# Patient Record
Sex: FEMALE | Race: WHITE | NOT HISPANIC OR LATINO | Employment: OTHER | URBAN - METROPOLITAN AREA
[De-identification: names, ages, dates, MRNs, and addresses within clinical notes are randomized per-mention and may not be internally consistent; named-entity substitution may affect disease eponyms.]

---

## 2017-01-10 ENCOUNTER — TREATMENT (OUTPATIENT)
Dept: PHYSICAL THERAPY | Facility: CLINIC | Age: 77
End: 2017-01-10

## 2017-01-10 ENCOUNTER — TELEPHONE (OUTPATIENT)
Dept: SPORTS MEDICINE | Facility: CLINIC | Age: 77
End: 2017-01-10

## 2017-01-10 DIAGNOSIS — M25.511 RIGHT SHOULDER PAIN, UNSPECIFIED CHRONICITY: Primary | ICD-10-CM

## 2017-01-10 PROCEDURE — DRYNDL PR CUSTOM DRY NEEDLING SELF PAY: Performed by: PHYSICAL THERAPIST

## 2017-01-10 PROCEDURE — 97161 PT EVAL LOW COMPLEX 20 MIN: CPT | Performed by: PHYSICAL THERAPIST

## 2017-01-10 PROCEDURE — 97140 MANUAL THERAPY 1/> REGIONS: CPT | Performed by: PHYSICAL THERAPIST

## 2017-01-10 NOTE — PROGRESS NOTES
"Physical Therapy Initial Evaluation      Patient Name: Jael Navarro       Patient MRN: [id number]  : 1940  Physician:Keaton Dao MD  Date: 1/10/2017    Encounter Diagnoses   Name Primary?   • Right shoulder pain, unspecified chronicity Yes       History: Pt describes 2-3 history of neck pain with \"trigger points\".  Acute exacerbation of right shoulder (upper trap) pain of 1-2 months duration, feels like it began when lifting her mother up in bed.   Also with generalized GH and upper arm pain.  Pt states she has \"tender points\" all over her body, including her quads and elbows.  Pain is worse with OH reach.  Pain ranges from 3/10 to 9/10.    Objective Testing: See flow sheet    THERAPY ASSESSMENT:  Jael Navarro is a pleasant 76 y.o. year old female who presents with signs and symptoms consistent with shoulder strain, most notably in upper trap and infraspinatus, likely exacerbated by postural dysfunction.  Pt also with some indication of rotator cuff involvement.  Subjective outcome measure indicates 45% perceived disability.  Patient is a  good candidate for skilled PT services in order to address impairments and facilitate return to normal daily activities including ADL's, work and recreational activities.        Functional Limitations: Lifting, Carrying, Complaints of Pain, Difficulty moving, Postural Dysfunction, Decreased ROM, Decreased Strength, Decreased ability to perform ADL's   Length of Therapy: 1 month   PT Frequency: PT 2x week   PT Interventions: Therapeutic exercise - AROM, Therapeutic exercise - stretching, Therapeutic exercise - strengthening, Traction, Manual Therapy, Bracing/Taping, Soft Tissue mobilization, Hot packs/ Moist heat, Cold packs, Electrical stimulation, Other (Comment), Ultrasound, Home Exercise Program (Dry needling)   Patient Agrees with Plan of Care: Yes    REHAB POTENTIAL: good            Short Term Goals: 2-4 weeks  Patient will:  1. Be independent " with initial HEP  2. Be instructed in posture and body mechanics  3. Report pain </= 3 with all daily activities    Long Term Goals: 4-6 weeks  Patient will:  1. Report pain of </= 1/10 for improved tolerance to daily activities.  2. Demonstrate improved Bilateral UE/scapular MMT of >/= 4+/5  3. Demonstrate normal scapulohumeral rhythm without evidence of scapular winging/tipping  4. Demonstrate no soft tissue restriction in involved musculature  5. QuickDASH </= 20%, corresponding with  CJ      Manual PT [id number] 10 minutes    Timed Code Treatment: 10   Minutes     Total Treatment Time: 60      Minutes    PT SIGNATURE: Jazzy Holcomb, PT   DATE TREATMENT INITIATED: 1/10/2017    Initial Certification Certification Period: 2/9/2017  I certify that the therapy services are furnished while this patient is under my care.  The services outlined above are required by this patient, and will be reviewed every 30 days.     PHYSICIAN: Keaton Dao MD      DATE:     Please sign and return via fax to [phone].. Thank you, [facility].

## 2017-01-11 DIAGNOSIS — Z00.00 PREVENTATIVE HEALTH CARE: Primary | ICD-10-CM

## 2017-01-18 ENCOUNTER — TREATMENT (OUTPATIENT)
Dept: PHYSICAL THERAPY | Facility: CLINIC | Age: 77
End: 2017-01-18

## 2017-01-18 DIAGNOSIS — M25.511 RIGHT SHOULDER PAIN, UNSPECIFIED CHRONICITY: Primary | ICD-10-CM

## 2017-01-18 PROCEDURE — 97140 MANUAL THERAPY 1/> REGIONS: CPT | Performed by: PHYSICAL THERAPIST

## 2017-01-18 PROCEDURE — 97110 THERAPEUTIC EXERCISES: CPT | Performed by: PHYSICAL THERAPIST

## 2017-01-18 PROCEDURE — DRYNDL PR CUSTOM DRY NEEDLING SELF PAY: Performed by: PHYSICAL THERAPIST

## 2017-01-18 NOTE — PROGRESS NOTES
Daily Progress Note      Subjective   Pt reports significant decreased pain after last visit.       Objective   See the Objective Evaluation flowsheet for any objective testing that may have been performed today.    PROCEDURES AND MODALITIES:  Paraffin:    Moist Heat:    Ice:    E-Stim:    Ultrasound:    Ionto:   Traction:      Other Procedure CPT TPDN minutes 20 minutes     Timed Code Treatment: - Minutes  Total Treatment Time: 20 Minutes    Assessment/Plan   After reviewing all risk of dry needling (including pneumothorax, bruising, infection, nerve injury, and soreness), verbal and written informed consent was obtained.  Manual palpation and assessment performed before, during and after dry needling session.  Clean needle technique observed at all times, precautions for lung fields, neurovascular structures observed. Pt tolerated treatment well with no adverse effects.   Progress per Plan of Care-assess pt response     Chanda Chapman, PT  Physical Therapist

## 2017-01-18 NOTE — PROGRESS NOTES
Daily Progress Note      Subjective   Pt reports decreased pain after last visit, was able to sleep well last night.  Would like to do dry needling again today.  Pain Scale (0-10): 3/10      Objective    Please see documentation completed by Chanda Chapman, PT, DPT for dry needling treatment performed today      PROCEDURES AND MODALITIES:  Paraffin:    Moist Heat: Rx Minutes: 10 mins  Ice:    E-Stim:    Ultrasound:    Ionto:   Traction:        Manual PT [id number] 15 minutes and Therapy Exercise 11074 8 minutes     Timed Code Treatment: 23 Minutes  Total Treatment Time: 50 Minutes    Assessment/Plan   Tolerated well. Requires significant cueing to ensure good for with exercises.  Progress per Plan of Care         Jazzy Holcomb, PT  Physical Therapist

## 2017-01-18 NOTE — PATIENT INSTRUCTIONS
Access Code: [email or web address]   URL: [email or web address]   Date: 01/18/2017   Prepared by: Jazzy Frausto   Seated Shoulder Rolls - 10 reps - 2 sets - 1x daily   Seated Scapular Retraction - 10 reps - 2 sets - 5 Hold - 1x daily

## 2017-01-19 ENCOUNTER — TELEPHONE (OUTPATIENT)
Dept: GASTROENTEROLOGY | Facility: CLINIC | Age: 77
End: 2017-01-19

## 2017-01-19 NOTE — TELEPHONE ENCOUNTER
Spoke with pt and informed her that after reviewing her OA paperwork, Dr Hidalgo requests to see her in the office prior to scheduling any scopes.  New patient appt scheduled for 2/9/17 at 1:15 pm with Dr Hidalgo.  Pt is going to call her PCP, Dr Dao to get a referral from [facility] for the office visit.

## 2017-01-20 ENCOUNTER — TREATMENT (OUTPATIENT)
Dept: PHYSICAL THERAPY | Facility: CLINIC | Age: 77
End: 2017-01-20

## 2017-01-20 DIAGNOSIS — M25.511 RIGHT SHOULDER PAIN, UNSPECIFIED CHRONICITY: Primary | ICD-10-CM

## 2017-01-20 PROCEDURE — 97035 APP MDLTY 1+ULTRASOUND EA 15: CPT | Performed by: PHYSICAL THERAPIST

## 2017-01-20 PROCEDURE — 97140 MANUAL THERAPY 1/> REGIONS: CPT | Performed by: PHYSICAL THERAPIST

## 2017-01-20 NOTE — PROGRESS NOTES
Daily Progress Note      Subjective   Pt states her upper trap is really hurting, doesn't feel like she can do the dry needling.  Does feel that her mobility is improving, but pain is unchanged.    Pain Scale (0-10): 5/10      Objective          PROCEDURES AND MODALITIES:  Paraffin:    Moist Heat: Rx Minutes: 12 mins  Ice:    E-Stim:    Ultrasound: Rx Minutes: 6/8  Ionto:   Traction:        Manual PT [id number] 15 minutes     Timed Code Treatment: 23 Minutes  Total Treatment Time: 38 Minutes    Assessment/Plan   Improved pain after Rx.    Progress per Plan of Care, increase postural work, exercise in clinic         Jazzy Holcomb, PT  Physical Therapist

## 2017-01-23 ENCOUNTER — TREATMENT (OUTPATIENT)
Dept: PHYSICAL THERAPY | Facility: CLINIC | Age: 77
End: 2017-01-23

## 2017-01-23 DIAGNOSIS — M25.511 RIGHT SHOULDER PAIN, UNSPECIFIED CHRONICITY: Primary | ICD-10-CM

## 2017-01-23 PROCEDURE — DRYNDL PR CUSTOM DRY NEEDLING SELF PAY: Performed by: PHYSICAL THERAPIST

## 2017-01-23 PROCEDURE — 97110 THERAPEUTIC EXERCISES: CPT | Performed by: PHYSICAL THERAPIST

## 2017-01-23 NOTE — PROGRESS NOTES
Physical Therapy Daily Progress Note      Jael Elana Melanie reports: decreased soreness in the right UT.  Reports improved mobility with TPDN.     Subjective    Objective     Palpation     Right Tenderness of the biceps, infraspinatus, teres major and upper trapezius.     See Exercise, Manual, and Modality Logs for complete treatment.     Assessment/Plan  After reviewing all risk of dry needling (including pneumothorax, bruising, infection, nerve injury, and soreness), written informed consent was obtained.  Manual palpation and assessment performed before, during and after dry needling session.  Clean needle technique observed at all times, precautions for lung fields, neurovascular structures observed. Pt tolerated treatment well with no adverse effects.     Progress per Plan of Care      Manual Therapy:  _     mins  43212;  Therapeutic Exercise:        mins  70823;     Neuromuscular Homa:      mins  49178;    Therapeutic Activity:          mins  99684;     Gait Training:           mins  65532;     Ultrasound:          mins  08039;    Electrical Stimulation:         mins  91177 ( );  Dry Needling      15    mins self-pay    Timed Treatment:   -   mins   Total Treatment:     15   mins    Chanda Chapman, PT  Physical Therapist

## 2017-01-23 NOTE — PROGRESS NOTES
Physical Therapy Daily Progress Note    Subjective     Jael Navarro reports: pain is better than last session, would like to try dry needling again.      Objective   See Exercise, Manual, and Modality Logs for complete treatment.   Please see documentation completed by Chanda Chapman, PT, DPT for dry needling treatment performed today    Assessment/Plan  Did well with exercises in clinic, pain/ease of movement improved.  Progress per Plan of Care with focus on postural exercise           Manual Therapy:    0     mins  71180;  Therapeutic Exercise:    30     mins  78976;     Neuromuscular Homa:    0    mins  11978;    Therapeutic Activity:     0     mins  75533;     Gait Trainin     mins  59228;     Ultrasound:     0     mins  39642;    Electrical Stimulation:    0     mins  59690 ( );    Timed Treatment:   30   mins   Total Treatment:     45   mins    Jazzy Holcomb PT  Physical Therapist  [place] License #[id number]

## 2017-01-26 ENCOUNTER — TREATMENT (OUTPATIENT)
Dept: PHYSICAL THERAPY | Facility: CLINIC | Age: 77
End: 2017-01-26

## 2017-01-26 DIAGNOSIS — M25.511 RIGHT SHOULDER PAIN, UNSPECIFIED CHRONICITY: Primary | ICD-10-CM

## 2017-01-26 PROCEDURE — 97110 THERAPEUTIC EXERCISES: CPT | Performed by: PHYSICAL THERAPIST

## 2017-01-26 PROCEDURE — 97140 MANUAL THERAPY 1/> REGIONS: CPT | Performed by: PHYSICAL THERAPIST

## 2017-01-26 NOTE — PROGRESS NOTES
Physical Therapy Daily Progress Note      Subjective     Jael Navarro reports: that her neck continues to be very sore but believes that her interscapular area is feeling better.  She reports that her SCM has been very sore.       Objective   See Exercise, Manual, and Modality Logs for complete treatment.       Assessment/Plan  Mrs. Navarro continues to have a lot of pain in her neck with severe muscle tightness.  Upon palpation her SCM bilaterally was very tight and instructed her on how to perform a SCM stretch.  She continues to need skilled PT to reduce her muscle tightness and improved ROM.     Progress per Plan of Care           Manual PT [id number] 15 minutes and Therapy Exercise 70968 25 minutes    Timed Treatment:   40   mins   Total Treatment:    50   mins    Anali Manzanares, PT  Physical Therapist  [place] License # [id number]

## 2017-01-27 ENCOUNTER — TELEPHONE (OUTPATIENT)
Dept: SPORTS MEDICINE | Facility: CLINIC | Age: 77
End: 2017-01-27

## 2017-01-27 DIAGNOSIS — Z00.00 PREVENTATIVE HEALTH CARE: ICD-10-CM

## 2017-02-03 ENCOUNTER — TREATMENT (OUTPATIENT)
Dept: PHYSICAL THERAPY | Facility: CLINIC | Age: 77
End: 2017-02-03

## 2017-02-03 DIAGNOSIS — M25.511 RIGHT SHOULDER PAIN, UNSPECIFIED CHRONICITY: Primary | ICD-10-CM

## 2017-02-03 PROCEDURE — 97110 THERAPEUTIC EXERCISES: CPT | Performed by: PHYSICAL THERAPIST

## 2017-02-03 PROCEDURE — DRYNDL PR CUSTOM DRY NEEDLING SELF PAY: Performed by: PHYSICAL THERAPIST

## 2017-02-03 NOTE — PROGRESS NOTES
Physical Therapy Daily Progress Note      Jael Elanamauricio Navarro reports: significant decreased pain since first visit.      Objective  See Exercise, Manual, and Modality Logs for complete treatment.     Assessment/Plan  After reviewing all risk of dry needling (including pneumothorax, bruising, infection, nerve injury, and soreness), written informed consent was obtained.  Manual palpation and assessment performed before, during and after dry needling session.  Clean needle technique observed at all times, precautions for lung fields, neurovascular structures observed. Pt tolerated treatment well with no adverse effects.     Progress per Plan of Care    Other Procedure CPT TPDN minutes 20    Timed Code Treatment: -   Minutes     Total Treatment Time: 20      Minutes    Chanda Chapman, PT, DPT  Physical Therapist #[id number]

## 2017-02-10 ENCOUNTER — TREATMENT (OUTPATIENT)
Dept: PHYSICAL THERAPY | Facility: CLINIC | Age: 77
End: 2017-02-10

## 2017-02-10 DIAGNOSIS — M25.511 RIGHT SHOULDER PAIN, UNSPECIFIED CHRONICITY: Primary | ICD-10-CM

## 2017-02-10 PROCEDURE — 97140 MANUAL THERAPY 1/> REGIONS: CPT | Performed by: PHYSICAL THERAPIST

## 2017-02-10 PROCEDURE — 97035 APP MDLTY 1+ULTRASOUND EA 15: CPT | Performed by: PHYSICAL THERAPIST

## 2017-02-10 NOTE — PROGRESS NOTES
Physical Therapy Daily Progress Note    Subjective     Jael Navarro reports: she cut her finger and has 10 stitches.  Her neck has been sore because of stress.      Objective   See Exercise, Manual, and Modality Logs for complete treatment.       Assessment/Plan  Held exercises today because of finger laceration.    Progress per Plan of Care resume exercises as able           Manual Therapy:    20     mins  73622;  Therapeutic Exercise:    0     mins  24239;     Neuromuscular Homa:    0    mins  81895;    Therapeutic Activity:     0     mins  13552;     Gait Trainin     mins  11685;     Ultrasound:     8     mins  63133;    Electrical Stimulation:    0     mins  86902 ( );    Timed Treatment:   28   mins   Total Treatment:     28   mins    Jazzy Holcomb PT, DPT  Physical Therapist  [place] License #[id number]

## 2017-02-17 ENCOUNTER — OFFICE VISIT (OUTPATIENT)
Dept: SPORTS MEDICINE | Facility: CLINIC | Age: 77
End: 2017-02-17

## 2017-02-17 ENCOUNTER — TREATMENT (OUTPATIENT)
Dept: PHYSICAL THERAPY | Facility: CLINIC | Age: 77
End: 2017-02-17

## 2017-02-17 VITALS
BODY MASS INDEX: 21.49 KG/M2 | HEART RATE: 70 BPM | DIASTOLIC BLOOD PRESSURE: 78 MMHG | HEIGHT: 65 IN | OXYGEN SATURATION: 98 % | SYSTOLIC BLOOD PRESSURE: 134 MMHG | WEIGHT: 129 LBS

## 2017-02-17 DIAGNOSIS — I10 ESSENTIAL HYPERTENSION: Primary | ICD-10-CM

## 2017-02-17 DIAGNOSIS — Z48.02 VISIT FOR SUTURE REMOVAL: ICD-10-CM

## 2017-02-17 DIAGNOSIS — M25.511 RIGHT SHOULDER PAIN, UNSPECIFIED CHRONICITY: Primary | ICD-10-CM

## 2017-02-17 PROCEDURE — 97035 APP MDLTY 1+ULTRASOUND EA 15: CPT | Performed by: PHYSICAL THERAPIST

## 2017-02-17 PROCEDURE — 97140 MANUAL THERAPY 1/> REGIONS: CPT | Performed by: PHYSICAL THERAPIST

## 2017-02-17 PROCEDURE — 99213 OFFICE O/P EST LOW 20 MIN: CPT | Performed by: FAMILY MEDICINE

## 2017-02-17 NOTE — PROGRESS NOTES
"Jael is a 76 y.o. year old female    Chief Complaint   Patient presents with   • Right Hand - Finger Injury, Pain   • Hypertension     Pt is here to f/u       History of Present Illness  1. Recently having elevated BP readings at home with automated cuff, up to 170s systolic. No CP or HA but has been under a lot of stress with her mother's declining health and likely upcoming death.   2. Cut her hand in the kitchen 2/6, had sutures placed, needs removed.     I have reviewed the patient's medical history in detail and updated the computerized patient record.    Review of Systems   Constitutional: Negative.    Respiratory: Negative.    Cardiovascular: Negative.    Neurological: Negative.        Visit Vitals   • /78   • Pulse 70   • Ht 65\" (165.1 cm)   • Wt 129 lb (58.5 kg)   • SpO2 98%   • BMI 21.47 kg/m2        Physical Exam   Constitutional: She is oriented to person, place, and time. She appears well-developed and well-nourished.   Neck: Normal range of motion.   Cardiovascular: Normal rate, regular rhythm and normal heart sounds.    Pulmonary/Chest: Effort normal and breath sounds normal.   Musculoskeletal:   R 5th finger with healed laceration overlying the MCP. Normal ROM and tendon function.    Neurological: She is alert and oriented to person, place, and time.   Skin: Skin is warm and dry.   Psychiatric: She has a normal mood and affect.   Vitals reviewed.    Sutures removed without difficulty. There was very mild tension at the periphery of the wound so a simple steri strip was applied with a bandage.     Diagnoses and all orders for this visit:    Essential hypertension    Visit for suture removal  Repeat manual BP checks by MA and MD are both relatively normal, 130s/70s. Check with home cuff read 160s/90s. Suspect inaccurate cuff; BP stable.  Wound healing well.  "

## 2017-02-17 NOTE — PROGRESS NOTES
Physical Therapy Daily Progress Note    Subjective     Jael Navarro reports: hasn't gotten her stitches out yet because her finger isn't fully healed.      Objective   See Exercise, Manual, and Modality Logs for complete treatment.       Assessment/Plan  Held exercise again today because of finger.  Will reassess next visit and resume exercises as able.  Progress per Plan of Care           Manual Therapy:    20     mins  84379;  Therapeutic Exercise:    0     mins  38914;     Neuromuscular Homa:    0    mins  47217;    Therapeutic Activity:     0     mins  20256;     Gait Trainin     mins  57129;     Ultrasound:     8     mins  54921;    Electrical Stimulation:    0     mins  45324 ( );    Timed Treatment:   28   mins   Total Treatment:     43   mins    Jazzy Holcomb PT, DPT  Physical Therapist  [place] License #[id number]

## 2017-02-23 ENCOUNTER — TREATMENT (OUTPATIENT)
Dept: PHYSICAL THERAPY | Facility: CLINIC | Age: 77
End: 2017-02-23

## 2017-02-23 ENCOUNTER — TRANSCRIBE ORDERS (OUTPATIENT)
Dept: ADMINISTRATIVE | Facility: HOSPITAL | Age: 77
End: 2017-02-23

## 2017-02-23 DIAGNOSIS — Z13.9 SCREENING: Primary | ICD-10-CM

## 2017-02-23 DIAGNOSIS — M25.511 RIGHT SHOULDER PAIN, UNSPECIFIED CHRONICITY: Primary | ICD-10-CM

## 2017-02-23 PROCEDURE — 97110 THERAPEUTIC EXERCISES: CPT | Performed by: PHYSICAL THERAPIST

## 2017-02-23 NOTE — PROGRESS NOTES
Physical Therapy Daily Progress Note    Subjective     Jael Navarro reports: shoulder is feeling pretty good, pain is reduced with exercises.      Objective   See Exercise, Manual, and Modality Logs for complete treatment.   Please see documentation completed by Chanda Chapman, PT, DPT for dry needling treatment performed today        Assessment/Plan  Pt is independent with HEP, minimal pain, QuickDASH improved by 10%.  Recommend hold at this time, if no acute exacerbation in 30 days, then D/C       Manual Therapy:    0     mins  58938;  Therapeutic Exercise:    40     mins  51243;     Neuromuscular Homa:    0    mins  93484;    Therapeutic Activity:     0     mins  17301;     Gait Trainin     mins  50452;     Ultrasound:     0     mins  69937;    Electrical Stimulation:    0     mins  56328 ( );    Timed Treatment:   40   mins   Total Treatment:     50   mins    Jazzy Holcomb PT, DPT  Physical Therapist  [place] License #[id number]

## 2017-02-24 NOTE — PATIENT INSTRUCTIONS
Access Code: [email or web address]   URL: [email or web address]   Date: 02/23/2017   Prepared by: Jazzy Holcomb     Exercises   Standing Shoulder Row with Anchored Resistance - 10 reps - 2 sets - 3x weekly   Shoulder Extension with Resistance - Palms Forward - 10 reps - 2 sets - 3x weekly   Sternocleidomastoid Stretch - 3 reps - 20 hold - 1x daily   Seated Upper Trapezius Stretch - 3 reps - 20 hold - 1x daily   Gentle Levator Scapulae Stretch - 3 reps - 20 hold - 1x daily   Supine Shoulder Alphabet - 26 reps - 2 sets - 3x weekly   Supine Bilateral Shoulder Protraction - 10 reps - 1 sets - 1x daily

## 2017-03-02 ENCOUNTER — OFFICE VISIT (OUTPATIENT)
Dept: GASTROENTEROLOGY | Facility: CLINIC | Age: 77
End: 2017-03-02

## 2017-03-02 ENCOUNTER — HOSPITAL ENCOUNTER (OUTPATIENT)
Dept: CARDIOLOGY | Facility: HOSPITAL | Age: 77
Discharge: HOME | End: 2017-03-02

## 2017-03-02 VITALS
BODY MASS INDEX: 21.83 KG/M2 | DIASTOLIC BLOOD PRESSURE: 75 MMHG | SYSTOLIC BLOOD PRESSURE: 173 MMHG | WEIGHT: 131 LBS | HEIGHT: 65 IN | HEART RATE: 70 BPM

## 2017-03-02 VITALS
DIASTOLIC BLOOD PRESSURE: 86 MMHG | BODY MASS INDEX: 22.36 KG/M2 | WEIGHT: 131 LBS | SYSTOLIC BLOOD PRESSURE: 152 MMHG | HEIGHT: 64 IN

## 2017-03-02 DIAGNOSIS — Z13.9 SCREENING: ICD-10-CM

## 2017-03-02 DIAGNOSIS — R10.32 LEFT LOWER QUADRANT PAIN: Primary | ICD-10-CM

## 2017-03-02 LAB
BH CV VAS BP LEFT ARM: NORMAL MMHG
BH CV VAS BP RIGHT ARM: NORMAL MMHG

## 2017-03-02 PROCEDURE — 99204 OFFICE O/P NEW MOD 45 MIN: CPT | Performed by: INTERNAL MEDICINE

## 2017-03-29 ENCOUNTER — ANESTHESIA (OUTPATIENT)
Dept: GASTROENTEROLOGY | Facility: HOSPITAL | Age: 77
End: 2017-03-29

## 2017-03-29 ENCOUNTER — ANESTHESIA EVENT (OUTPATIENT)
Dept: GASTROENTEROLOGY | Facility: HOSPITAL | Age: 77
End: 2017-03-29

## 2017-03-29 ENCOUNTER — HOSPITAL ENCOUNTER (OUTPATIENT)
Facility: HOSPITAL | Age: 77
Setting detail: HOSPITAL OUTPATIENT SURGERY
Discharge: HOME | End: 2017-03-29
Attending: INTERNAL MEDICINE | Admitting: INTERNAL MEDICINE

## 2017-03-29 VITALS
RESPIRATION RATE: 16 BRPM | HEART RATE: 66 BPM | WEIGHT: 132 LBS | TEMPERATURE: 97.4 F | SYSTOLIC BLOOD PRESSURE: 167 MMHG | DIASTOLIC BLOOD PRESSURE: 73 MMHG | HEIGHT: 65 IN | OXYGEN SATURATION: 98 % | BODY MASS INDEX: 21.99 KG/M2

## 2017-03-29 DIAGNOSIS — R10.32 LEFT LOWER QUADRANT PAIN: ICD-10-CM

## 2017-03-29 PROCEDURE — 25010000002 FENTANYL CITRATE (PF) 100 MCG/2ML SOLUTION: Performed by: ANESTHESIOLOGY

## 2017-03-29 PROCEDURE — 45380 COLONOSCOPY AND BIOPSY: CPT | Performed by: INTERNAL MEDICINE

## 2017-03-29 PROCEDURE — 88305 TISSUE EXAM BY PATHOLOGIST: CPT | Performed by: INTERNAL MEDICINE

## 2017-03-29 PROCEDURE — 25010000002 PROPOFOL 10 MG/ML EMULSION: Performed by: ANESTHESIOLOGY

## 2017-03-29 RX ORDER — FENTANYL CITRATE 50 UG/ML
INJECTION, SOLUTION INTRAMUSCULAR; INTRAVENOUS AS NEEDED
Status: DISCONTINUED | OUTPATIENT
Start: 2017-03-29 | End: 2017-03-29 | Stop reason: SURG

## 2017-03-29 RX ORDER — PROPOFOL 10 MG/ML
VIAL (ML) INTRAVENOUS AS NEEDED
Status: DISCONTINUED | OUTPATIENT
Start: 2017-03-29 | End: 2017-03-29 | Stop reason: SURG

## 2017-03-29 RX ORDER — SODIUM CHLORIDE, SODIUM LACTATE, POTASSIUM CHLORIDE, CALCIUM CHLORIDE 600; 310; 30; 20 MG/100ML; MG/100ML; MG/100ML; MG/100ML
30 INJECTION, SOLUTION INTRAVENOUS CONTINUOUS PRN
Status: DISCONTINUED | OUTPATIENT
Start: 2017-03-29 | End: 2017-03-29 | Stop reason: HOSPADM

## 2017-03-29 RX ADMIN — SODIUM CHLORIDE, POTASSIUM CHLORIDE, SODIUM LACTATE AND CALCIUM CHLORIDE 30 ML/HR: 600; 310; 30; 20 INJECTION, SOLUTION INTRAVENOUS at 07:57

## 2017-03-29 RX ADMIN — PROPOFOL 100 MG: 10 INJECTION, EMULSION INTRAVENOUS at 08:20

## 2017-03-29 RX ADMIN — PROPOFOL 100 MG: 10 INJECTION, EMULSION INTRAVENOUS at 08:10

## 2017-03-29 RX ADMIN — PROPOFOL 100 MG: 10 INJECTION, EMULSION INTRAVENOUS at 08:15

## 2017-03-29 RX ADMIN — SODIUM CHLORIDE, POTASSIUM CHLORIDE, SODIUM LACTATE AND CALCIUM CHLORIDE: 600; 310; 30; 20 INJECTION, SOLUTION INTRAVENOUS at 08:10

## 2017-03-29 RX ADMIN — FENTANYL CITRATE 100 MCG: 50 INJECTION INTRAMUSCULAR; INTRAVENOUS at 08:10

## 2017-03-29 NOTE — BRIEF OP NOTE
COLONOSCOPY  Procedure Note    Jael Navarro  3/29/2017    Pre-op Diagnosis:   Left lower quadrant pain [R10.32]    Post-op Diagnosis:     Post-Op Diagnosis Codes:     * Left lower quadrant pain [R10.32]     * Diverticulosis [K57.90]    Procedure/CPT® Codes:      Procedure(s):  COLONOSCOPY TO CECUM WITH COLD BIOPSIES    Surgeon(s):  Kyle Hidalgo MD    Anesthesia: Monitor Anesthesia Care    Staff:   Endo Technician: Jt Tapia  Endo Nurse: Pat Adhikari RN    Estimated Blood Loss: * No values recorded between 3/29/2017  8:02 AM and 3/29/2017  8:32 AM *  Urine Voided: * No values recorded between 3/29/2017  8:02 AM and 3/29/2017  8:32 AM *    Specimens:                  ID Type Source Tests Collected by Time Destination   A : RECTAL BIOPSIES Tissue Large Intestine, Rectum TISSUE EXAM Kyle Hidalgo MD 3/29/2017 0828          Drains:    na       Findings: Diverticulosis  Internal hemorrhoids    Complications: None      Kyle Hidalgo MD     Date: 3/29/2017  Time: 8:33 AM

## 2017-03-29 NOTE — H&P (VIEW-ONLY)
Chief Complaint   Patient presents with   • Diarrhea   • Abdominal Pain     Jael Navarro is a 76 y.o. female who presents with a 3 of recurrent left lower quadrant pain   HPI     Patient 76-year-old female with history of hypertension and irritable bowel presenting with recurrent left lower quadrant pain.  Patient denies nausea vomiting no melena or bright red blood per rectum.  Patient reports symptoms not particularly related to eating or drinking not related to bowel movements or lack of bowel movements.  Patient denies any change in her normal pattern of bowel movements as well.  Patient's last colonoscopy approximately 6-7 years ago.    Past Medical History   Diagnosis Date   • Allergic rhinitis    • Degeneration, intervertebral disc, thoracic    • Fracture of ankle    • Gingival and periodontal disease    • Hypertension    • Irritable bowel syndrome    • Knee fracture    • Wrist fracture        Current Outpatient Prescriptions:   •  aspirin 81 MG EC tablet, Take 1 tablet by mouth nightly., Disp: , Rfl:   •  levothyroxine (SYNTHROID, LEVOTHROID) 100 MCG tablet, TAKE ONE TABLET BY MOUTH ONCE DAILY, Disp: 30 tablet, Rfl: 2  •  fexofenadine (ALLEGRA) 180 MG tablet, Take by mouth., Disp: , Rfl:   •  Influenza Vac Typ A&B Surf Ant (FLUVIRIN) suspension, Apply  to cheek., Disp: , Rfl:   •  Influenza Vac Typ A&B Surf Ant injection, Apply to cheek., Disp: , Rfl:   •  Multiple Vitamin (MULTI-VITAMIN) tablet, Take by mouth., Disp: , Rfl:   •  vitamin D (ERGOCALCIFEROL) 77771 UNITS capsule capsule, Take 1 capsule by mouth once a week., Disp: , Rfl:   Allergies   Allergen Reactions   • Iodinated Diagnostic Agents Anaphylaxis   • Iodine    • Sulfa Antibiotics Rash     Social History     Social History   • Marital status:      Spouse name: N/A   • Number of children: N/A   • Years of education: N/A     Occupational History   • Not on file.     Social History Main Topics   • Smoking status: Never Smoker   •  Smokeless tobacco: Not on file   • Alcohol use No   • Drug use: No   • Sexual activity: Not on file     Other Topics Concern   • Not on file     Social History Narrative     Family History   Problem Relation Age of Onset   • Heart attack Mother    • Alzheimer's disease Mother    • Glaucoma Mother    • Heart disease Mother    • Hypertension Mother    • Thyroid disease Mother    • Heart disease Father    • Alzheimer's disease Sister    • Anxiety disorder Daughter    • Bipolar disorder Daughter    • Depression Daughter    • Stroke Daughter    • Colon polyps Daughter    • Heart attack Maternal Grandmother    • Breast cancer Other    • Lung cancer Other    • Ovarian cancer Other    • Diabetes Other    • Heart disease Other    • Diabetes Cousin    • Emphysema Cousin    • Heart disease Cousin    • Multiple sclerosis Cousin    • Heart disease Other    • Nephrolithiasis Other      Review of Systems   Constitutional: Negative.    Eyes: Negative.    Respiratory: Negative.    Cardiovascular: Negative.    Gastrointestinal: Positive for abdominal pain.   Endocrine: Negative.    Musculoskeletal: Negative.    Skin: Negative.    Allergic/Immunologic: Negative.    Hematological: Negative.      Vitals:    03/02/17 1329   BP: 152/86     Physical Exam   Constitutional: She is oriented to person, place, and time. She appears well-developed and well-nourished.   HENT:   Head: Normocephalic and atraumatic.   Eyes: Pupils are equal, round, and reactive to light. No scleral icterus.   Neck: Normal range of motion. Neck supple.   Cardiovascular: Normal rate, regular rhythm and normal heart sounds.  Exam reveals no gallop and no friction rub.    No murmur heard.  Pulmonary/Chest: Effort normal and breath sounds normal. She has no wheezes. She has no rales.   Abdominal: Soft. Bowel sounds are normal. She exhibits no shifting dullness, no distension, no pulsatile liver, no fluid wave, no abdominal bruit, no ascites, no pulsatile midline mass and  no mass. There is no hepatosplenomegaly. There is no tenderness. There is no rigidity and no guarding. No hernia.   Musculoskeletal: Normal range of motion. She exhibits no edema.   Lymphadenopathy:     She has no cervical adenopathy.   Neurological: She is alert and oriented to person, place, and time. No cranial nerve deficit.   Skin: Skin is warm and dry.   Psychiatric: She has a normal mood and affect. Her behavior is normal. Thought content normal.   Nursing note and vitals reviewed.    Diagnoses and all orders for this visit:    Left lower quadrant pain  -     Case Request; Standing  -     Case Request    Other orders  -     Implement Anesthesia orders day of procedure.; Standing  -     Obtain informed consent; Standing     patient is a 76-year-old female with history of hypertension and IBS presenting with intermittent left lower quadrant pain.  Patient's last colonoscopy proximally 6-7 years ago now referred for further evaluation.  Patient reports no specific change in bowel habits tend to be regular.  Patient reports the pain comes and goes not necessarily related to bowel movements or lack of bowel movements not necessarily related to eating or not eating.  At this point this patient more than 6 years since last colonoscopy and left lower quadrant pain otherwise unexplained would proceed with colonoscopy if negative patient can return to every 10 year monitoring.  We'll follow-up clinically based on colonoscopy findings.

## 2017-03-29 NOTE — PLAN OF CARE
Problem: Patient Care Overview (Adult)  Goal: Plan of Care Review  Outcome: Ongoing (interventions implemented as appropriate)    03/29/17 0729   Coping/Psychosocial Response Interventions   Plan Of Care Reviewed With patient   Patient Care Overview   Progress no change       Goal: Adult Individualization and Mutuality  Outcome: Ongoing (interventions implemented as appropriate)  Goal: Discharge Needs Assessment  Outcome: Ongoing (interventions implemented as appropriate)    03/29/17 0729   Discharge Needs Assessment   Concerns To Be Addressed no discharge needs identified   Discharge Disposition home or self-care   Living Environment   Transportation Available car;family or friend will provide         Problem: GI Endoscopy (Adult)  Goal: Signs and Symptoms of Listed Potential Problems Will be Absent or Manageable (GI Endoscopy)  Outcome: Ongoing (interventions implemented as appropriate)    03/29/17 0729   GI Endoscopy   Problems Assessed (GI Endoscopy) all   Problems Present (GI Endoscopy) none

## 2017-03-29 NOTE — ANESTHESIA PREPROCEDURE EVALUATION
Anesthesia Evaluation     Patient summary reviewed and Nursing notes reviewed   NPO Status: > 6 hours   Airway   Mallampati: II  Neck ROM: full  no difficulty expected  Dental      Comment: Caps, other dental work    Pulmonary - negative pulmonary ROS    breath sounds clear to auscultation  Cardiovascular     Rhythm: regular    (+) hypertension,       Neuro/Psych- negative ROS  GI/Hepatic/Renal/Endo    (+)  GERD, hypothyroidism,     Musculoskeletal     (+) myalgias,   Abdominal    Substance History - negative use     OB/GYN negative ob/gyn ROS         Other   (+) arthritis                               Anesthesia Plan    ASA 2     MAC     intravenous induction   Anesthetic plan and risks discussed with patient.

## 2017-03-29 NOTE — ANESTHESIA POSTPROCEDURE EVALUATION
Patient: Jael Navarro    Procedure Summary     Date Anesthesia Start Anesthesia Stop Room / Location    03/29/17 0810 0833  [facility]       Procedure Diagnosis Surgeon Provider    COLONOSCOPY TO CECUM WITH COLD BIOPSIES (N/A ) Left lower quadrant pain; Diverticulosis  (Left lower quadrant pain [R10.32]) MD Eris Eason MD          Anesthesia Type: MAC  Last vitals  /68 (03/29/17 0836)    Temp 36.3 °C (97.4 °F) (03/29/17 0753)    Pulse 68 (03/29/17 0836)   Resp 16 (03/29/17 0836)    SpO2 100 % (03/29/17 0836)      Post Anesthesia Care and Evaluation    Patient location during evaluation: PACU  Patient participation: complete - patient participated  Level of consciousness: awake and alert  Pain management: adequate  Airway patency: patent  Anesthetic complications: No anesthetic complications    Cardiovascular status: acceptable  Respiratory status: acceptable  Hydration status: acceptable

## 2017-03-29 NOTE — DISCHARGE INSTRUCTIONS
WHAT ARE DIVERTICULOSIS AND DIVERTICULITIS?  Many people have small pouches in their colons that bulge outward through weak spots, like an inner tube that pokes through weak places in a tire.  Each pouch is called a diverticulum.  The condition of having diverticula is DIVERTICULOSIS.  The condition becomes more common as people age.  About half of all people over the age of 60 have diverticulosis    When pouches become infected or inflamed, the condition is called DIVERTICULITIS.  This happens in 10% to 25% of people with diverticulosis.  Diverticulosis and diverticulitis are also called DIVERTICULAR DISEASE.     WHAT ARE THE SYMPTOMS?  Diverticulosis - Most people do not have any discomfort or symptoms.  However, symptoms may include mild cramps, bloating, and constipation.  Other diseases such as irritable bowel syndrome (IBS) and stomach ulcers cause similar problems, so these symptoms do not always mean a person has diverticulosis.  You should visit your doctor if you have these troubling symptoms.    Diverticulitis - The most common symptom is abdominal pain.  The most common sign is tenderness around the left side of the lower abdomen.  If infection is the cause, fever, nausea, vomiting, chills, cramping, and constipation may occur as well.  The severity depends on the extent of the infection and complications.    WHAT ARE THE COMPLICATIONS?  Diverticulitis can lead to bleeding, infections,perforations or tears, or blockages.  These complications always require treatment to prevent them from proggressing and causing serous illness.    Bleeding from a diverticula is a rare complication.  When this occurs, blood may appear in the toilet or in your stool.  Bleeding can be severe, but it may stop by itself and not require treatment.  Doctors believe bleeding diverticula are caused by a small blood vessel in a diverticulum that weakens and finally bursts.  If you have bleeding from the rectum, you should see your  doctor.  If the bleeding does not stop you may need surgery.    Abscess, Perforation, and Peritonitis - The infection causing diverticulitis often clears up after a few days of treatment with antibiotics.  If the condition gets worse, an abscess may form in the colon.  An abscess is an infected area with pus that may cause swelling and destroy tissue.  Sometimes the infected diverticula may develop small holes, called perforations.  These perforations allow pus to leak out of the colon into the abdominal area.  If the abscess is small and remains in the colon, it may clear up after treatment with antibiotics.  If not, the doctor may need to drain it.  A large abscess can become a serious problem if the infection leaks out and contaminates areas outside the colon.  Infection that spreads into the abdominal cavity is called peritonitis.  Peritonitis requires immediate surgery toclean the abdominal cavity and remove the damaged part of the colon.  Without surgery, peritonitis can be fatal.    FISTULA  A fistula is an abnormal connection of tissue between two organs or between an organ and the skin.  When damaged tissues come into contact with each other during infection, they sometimes stick together.  If they heal that way, a fistula forms.  When diverticulitis-related infection spreads through out the colon, the colon's tissue may stick to nearby tissues.  The organs usually involved are the bladder, small intestine, and skin.  The problem can be corrected with surgery to remove the fistula and affected part of the colon.    INTESTINAL OBSTRUCTION  The scarring caused by infection may cause partial or total blockage of the large intestine.  When this happens, the colon is unable to move bowel contents normally.  When the obstruction totally blocks the intestine, emergency surgery is necessary.  Partial blockage is not an emergency, so the surgery to correct it can be planned.    WHAT CAUSES DIVERTICULAR  DISEASE  Although not proven, the dominant theory is that a low-fiber diet is the main cause of diverticular disease.  The disease was first noticed in the [place] in the early 1900s.  At about the same time, processed foods were introduced into the American diet.  Many processed foods contain refined, low-fiber flour.  Unlike whole-wheat flour, refined flour has no wheat bran.    Diverticular disease is common in developed or industrialized countries-particularly the [place], [place], and [place]-where low-fiber diets are common.  The disease is rare in countries of [place] and [place], where people eat high-fiber vegetable diets.    Fiber is the part of fruits, vegetables, and whole grains that the body cannot digest.  Some fiber dissolves easily in water (soluble fiber).  It takes on a soft, jelly-like texture in the intestines.  Some fiber passes almost unchanged through the intestines (insoluble fiber).  Both kinds of fiber help make stools soft and easy to pass.  Fiber also prevents constipation.    Constipation makes the muscles strain to move stool that is too hard.  It is the main cause of increased pressure in the colon.  This excess pressure might cause the weak spots in the colon to bulge out and become diverticula.  Diverticulitis occurs when diverticula become infected or inflamed.  Doctors are not certain what causes the infection.  It may begin when stool or bacteria are caught in the diverticula.  An attack of diverticulitis can develop suddenly and without warning.    HOW DOES THE DOCTOR DIAGNOSE DIVERTICULAR DISEASE  The doctor asks about medical history, does a physical exam, and may perform one or more diagnostic tests.  Because most people do not have symptoms, diverticulosis is often found through tests ordered for another ailment.    When taking a medical history, the doctor may ask about bowel habits, symptoms, pain, diet, and medications.  The physical exam usually involves a  digital rectal exam.  To preform this test. The doctor inserts a gloved, lubricated finger into the rectum to detect tenderness, blockage, or blood.  The doctor may check stool for signs of bleeding and test blood for signs of infection.  The doctor may also order x-rays or other tests.    WHAT IS THE TREATMENT FOR DIVERTICULAR DISEASE  Increasing the amount of fiber in the diet may reduce symptoms of diverticulosis and prevent complications such as diverticulitis.  Fiber keeps stool soft and lowers pressure inside the colon so that bowel contents can move through easily.  The American Dietetic Association. Recommends 20 to 35 grams of fiber each day.  The doctor may also recommend taking a fiber product such as Citrucel or Metamucil once a dya.  These products are mixed water and provide about 2 to 3.5 grams of fiber per  Tablespoon, mixed with 8 ounces of water.    Avoidance of nuts, popcorn, and sunflower, pumpkin, rowdy, and sesame seeds has been recommended by physicians out of fear that food particles could enter, block, or irritate the diverticula.  However, no scientific data support this treatment measure.  Eating a high-fiber diet is the only requirement highly emphasized across the medical literature.  Eliminating specific foods is not necessary.  The seeds in tomatoes, zucchini, cucumbers, strawberries, and raspberries, as well as poppy seeds, are generally considered harmless.  People differ in amounts and types of foods the can eat.  Decisions about diet should be made based on what works best for each person.  Keeping a food diary may help identify what foods may cause symptoms.    If cramps, bloating, and constipation are problems, the doctor may prescribe  Short course of pain medication.  However, many medications affect emptying of the colon, an undesirable side effect for people with diverticulosis.    DIVERTICULITIS  Treatment focuses on clearing up the infection and inflammation, resting the  colon, and preventing or minimizing complications.  An attack of diverticulitis without complications may respond to antibiotics within a few days if treated early.  To help the colon rest, the doctor may recommend bed rest and a liquid diet, along with a pain reliever.    An acute attack with severe pain or sever infection may require a hospital stay.  Most acute cases of diverticulitis are treated with antibiotics and a liquid diet.  The antibiotics are given by injection into a vein.  In some cases, however, surgery may be necessary.    WHEN IS SURGERY NECESSARY  If attacks are severe or frequent, the doctor may advise surgery.  The surgeon removes the affected part of the colon and joins the remaining sections.  This typed of surgery, called colon resection, aims to keep attacks from coming back and to prevent complications.  The doctor may also recommend surgery for complications of a fistula or intestinal obstruction.    If antibiotics do not correct an attack, emergency surgery may be required.  Other reasons for emergency surgery include a large abscess, perforation, peritonitis, or continued bleeding.    Emergency surgery usually involves 2 operations.  The first will clear the infected abdominal cavity and remove part of the colon.  Because infection and sometimes obstruction, it is not safe to rejoin the colon during the first operation.  Instead, the surgeon creates a temporary hole, or stoma, in the abdomen.  The end of the colon is connected to the hole, a procedure called a colostomy, to allow normal eating and bowel movements.  The stool goes into a bag attached to the opening in the abdomen.  In the second operation, the surgeon rejoins the ends of the colon.

## 2017-03-30 LAB
CYTO UR: NORMAL
LAB AP CASE REPORT: NORMAL
Lab: NORMAL
PATH REPORT.FINAL DX SPEC: NORMAL
PATH REPORT.GROSS SPEC: NORMAL

## 2017-04-11 ENCOUNTER — OFFICE VISIT (OUTPATIENT)
Dept: SPORTS MEDICINE | Facility: CLINIC | Age: 77
End: 2017-04-11

## 2017-04-11 VITALS
WEIGHT: 135 LBS | HEIGHT: 65 IN | OXYGEN SATURATION: 97 % | SYSTOLIC BLOOD PRESSURE: 128 MMHG | DIASTOLIC BLOOD PRESSURE: 84 MMHG | BODY MASS INDEX: 22.49 KG/M2 | RESPIRATION RATE: 16 BRPM | HEART RATE: 77 BPM

## 2017-04-11 DIAGNOSIS — H26.9 CATARACTS, BILATERAL: ICD-10-CM

## 2017-04-11 DIAGNOSIS — H10.13 ALLERGIC CONJUNCTIVITIS, BILATERAL: Primary | ICD-10-CM

## 2017-04-11 DIAGNOSIS — J30.1 SEASONAL ALLERGIC RHINITIS DUE TO POLLEN: ICD-10-CM

## 2017-04-11 PROCEDURE — 99213 OFFICE O/P EST LOW 20 MIN: CPT | Performed by: FAMILY MEDICINE

## 2017-04-11 RX ORDER — METHYLPREDNISOLONE 4 MG/1
TABLET ORAL
Qty: 21 TABLET | Refills: 0 | Status: SHIPPED | OUTPATIENT
Start: 2017-04-11 | End: 2017-08-17

## 2017-04-11 NOTE — PROGRESS NOTES
"Jael is a 76 y.o. year old female    Chief Complaint   Patient presents with   • Facial Swelling     Rt eye swelling, burning, and itching        History of Present Illness   HPI Comments: Red eyes: ×1 week.  They have been bothering her more so with the season change but she has had symptoms on and off for the past few months.  She has been taking over-the-counter antihistamine regularly but recently started Flonase 3 days ago.  History of left cataract removal.  She's been told that she should have the right one removed as well.  Symptoms described as itchy, burning, \"sandpapery\" feeling.  Denies eyes being swollen shut.  Denies discharge from the eyes.       I have reviewed the patient's medical history in detail and updated the computerized patient record.    Review of Systems   Constitutional: Negative for chills, fatigue and fever.   HENT: Negative for congestion, rhinorrhea and sinus pressure.    Eyes: Positive for pain, redness and itching. Negative for photophobia, discharge and visual disturbance.   Respiratory: Negative for chest tightness and shortness of breath.    Cardiovascular: Negative for chest pain.   Gastrointestinal: Negative for abdominal pain.   Musculoskeletal: Negative for arthralgias.   Skin: Negative for rash and wound.   Allergic/Immunologic: Negative for environmental allergies.   Neurological: Negative for numbness and headaches.   Hematological: Negative for adenopathy.       /84 (BP Location: Left arm, Patient Position: Sitting, Cuff Size: Adult)  Pulse 77  Resp 16  Ht 64.5\" (163.8 cm)  Wt 135 lb (61.2 kg)  SpO2 97%  BMI 22.81 kg/m2     Physical Exam   Constitutional: She is oriented to person, place, and time. She appears well-developed and well-nourished.   HENT:   Head: Normocephalic and atraumatic.   Right Ear: External ear normal.   Left Ear: External ear normal.   Nose: Nose normal.   Eyes: EOM and lids are normal. Pupils are equal, round, and reactive to light. " Right conjunctiva is injected. Left conjunctiva is injected.   Neck: Normal range of motion.   Pulmonary/Chest: Effort normal.   Neurological: She is alert and oriented to person, place, and time.   Skin: Skin is warm and dry. No rash noted.   Psychiatric: She has a normal mood and affect. Her behavior is normal.   Nursing note and vitals reviewed.       Diagnoses and all orders for this visit:    Allergic conjunctivitis, bilateral  -     MethylPREDNISolone (MEDROL, DENISE,) 4 MG tablet; Take as directed on package instructions.    Cataracts, bilateral    Seasonal allergic rhinitis due to pollen      Start Medrol Dosepak.  Can also try over-the-counter Zaditor eyedrops.  Visual acuity abnormal but in the setting of cataract history, think this is likely the culprit.

## 2017-04-25 ENCOUNTER — TELEPHONE (OUTPATIENT)
Dept: GASTROENTEROLOGY | Facility: CLINIC | Age: 77
End: 2017-04-25

## 2017-04-25 NOTE — TELEPHONE ENCOUNTER
----- Message from Dileep Paula sent at 4/25/2017  2:08 PM EDT -----  Regarding: C/S RESULTS   Contact: [phone]  PT CALLED NEED C/S PATH REPORT FROM A MONTH AGO..

## 2017-04-26 NOTE — TELEPHONE ENCOUNTER
Called pt back. Advised that per Dr Hidalgo: path results were normal and she can f/u as needed. Pt verb understanding. Path results mailed to pt per her request after address verified.

## 2017-04-26 NOTE — TELEPHONE ENCOUNTER
Notes Recorded by Kyle Hidalgo MD on 4/26/2017 at 3:25 PM  Path normal, f/u as discussed with patient.

## 2017-04-28 RX ORDER — LEVOTHYROXINE SODIUM 0.1 MG/1
TABLET ORAL
Qty: 30 TABLET | Refills: 0 | Status: SHIPPED | OUTPATIENT
Start: 2017-04-28 | End: 2017-06-24 | Stop reason: SDUPTHER

## 2017-05-16 ENCOUNTER — DOCUMENTATION (OUTPATIENT)
Dept: PHYSICAL THERAPY | Facility: CLINIC | Age: 77
End: 2017-05-16

## 2017-05-16 DIAGNOSIS — M25.511 RIGHT SHOULDER PAIN, UNSPECIFIED CHRONICITY: Primary | ICD-10-CM

## 2017-06-26 RX ORDER — LEVOTHYROXINE SODIUM 0.1 MG/1
TABLET ORAL
Qty: 30 TABLET | Refills: 0 | Status: SHIPPED | OUTPATIENT
Start: 2017-06-26 | End: 2017-07-22 | Stop reason: SDUPTHER

## 2017-07-24 RX ORDER — LEVOTHYROXINE SODIUM 0.1 MG/1
TABLET ORAL
Qty: 30 TABLET | Refills: 0 | Status: SHIPPED | OUTPATIENT
Start: 2017-07-24 | End: 2017-08-22 | Stop reason: SDUPTHER

## 2017-08-17 ENCOUNTER — OFFICE VISIT (OUTPATIENT)
Dept: SPORTS MEDICINE | Facility: CLINIC | Age: 77
End: 2017-08-17

## 2017-08-17 VITALS
BODY MASS INDEX: 22.2 KG/M2 | HEIGHT: 64 IN | SYSTOLIC BLOOD PRESSURE: 124 MMHG | DIASTOLIC BLOOD PRESSURE: 72 MMHG | WEIGHT: 130 LBS

## 2017-08-17 DIAGNOSIS — G89.29 CHRONIC PAIN OF RIGHT KNEE: ICD-10-CM

## 2017-08-17 DIAGNOSIS — M79.644 THUMB PAIN, RIGHT: Primary | ICD-10-CM

## 2017-08-17 DIAGNOSIS — M17.11 PRIMARY OSTEOARTHRITIS OF RIGHT KNEE: ICD-10-CM

## 2017-08-17 DIAGNOSIS — M25.561 CHRONIC PAIN OF RIGHT KNEE: ICD-10-CM

## 2017-08-17 DIAGNOSIS — M65.311 TRIGGER THUMB OF RIGHT HAND: ICD-10-CM

## 2017-08-17 PROCEDURE — 99214 OFFICE O/P EST MOD 30 MIN: CPT | Performed by: FAMILY MEDICINE

## 2017-08-17 PROCEDURE — 90670 PCV13 VACCINE IM: CPT | Performed by: FAMILY MEDICINE

## 2017-08-17 PROCEDURE — 73130 X-RAY EXAM OF HAND: CPT | Performed by: FAMILY MEDICINE

## 2017-08-17 PROCEDURE — 90471 IMMUNIZATION ADMIN: CPT | Performed by: FAMILY MEDICINE

## 2017-08-17 PROCEDURE — 73562 X-RAY EXAM OF KNEE 3: CPT | Performed by: FAMILY MEDICINE

## 2017-08-17 NOTE — PROGRESS NOTES
"Jael is a 77 y.o. year old female    Chief Complaint   Patient presents with   • Right Arm - Pain       History of Present Illness  This morning she fell onto her right side (unsure why she fell, was in the dark at her daughter's home b/c construction at her condo). Bumped her right knee and having pain there, but also notes more chronic right knee pain, aching, worse with stairs, intermittent.   Also noted some tingling on the right side of the face after bumping her right cheek into something. Coming and going today, mild.   R thumb pain preceding this fall, radiates from the distal part of the finger up to the wrist. Assoc with painful popping.     I have reviewed the patient's medical history in detail and updated the computerized patient record.    Review of Systems   Musculoskeletal: Negative for joint swelling.   Skin: Negative for wound.   Neurological: Negative for dizziness, weakness, light-headedness and headaches.       /72  Ht 64\" (162.6 cm)  Wt 130 lb (59 kg)  BMI 22.31 kg/m2     Physical Exam    Vital signs reviewed.   General: No acute distress.  Eyes: conjunctiva clear; pupils equally round and reactive  ENT: external ears and nose atraumatic; oropharynx clear  CV: no peripheral edema, 2+ distal pulses  Resp: normal respiratory effort, no use of accessory muscles  Skin: no rashes or wounds; normal turgor  Psych: mood and affect appropriate; recent and remote memory intact; normal facies  Neuro: sensation to light touch intact    MSK Exam:  R knee: Normal appearance. No effusion or bruising. Mild joint line TTP. Normal ROM.    R hand: Tender flexor tendon at the thumb with palpable triggering.     Right Knee X-Ray  Indication: Pain    Views: AP, Lateral, and [place]    Findings:  No fracture  No bony lesion  Normal soft tissues  Mild degenerative changes    No prior studies were available for comparison.    Right Hand X-Ray  Indication: Pain  AP, Lateral, and Oblique views    Findings:  No " fracture  No bony lesion  Normal soft tissues  Mild diffuse degenerative changes    No prior studies were available for comparison.      Diagnoses and all orders for this visit:    Thumb pain, right  -     XR Hand 3+ View Right    Chronic pain of right knee  -     XR Knee 3+ View With Sunrise Right    Trigger thumb of right hand  -     Ambulatory Referral to Hand Surgery    Primary osteoarthritis of right knee    Other orders  -     Pneumococcal Conjugate Vaccine 13-Valent All      Suspect trigeminal nerve contusion, tingling should resolve.  Trigger thumb - place in spica brace and refer to hand surgery.  Discussed treatment options for knee OA, will monitor for now.     Recommend f/u after her upcoming trip for full cpe/awv.

## 2017-08-23 RX ORDER — LEVOTHYROXINE SODIUM 0.1 MG/1
TABLET ORAL
Qty: 30 TABLET | Refills: 0 | Status: SHIPPED | OUTPATIENT
Start: 2017-08-23 | End: 2017-11-08 | Stop reason: SDUPTHER

## 2017-09-19 ENCOUNTER — OFFICE VISIT (OUTPATIENT)
Dept: SPORTS MEDICINE | Facility: CLINIC | Age: 77
End: 2017-09-19

## 2017-09-19 VITALS
DIASTOLIC BLOOD PRESSURE: 80 MMHG | SYSTOLIC BLOOD PRESSURE: 130 MMHG | OXYGEN SATURATION: 98 % | HEART RATE: 84 BPM | BODY MASS INDEX: 22.02 KG/M2 | HEIGHT: 64 IN | WEIGHT: 129 LBS

## 2017-09-19 DIAGNOSIS — I10 ESSENTIAL HYPERTENSION: ICD-10-CM

## 2017-09-19 DIAGNOSIS — R26.89 BALANCE PROBLEM: ICD-10-CM

## 2017-09-19 DIAGNOSIS — R73.02 IMPAIRED GLUCOSE TOLERANCE: ICD-10-CM

## 2017-09-19 DIAGNOSIS — Z00.00 MEDICARE ANNUAL WELLNESS VISIT, SUBSEQUENT: Primary | ICD-10-CM

## 2017-09-19 DIAGNOSIS — R31.29 MICROSCOPIC HEMATURIA: ICD-10-CM

## 2017-09-19 DIAGNOSIS — Z12.31 ENCOUNTER FOR SCREENING MAMMOGRAM FOR MALIGNANT NEOPLASM OF BREAST: ICD-10-CM

## 2017-09-19 DIAGNOSIS — IMO0002 CYSTOCELE, UNSPECIFIED CYSTOCELE LOCATION: ICD-10-CM

## 2017-09-19 DIAGNOSIS — H26.9 CATARACT: ICD-10-CM

## 2017-09-19 DIAGNOSIS — R89.9 ABNORMAL LABORATORY TEST: ICD-10-CM

## 2017-09-19 DIAGNOSIS — E78.2 MIXED HYPERLIPIDEMIA: ICD-10-CM

## 2017-09-19 DIAGNOSIS — E03.9 HYPOTHYROIDISM, UNSPECIFIED TYPE: ICD-10-CM

## 2017-09-19 DIAGNOSIS — S83.91XA SPRAIN OF RIGHT KNEE, UNSPECIFIED LIGAMENT, INITIAL ENCOUNTER: ICD-10-CM

## 2017-09-19 DIAGNOSIS — E55.9 VITAMIN D DEFICIENCY: ICD-10-CM

## 2017-09-19 PROCEDURE — 90662 IIV NO PRSV INCREASED AG IM: CPT | Performed by: FAMILY MEDICINE

## 2017-09-19 PROCEDURE — G0439 PPPS, SUBSEQ VISIT: HCPCS | Performed by: FAMILY MEDICINE

## 2017-09-19 PROCEDURE — G0008 ADMIN INFLUENZA VIRUS VAC: HCPCS | Performed by: FAMILY MEDICINE

## 2017-09-19 PROCEDURE — 96160 PT-FOCUSED HLTH RISK ASSMT: CPT | Performed by: FAMILY MEDICINE

## 2017-09-19 PROCEDURE — 99397 PER PM REEVAL EST PAT 65+ YR: CPT | Performed by: FAMILY MEDICINE
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
## 2017-09-20 LAB
25(OH)D3+25(OH)D2 SERPL-MCNC: 25.9 NG/ML (ref 30–100)
ALBUMIN SERPL-MCNC: 4.7 G/DL (ref 3.5–5.2)
ALBUMIN/GLOB SERPL: 2 G/DL
ALP SERPL-CCNC: 94 U/L (ref 39–117)
ALT SERPL-CCNC: 21 U/L (ref 1–33)
APPEARANCE UR: CLEAR
AST SERPL-CCNC: 19 U/L (ref 1–32)
BACTERIA #/AREA URNS HPF: ABNORMAL /HPF
BASOPHILS # BLD AUTO: 0.02 10*3/MM3 (ref 0–0.2)
BASOPHILS NFR BLD AUTO: 0.3 % (ref 0–1.5)
BILIRUB SERPL-MCNC: 0.5 MG/DL (ref 0.1–1.2)
BILIRUB UR QL STRIP: NEGATIVE
BUN SERPL-MCNC: 19 MG/DL (ref 8–23)
BUN/CREAT SERPL: 26.4 (ref 7–25)
CALCIUM SERPL-MCNC: 10.2 MG/DL (ref 8.6–10.5)
CHLORIDE SERPL-SCNC: 101 MMOL/L (ref 98–107)
CHOLEST SERPL-MCNC: 237 MG/DL (ref 0–200)
CK SERPL-CCNC: 48 U/L (ref 20–180)
CO2 SERPL-SCNC: 27.9 MMOL/L (ref 22–29)
COLOR UR: YELLOW
CREAT SERPL-MCNC: 0.72 MG/DL (ref 0.57–1)
CRYSTALS URNS MICRO: ABNORMAL
EOSINOPHIL # BLD AUTO: 0.17 10*3/MM3 (ref 0–0.7)
EOSINOPHIL NFR BLD AUTO: 2.5 % (ref 0.3–6.2)
EPI CELLS #/AREA URNS HPF: ABNORMAL /HPF
ERYTHROCYTE [DISTWIDTH] IN BLOOD BY AUTOMATED COUNT: 12.5 % (ref 11.7–13)
GLOBULIN SER CALC-MCNC: 2.4 GM/DL
GLUCOSE SERPL-MCNC: 110 MG/DL (ref 65–99)
GLUCOSE UR QL: NEGATIVE
HBA1C MFR BLD: 6.1 % (ref 4.8–5.6)
HCT VFR BLD AUTO: 38.7 % (ref 35.6–45.5)
HDLC SERPL-MCNC: 45 MG/DL (ref 40–60)
HGB BLD-MCNC: 12.7 G/DL (ref 11.9–15.5)
HGB UR QL STRIP: NEGATIVE
IMM GRANULOCYTES # BLD: 0.04 10*3/MM3 (ref 0–0.03)
IMM GRANULOCYTES NFR BLD: 0.6 % (ref 0–0.5)
KETONES UR QL STRIP: NEGATIVE
LDLC SERPL CALC-MCNC: 163 MG/DL (ref 0–100)
LEUKOCYTE ESTERASE UR QL STRIP: NEGATIVE
LYMPHOCYTES # BLD AUTO: 1.93 10*3/MM3 (ref 0.9–4.8)
LYMPHOCYTES NFR BLD AUTO: 28.6 % (ref 19.6–45.3)
MCH RBC QN AUTO: 30.8 PG (ref 26.9–32)
MCHC RBC AUTO-ENTMCNC: 32.8 G/DL (ref 32.4–36.3)
MCV RBC AUTO: 93.9 FL (ref 80.5–98.2)
MICRO URNS: NORMAL
MICRO URNS: NORMAL
MONOCYTES # BLD AUTO: 0.65 10*3/MM3 (ref 0.2–1.2)
MONOCYTES NFR BLD AUTO: 9.6 % (ref 5–12)
MUCOUS THREADS URNS QL MICRO: PRESENT /HPF
NEUTROPHILS # BLD AUTO: 3.95 10*3/MM3 (ref 1.9–8.1)
NEUTROPHILS NFR BLD AUTO: 58.4 % (ref 42.7–76)
NITRITE UR QL STRIP: NEGATIVE
PH UR STRIP: 5.5 [PH] (ref 5–7.5)
PLATELET # BLD AUTO: 345 10*3/MM3 (ref 140–500)
POTASSIUM SERPL-SCNC: 4.3 MMOL/L (ref 3.5–5.2)
PROT SERPL-MCNC: 7.1 G/DL (ref 6–8.5)
PROT UR QL STRIP: NEGATIVE
RBC # BLD AUTO: 4.12 10*6/MM3 (ref 3.9–5.2)
RBC #/AREA URNS HPF: ABNORMAL /HPF
SODIUM SERPL-SCNC: 142 MMOL/L (ref 136–145)
SP GR UR: 1.02 (ref 1–1.03)
T4 FREE SERPL-MCNC: 1.51 NG/DL (ref 0.93–1.7)
TRIGL SERPL-MCNC: 143 MG/DL (ref 0–150)
TSH SERPL DL<=0.005 MIU/L-ACNC: 2.32 MIU/ML (ref 0.27–4.2)
UNIDENT CRYS URNS QL MICRO: PRESENT /LPF
URINALYSIS REFLEX: NORMAL
UROBILINOGEN UR STRIP-MCNC: 0.2 MG/DL (ref 0.2–1)
VLDLC SERPL CALC-MCNC: 28.6 MG/DL (ref 5–40)
WBC # BLD AUTO: 6.76 10*3/MM3 (ref 4.5–10.7)
WBC #/AREA URNS HPF: ABNORMAL /HPF

## 2017-10-02 ENCOUNTER — TREATMENT (OUTPATIENT)
Dept: PHYSICAL THERAPY | Facility: CLINIC | Age: 77
End: 2017-10-02

## 2017-10-02 DIAGNOSIS — M25.561 CHRONIC PAIN OF RIGHT KNEE: Primary | ICD-10-CM

## 2017-10-02 DIAGNOSIS — R26.89 BALANCE PROBLEM: ICD-10-CM

## 2017-10-02 DIAGNOSIS — G89.29 CHRONIC PAIN OF RIGHT KNEE: Primary | ICD-10-CM

## 2017-10-02 PROCEDURE — G8978 MOBILITY CURRENT STATUS: HCPCS | Performed by: PHYSICAL THERAPIST

## 2017-10-02 PROCEDURE — G8979 MOBILITY GOAL STATUS: HCPCS | Performed by: PHYSICAL THERAPIST

## 2017-10-02 PROCEDURE — 97162 PT EVAL MOD COMPLEX 30 MIN: CPT | Performed by: PHYSICAL THERAPIST
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
## 2017-10-05 ENCOUNTER — TREATMENT (OUTPATIENT)
Dept: PHYSICAL THERAPY | Facility: CLINIC | Age: 77
End: 2017-10-05

## 2017-10-05 ENCOUNTER — TELEPHONE (OUTPATIENT)
Dept: SPORTS MEDICINE | Facility: CLINIC | Age: 77
End: 2017-10-05

## 2017-10-05 DIAGNOSIS — G89.29 CHRONIC PAIN OF RIGHT KNEE: Primary | ICD-10-CM

## 2017-10-05 DIAGNOSIS — M25.561 CHRONIC PAIN OF RIGHT KNEE: Primary | ICD-10-CM

## 2017-10-05 DIAGNOSIS — R26.89 BALANCE PROBLEM: ICD-10-CM

## 2017-10-05 PROCEDURE — 97110 THERAPEUTIC EXERCISES: CPT | Performed by: PHYSICAL THERAPIST

## 2017-10-05 NOTE — PROGRESS NOTES
Physical Therapy Daily Progress Note    Subjective   Pt reports she woke up with both legs in pain, but pain went away with walking.    Objective   TTP R medial hamstring tendons    See Exercise, Manual, and Modality Logs for complete treatment.       Assessment/Plan  Pt became tearful during NuStep and quadriceps activation exercises. Extent of OKC strengthening limited by pain and fatigue. Educated pt as to how strengthening is useful to decrease knee pain. Issued HEP. Will continue to benefit from skilled PT to improve LE and core strength to improve function and balance.                 Manual Therapy:    0     mins  25151;  Therapeutic Exercise:    31     mins  87471;     Neuromuscular Homa:    5    mins  18066;    Therapeutic Activity:     0     mins  88746;     Gait Trainin     mins  47400;     Ultrasound:     0     mins  60176;    Work Hardening           0      mins 25144  Iontophoresis               0   mins 79614    Timed Treatment:   36   mins   Total Treatment:     43   mins    Jillian George, PT  Physical Therapist

## 2017-10-05 NOTE — TELEPHONE ENCOUNTER
----- Message from Keaton Dao MD sent at 9/20/2017 10:11 PM EDT -----  Lab results:  - Prediabetes is slightly worse than it was last year. This should be fine with appropriate attention to low carbohydrate diet and exercise, but don't lose track of it.   - Cholesterol is also slightly worse since last year. Using statin medicines (like Lipitor) is debatable in patients at your age otherwise at low risk. If the prediabetes worsens, we will definitely need to use it to lower your risk of cardiovascular disease. Currently, the statistics would recommend using medicine but I understand you want to avoid it if possible. Let's follow up on both the lipids and a1c in 6 months and hopefully we can avoid meds.   - Vitamin D remains slightly low, recommend increase to 5000 units daily.

## 2017-10-10 ENCOUNTER — TREATMENT (OUTPATIENT)
Dept: PHYSICAL THERAPY | Facility: CLINIC | Age: 77
End: 2017-10-10

## 2017-10-10 DIAGNOSIS — G89.29 CHRONIC PAIN OF RIGHT KNEE: Primary | ICD-10-CM

## 2017-10-10 DIAGNOSIS — R26.89 BALANCE PROBLEM: ICD-10-CM

## 2017-10-10 DIAGNOSIS — M25.561 CHRONIC PAIN OF RIGHT KNEE: Primary | ICD-10-CM

## 2017-10-10 PROCEDURE — 97110 THERAPEUTIC EXERCISES: CPT | Performed by: PHYSICAL THERAPIST

## 2017-10-10 PROCEDURE — 97140 MANUAL THERAPY 1/> REGIONS: CPT | Performed by: PHYSICAL THERAPIST

## 2017-10-10 NOTE — PROGRESS NOTES
Physical Therapy Daily Progress Note    Subjective   Pt reports both knees really hurt after last session. Has only done HEP twice and she is disappointed in herself. Pt states she noticed R eye drooping in pictures from vacation and wonders what all is going on with her right side. Pt reports she needs someone to help her keep on track with HEP at home; she does not have confidence in herself.    Objective   See Exercise, Manual, and Modality Logs for complete treatment.       Assessment/Plan  No obvious R eye drooping at this time. Pt had slightly improved tolerance to therapeutic exercise but continues to require moderate encouragement throughout session. Mild extensor lag improved with verbal cuing. Progress per POC.                 Manual Therapy:    8     mins  97783;  Therapeutic Exercise:    25     mins  11805;     Neuromuscular Homa:    0    mins  89909;    Therapeutic Activity:     0     mins  79753;     Gait Trainin     mins  15555;     Ultrasound:     0     mins  39296;    Work Hardening           0      mins 96704  Iontophoresis               0   mins 60462    Timed Treatment:   33   mins   Total Treatment:     48   mins    Jillian George, PT  Physical Therapist

## 2017-10-12 ENCOUNTER — TREATMENT (OUTPATIENT)
Dept: PHYSICAL THERAPY | Facility: CLINIC | Age: 77
End: 2017-10-12

## 2017-10-12 DIAGNOSIS — M25.561 CHRONIC PAIN OF RIGHT KNEE: Primary | ICD-10-CM

## 2017-10-12 DIAGNOSIS — R26.89 BALANCE PROBLEM: ICD-10-CM

## 2017-10-12 DIAGNOSIS — G89.29 CHRONIC PAIN OF RIGHT KNEE: Primary | ICD-10-CM

## 2017-10-12 PROCEDURE — 97140 MANUAL THERAPY 1/> REGIONS: CPT | Performed by: PHYSICAL THERAPIST

## 2017-10-12 PROCEDURE — 97110 THERAPEUTIC EXERCISES: CPT | Performed by: PHYSICAL THERAPIST

## 2017-10-12 NOTE — PROGRESS NOTES
Physical Therapy Daily Progress Note    Subjective   Pt reports R knee is better but still painful going down steps.      Objective   See Exercise, Manual, and Modality Logs for complete treatment.       Assessment/Plan  Continues to have tenderness of R posterior knee. Demonstrated improved quadriceps strength and endurance with SLR. Good tolerance to increase in CKC with initiation of step ups. Will benefit from further CKC and eccentric strengthening.                Manual Therapy:    8     mins  53310;  Therapeutic Exercise:    25     mins  50340;     Neuromuscular Homa:    0    mins  60131;    Therapeutic Activity:     0     mins  53624;     Gait Trainin     mins  85545;     Ultrasound:     00     mins  98504;    Work Hardening           0      mins 33921  Iontophoresis               0   mins 77726    Timed Treatment:   33   mins   Total Treatment:     50   mins    Jillian George, PT  Physical Therapist

## 2017-10-17 ENCOUNTER — TREATMENT (OUTPATIENT)
Dept: PHYSICAL THERAPY | Facility: CLINIC | Age: 77
End: 2017-10-17

## 2017-10-17 DIAGNOSIS — R26.89 BALANCE PROBLEM: ICD-10-CM

## 2017-10-17 DIAGNOSIS — M25.561 CHRONIC PAIN OF RIGHT KNEE: Primary | ICD-10-CM

## 2017-10-17 DIAGNOSIS — G89.29 CHRONIC PAIN OF RIGHT KNEE: Primary | ICD-10-CM

## 2017-10-17 PROCEDURE — 97110 THERAPEUTIC EXERCISES: CPT | Performed by: PHYSICAL THERAPIST

## 2017-10-17 PROCEDURE — 97530 THERAPEUTIC ACTIVITIES: CPT | Performed by: PHYSICAL THERAPIST

## 2017-10-17 NOTE — PROGRESS NOTES
Physical Therapy Daily Progress Note    Subjective   Pt reports her knee is more bothersome, but she is not sure how much of it is mental or stress. Pt is overwhelmed with housing situation. States knee is painful on the posterior, medial, and lateral aspects, and she is having more difficulty getting up from seated position. Pt reports she is battling muscle cramps in both LE.    Objective   See Exercise, Manual, and Modality Logs for complete treatment.       Assessment/Plan  No significant soft tissue restrictions. Slight edema at medial aspect of knee. Pt demonstrated improved strength with OKC and CKC (step ups) strengthening w/o c/o pain. Required verbal cuing for activation of transverse abdominis as opposed to glutes during abdominal bracing. Gait is no longer antalgic. Informed pt that her knee is progressing well and encouraged her to do HEP more often. Progress per POC.                 Manual Therapy:    6     mins  87143;  Therapeutic Exercise:    20     mins  33288;     Neuromuscular Homa:    0    mins  57484;    Therapeutic Activity:     15     mins  36173;     Gait Trainin     mins  67774;     Ultrasound:     0     mins  99510;    Work Hardening           0      mins 41087  Iontophoresis               0   mins 20399    Timed Treatment:   41   mins   Total Treatment:     55   mins    Jillian George, PT  Physical Therapist

## 2017-10-19 ENCOUNTER — TREATMENT (OUTPATIENT)
Dept: PHYSICAL THERAPY | Facility: CLINIC | Age: 77
End: 2017-10-19

## 2017-10-19 DIAGNOSIS — R26.89 BALANCE PROBLEM: ICD-10-CM

## 2017-10-19 DIAGNOSIS — M25.561 CHRONIC PAIN OF RIGHT KNEE: Primary | ICD-10-CM

## 2017-10-19 DIAGNOSIS — G89.29 CHRONIC PAIN OF RIGHT KNEE: Primary | ICD-10-CM

## 2017-10-19 PROCEDURE — 97110 THERAPEUTIC EXERCISES: CPT | Performed by: PHYSICAL THERAPIST

## 2017-10-19 PROCEDURE — 97530 THERAPEUTIC ACTIVITIES: CPT | Performed by: PHYSICAL THERAPIST

## 2017-10-19 NOTE — PROGRESS NOTES
Physical Therapy Daily Progress Note    Subjective   Pt reports she had cramps surrounding R knee this morning but was able to walk it out. She feels stronger when walking and is tipping to her R less.    Objective   See Exercise, Manual, and Modality Logs for complete treatment.       Assessment/Plan  Pt had mild increase sway on foam with eyes closed but no LOB. Required verbal encouragement on wobble board to decrease frustration. Demonstrated improved strength with all ther ex. Will continue to benefit from skilled PT to improve LE strength, balance, and confidence and to decrease R knee pain.                 Manual Therapy:    0     mins  35848;  Therapeutic Exercise:    26     mins  51537;     Neuromuscular Homa:    6    mins  93671;    Therapeutic Activity:     6     mins  80484;     Gait Trainin     mins  01608;     Ultrasound:     0     mins  33438;    Work Hardening           0      mins 01290  Iontophoresis               0   mins 79323    Timed Treatment:   38   mins   Total Treatment:     45   mins    Jillian George, PT  Physical Therapist

## 2017-10-24 ENCOUNTER — TREATMENT (OUTPATIENT)
Dept: PHYSICAL THERAPY | Facility: CLINIC | Age: 77
End: 2017-10-24

## 2017-10-24 DIAGNOSIS — G89.29 CHRONIC PAIN OF RIGHT KNEE: Primary | ICD-10-CM

## 2017-10-24 DIAGNOSIS — M25.561 CHRONIC PAIN OF RIGHT KNEE: Primary | ICD-10-CM

## 2017-10-24 DIAGNOSIS — R26.89 BALANCE PROBLEM: ICD-10-CM

## 2017-10-24 PROCEDURE — 97530 THERAPEUTIC ACTIVITIES: CPT | Performed by: PHYSICAL THERAPIST

## 2017-10-24 PROCEDURE — 97110 THERAPEUTIC EXERCISES: CPT | Performed by: PHYSICAL THERAPIST

## 2017-10-24 NOTE — PROGRESS NOTES
Physical Therapy Daily Progress Note    Subjective   Pt reports she still has some pain in R lateral knee. She was only able to sleep for 2.5 hours last night due to home stress, so she is very tired.    Objective   See Exercise, Manual, and Modality Logs for complete treatment.       Assessment/Plan  Pt continues to demonstrate improvements in strength. Required verbal cues to prevent R knee valgus during squats and step down. Progress per POC.               Manual Therapy:    0     mins  71992;  Therapeutic Exercise:    30     mins  53927;     Neuromuscular Homa:    3    mins  25790;    Therapeutic Activity:     6     mins  71251;     Gait Trainin     mins  62743;     Ultrasound:     0     mins  94532;    Work Hardening           0      mins 27486  Iontophoresis               0   mins 62001    Timed Treatment:   39   mins   Total Treatment:     45   mins    Jillian George, PT  Physical Therapist

## 2017-10-31 ENCOUNTER — TREATMENT (OUTPATIENT)
Dept: PHYSICAL THERAPY | Facility: CLINIC | Age: 77
End: 2017-10-31

## 2017-10-31 DIAGNOSIS — R26.89 BALANCE PROBLEM: ICD-10-CM

## 2017-10-31 DIAGNOSIS — G89.29 CHRONIC PAIN OF RIGHT KNEE: Primary | ICD-10-CM

## 2017-10-31 DIAGNOSIS — M25.561 CHRONIC PAIN OF RIGHT KNEE: Primary | ICD-10-CM

## 2017-10-31 PROCEDURE — 97110 THERAPEUTIC EXERCISES: CPT | Performed by: PHYSICAL THERAPIST

## 2017-10-31 PROCEDURE — 97112 NEUROMUSCULAR REEDUCATION: CPT | Performed by: PHYSICAL THERAPIST

## 2017-10-31 PROCEDURE — 97530 THERAPEUTIC ACTIVITIES: CPT | Performed by: PHYSICAL THERAPIST

## 2017-10-31 NOTE — PROGRESS NOTES
Physical Therapy Daily Progress Note    Subjective   Pt reports knee is improving. Continues to have pain when she first stands up and with stairs.    Objective   See Exercise, Manual, and Modality Logs for complete treatment.       Assessment/Plan  Pt demonstrated no significant difficulty with 6inch step ups w/o rail. No significant deviations in gait. Good balance on foam and in single leg stance. Continues to report mild pain in R knee. Progress functional strengthening as tolerated.                 Manual Therapy:    0     mins  43564;  Therapeutic Exercise:    25     mins  00443;     Neuromuscular Homa:    5    mins  47771;    Therapeutic Activity:     10     mins  38004;     Gait Trainin     mins  39938;     Ultrasound:     0     mins  74634;    Work Hardening           0      mins 12623  Iontophoresis               0   mins 78490    Timed Treatment:   40   mins   Total Treatment:     45   mins    Jillian George, PT  Physical Therapist

## 2017-11-02 ENCOUNTER — TREATMENT (OUTPATIENT)
Dept: PHYSICAL THERAPY | Facility: CLINIC | Age: 77
End: 2017-11-02

## 2017-11-02 DIAGNOSIS — G89.29 CHRONIC PAIN OF RIGHT KNEE: Primary | ICD-10-CM

## 2017-11-02 DIAGNOSIS — M25.561 CHRONIC PAIN OF RIGHT KNEE: Primary | ICD-10-CM

## 2017-11-02 DIAGNOSIS — R26.89 BALANCE PROBLEM: ICD-10-CM

## 2017-11-02 PROCEDURE — 97110 THERAPEUTIC EXERCISES: CPT | Performed by: PHYSICAL THERAPIST

## 2017-11-02 PROCEDURE — 97530 THERAPEUTIC ACTIVITIES: CPT | Performed by: PHYSICAL THERAPIST

## 2017-11-02 NOTE — PROGRESS NOTES
Physical Therapy Daily Progress Note    Subjective   Pt reports less pain when going down steps. Her daughter's house has 16 steps.    Objective   See Exercise, Manual, and Modality Logs for complete treatment.       Assessment/Plan  Required verbal cues with multiple exercises to correct dynamic knee valgus. Pt able to correct somewhat but continues to have hip abduction weakness. Demonstrated good LE strength and balance during step ups with knee drive. Progress per POC.                 Manual Therapy:    0     mins  12582;  Therapeutic Exercise:    25     mins  52199;     Neuromuscular Homa:    0    mins  30724;    Therapeutic Activity:     10     mins  31846;     Gait Trainin     mins  77116;     Ultrasound:     0     mins  36493;    Work Hardening           0      mins 25645  Iontophoresis               0   mins 82258    Timed Treatment:   35   mins   Total Treatment:     45   mins    Jillian George, PT  Physical Therapist

## 2017-11-07 ENCOUNTER — TREATMENT (OUTPATIENT)
Dept: PHYSICAL THERAPY | Facility: CLINIC | Age: 77
End: 2017-11-07

## 2017-11-07 DIAGNOSIS — G89.29 CHRONIC PAIN OF RIGHT KNEE: Primary | ICD-10-CM

## 2017-11-07 DIAGNOSIS — R26.89 BALANCE PROBLEM: ICD-10-CM

## 2017-11-07 DIAGNOSIS — M25.561 CHRONIC PAIN OF RIGHT KNEE: Primary | ICD-10-CM

## 2017-11-07 PROCEDURE — 97530 THERAPEUTIC ACTIVITIES: CPT | Performed by: PHYSICAL THERAPIST

## 2017-11-07 PROCEDURE — 97110 THERAPEUTIC EXERCISES: CPT | Performed by: PHYSICAL THERAPIST

## 2017-11-07 NOTE — PROGRESS NOTES
Physical Therapy Daily Progress Note    Subjective   Pt reports her knee is better, but she continues to have pain going down stairs. At times she has pain in lateral aspect of knee, but that has improved.    Objective   See Exercise, Manual, and Modality Logs for complete treatment.       Assessment/Plan  Continues to require verbal and tactile cuing to prevent dynamic knee valgus. Demonstrated single leg lowering on 6 inch step w/o c/o pain. Progress per POC.                 Manual Therapy:    0     mins  85183;  Therapeutic Exercise:    40     mins  95691;     Neuromuscular Homa:    0    mins  89464;    Therapeutic Activity:     10     mins  72843;     Gait Trainin     mins  83930;     Ultrasound:     0     mins  61533;    Work Hardening           0      mins 54020  Iontophoresis               0   mins 13405    Timed Treatment:   50   mins   Total Treatment:     60   mins    Jillian George, PT  Physical Therapist

## 2017-11-08 RX ORDER — LEVOTHYROXINE SODIUM 0.1 MG/1
TABLET ORAL
Qty: 30 TABLET | Refills: 0 | Status: SHIPPED | OUTPATIENT
Start: 2017-11-08 | End: 2017-12-14 | Stop reason: SDUPTHER

## 2017-11-09 ENCOUNTER — TREATMENT (OUTPATIENT)
Dept: PHYSICAL THERAPY | Facility: CLINIC | Age: 77
End: 2017-11-09

## 2017-11-09 DIAGNOSIS — M25.561 CHRONIC PAIN OF RIGHT KNEE: Primary | ICD-10-CM

## 2017-11-09 DIAGNOSIS — G89.29 CHRONIC PAIN OF RIGHT KNEE: Primary | ICD-10-CM

## 2017-11-09 DIAGNOSIS — R26.89 BALANCE PROBLEM: ICD-10-CM

## 2017-11-09 PROCEDURE — 97530 THERAPEUTIC ACTIVITIES: CPT | Performed by: PHYSICAL THERAPIST

## 2017-11-09 PROCEDURE — 97110 THERAPEUTIC EXERCISES: CPT | Performed by: PHYSICAL THERAPIST

## 2017-11-14 ENCOUNTER — TREATMENT (OUTPATIENT)
Dept: PHYSICAL THERAPY | Facility: CLINIC | Age: 77
End: 2017-11-14

## 2017-11-14 DIAGNOSIS — R26.89 BALANCE PROBLEM: ICD-10-CM

## 2017-11-14 DIAGNOSIS — M25.561 CHRONIC PAIN OF RIGHT KNEE: Primary | ICD-10-CM

## 2017-11-14 DIAGNOSIS — G89.29 CHRONIC PAIN OF RIGHT KNEE: Primary | ICD-10-CM

## 2017-11-14 PROCEDURE — 97110 THERAPEUTIC EXERCISES: CPT | Performed by: PHYSICAL THERAPIST

## 2017-11-14 PROCEDURE — 97530 THERAPEUTIC ACTIVITIES: CPT | Performed by: PHYSICAL THERAPIST

## 2017-11-14 NOTE — PROGRESS NOTES
Re-Assessment / Re-Certification        Patient: Jael Navarro   : 1940  Diagnosis/ICD-10 Code:  Chronic pain of right knee [M25.561, G89.29]  Referring practitioner: Keaton Dao MD  Date of Initial Visit: 2017  Today's Date: 2017  Patient seen for 12 sessions      Subjective:   Jael Navarro reports: Pt reports she has a little bit of pain in R knee when going down stairs. Every once in a while she has pain in lateral aspect of knee. Knee is much better than it was.  Subjective Questionnaire: LEFS: 52/80 (40/80 at IE)  Clinical Progress: improved  Home Program Compliance: Yes  Treatment has included: therapeutic exercise, manual therapy and cryotherapy    Subjective   Objective       Palpation     Right Tenderness of the distal semimembranosus.     Tenderness     Right Knee   Tenderness in the lateral joint line.     Active Range of Motion     Right Knee   Flexion: WFL  Extension: WFL    Strength/Myotome Testing     Left Hip   Planes of Motion   Flexion: 4+  Extension: 4-  Abduction: 5    Right Hip   Planes of Motion   Flexion: 4  Extension: 4-  Abduction: 4    Right Knee   Flexion: 5  Extension: 5     Assessment/Plan  Progress toward previous goals: Partially Met  Pt has less pain with functional activity, no reports of LOB, and significantly improved tolerance to therapeutic exercise. No significant deviations in gait, and patient is able to ascend/descend stairs using reciprocal steps and no rail. Continues to have significant LE weakness and knee valgus with step ups/squats. Will continue to benefit from skilled PT to improve LE strength and stability, so that she will have less pain w/ ADLs.    New Goals  Short-term goals (STG): 2-4 weeks. Patient will:  1. Have at least 4+/5 R LE strength allowing for ADLs and balance  2. Perform body weight squat w/o significant knee valgus, demonstrating adequate strength for ADLs  3. Perform SL balance >10sec demonstrating balance required  for navigating uneven terrain  4. Perceived disability </= 15% as measured on LEFS  5. Be independent with long term HEP     Plan      Recommendations: Continue with recommendations 2x/week  Timeframe: 1 month  Prognosis to achieve goals: fair    PT Signature: Jillian George PT      Based upon review of the patient's progress and continued therapy plan, it is my medical opinion that Jael Navarro should continue physical therapy treatment at [facility]  [address] [place] [id number] [phone].    Signature: __________________________________  Keaton Dao MD    Manual Therapy:    0     mins  79768;  Therapeutic Exercise:    25     mins  06762;     Neuromuscular Homa:    0    mins  70500;    Therapeutic Activity:     15     mins  06631;     Gait Trainin     mins  04034;     Ultrasound:     0     mins  74104;    Work Hardening           0      mins 75005  Iontophoresis               0   mins 23353    Timed Treatment:   40   mins   Total Treatment:     45   mins

## 2017-11-16 ENCOUNTER — TREATMENT (OUTPATIENT)
Dept: PHYSICAL THERAPY | Facility: CLINIC | Age: 77
End: 2017-11-16

## 2017-11-16 DIAGNOSIS — M25.561 CHRONIC PAIN OF RIGHT KNEE: Primary | ICD-10-CM

## 2017-11-16 DIAGNOSIS — G89.29 CHRONIC PAIN OF RIGHT KNEE: Primary | ICD-10-CM

## 2017-11-16 DIAGNOSIS — R26.89 BALANCE PROBLEM: ICD-10-CM

## 2017-11-16 PROCEDURE — 97110 THERAPEUTIC EXERCISES: CPT | Performed by: PHYSICAL THERAPIST

## 2017-11-16 PROCEDURE — 97530 THERAPEUTIC ACTIVITIES: CPT | Performed by: PHYSICAL THERAPIST

## 2017-11-16 NOTE — PROGRESS NOTES
Physical Therapy Daily Progress Note    Subjective   Pt reports her knee is doing better. She made it through  yesterday and was not limited by knee. No cramps in the last 3 days.    Objective   See Exercise, Manual, and Modality Logs for complete treatment.       Assessment/Plan  Strength progressing slowly. No c/o pain with therapeutic exercise. Continues to require verbal cues to avoid knee valgus. Progress per POC.                 Manual Therapy:    0     mins  76740;  Therapeutic Exercise:    15     mins  52202;     Neuromuscular Homa:    0    mins  67914;    Therapeutic Activity:     15     mins  36521;     Gait Trainin     mins  82258;     Ultrasound:     0     mins  62955;    Work Hardening           0      mins 43725  Iontophoresis               0   mins 83015    Timed Treatment:   30   mins   Total Treatment:     56   mins    Jillian George, PT  Physical Therapist

## 2017-11-28 ENCOUNTER — TREATMENT (OUTPATIENT)
Dept: PHYSICAL THERAPY | Facility: CLINIC | Age: 77
End: 2017-11-28

## 2017-11-28 DIAGNOSIS — M25.561 CHRONIC PAIN OF RIGHT KNEE: Primary | ICD-10-CM

## 2017-11-28 DIAGNOSIS — G89.29 CHRONIC PAIN OF RIGHT KNEE: Primary | ICD-10-CM

## 2017-11-28 PROCEDURE — 97530 THERAPEUTIC ACTIVITIES: CPT | Performed by: PHYSICAL THERAPIST

## 2017-11-28 PROCEDURE — 97110 THERAPEUTIC EXERCISES: CPT | Performed by: PHYSICAL THERAPIST

## 2017-11-28 NOTE — PROGRESS NOTES
Physical Therapy Daily Progress Note    Subjective   Pt reports she is doing much better. [place] but [place] not [place]. She was able to walk gate to gate w/o significant pain.    Objective   See Exercise, Manual, and Modality Logs for complete treatment.       Assessment/Plan  Improved hip knee alignment during step ups w/o use of rail. Good tolerance to exercise w/o c/o pain. Required verbal cues for improved squat form. No LOB in clinic. Progress per POC.                   Manual Therapy:    0     mins  09683;  Therapeutic Exercise:    32     mins  45691;     Neuromuscular Homa:    0    mins  80893;    Therapeutic Activity:     10     mins  71694;     Gait Trainin     mins  62742;     Ultrasound:     0     mins  32920;    Work Hardening           0      mins 74285  Iontophoresis               0   mins 12337    Timed Treatment:   42   mins   Total Treatment:     50   mins    Jillian George, PT  Physical Therapist

## 2017-12-07 ENCOUNTER — TREATMENT (OUTPATIENT)
Dept: PHYSICAL THERAPY | Facility: CLINIC | Age: 77
End: 2017-12-07

## 2017-12-07 PROCEDURE — 97110 THERAPEUTIC EXERCISES: CPT | Performed by: PHYSICAL THERAPIST

## 2017-12-12 ENCOUNTER — TREATMENT (OUTPATIENT)
Dept: PHYSICAL THERAPY | Facility: CLINIC | Age: 77
End: 2017-12-12

## 2017-12-12 DIAGNOSIS — G89.29 CHRONIC PAIN OF RIGHT KNEE: Primary | ICD-10-CM

## 2017-12-12 DIAGNOSIS — R26.89 BALANCE PROBLEM: ICD-10-CM

## 2017-12-12 DIAGNOSIS — N30.00 ACUTE CYSTITIS WITHOUT HEMATURIA: Primary | ICD-10-CM

## 2017-12-12 DIAGNOSIS — M25.561 CHRONIC PAIN OF RIGHT KNEE: Primary | ICD-10-CM

## 2017-12-12 PROCEDURE — 97110 THERAPEUTIC EXERCISES: CPT | Performed by: PHYSICAL THERAPIST

## 2017-12-12 NOTE — PROGRESS NOTES
Physical Therapy Daily Progress Note    Subjective   Pt reports she is doing better.    Objective   See Exercise, Manual, and Modality Logs for complete treatment.       Assessment/Plan  Pt demonstrated improved ability to maintain hip knee alignment durings squats and stairs. Was able to perform body weight squats with resistance band at knees. Discussed plan to DC to HEP next week.               Manual Therapy:    0     mins  71629;  Therapeutic Exercise:    36     mins  96614;     Neuromuscular Homa:    0    mins  04738;    Therapeutic Activity:     0     mins  25042;     Gait Trainin     mins  80292;     Ultrasound:     0     mins  90358;    Work Hardening           0      mins 50878  Iontophoresis               0   mins 32755    Timed Treatment:   36   mins   Total Treatment:     40   mins    Jillian George, PT  Physical Therapist

## 2017-12-13 ENCOUNTER — TELEPHONE (OUTPATIENT)
Dept: SPORTS MEDICINE | Facility: CLINIC | Age: 77
End: 2017-12-13

## 2017-12-13 LAB
APPEARANCE UR: CLEAR
BACTERIA #/AREA URNS HPF: ABNORMAL /HPF
BILIRUB UR QL STRIP: NEGATIVE
COLOR UR: YELLOW
CRYSTALS URNS MICRO: ABNORMAL
EPI CELLS #/AREA URNS HPF: ABNORMAL /HPF
GLUCOSE UR QL: NEGATIVE
HGB UR QL STRIP: (no result)
KETONES UR QL STRIP: NEGATIVE
LEUKOCYTE ESTERASE UR QL STRIP: NEGATIVE
NITRITE UR QL STRIP: NEGATIVE
PH UR STRIP: 5.5 [PH] (ref 5–8)
PROT UR QL STRIP: NEGATIVE
RBC #/AREA URNS HPF: ABNORMAL /HPF
SP GR UR: 1.03 (ref 1–1.03)
UROBILINOGEN UR STRIP-MCNC: (no result) MG/DL
WBC #/AREA URNS HPF: ABNORMAL /HPF

## 2017-12-13 NOTE — TELEPHONE ENCOUNTER
----- Message from Keaton Dao MD sent at 12/13/2017  7:14 AM EST -----  Urine test does not show any infection.

## 2017-12-14 ENCOUNTER — TREATMENT (OUTPATIENT)
Dept: PHYSICAL THERAPY | Facility: CLINIC | Age: 77
End: 2017-12-14

## 2017-12-14 DIAGNOSIS — G89.29 CHRONIC PAIN OF RIGHT KNEE: Primary | ICD-10-CM

## 2017-12-14 DIAGNOSIS — M25.561 CHRONIC PAIN OF RIGHT KNEE: Primary | ICD-10-CM

## 2017-12-14 DIAGNOSIS — R26.89 BALANCE PROBLEM: ICD-10-CM

## 2017-12-14 PROCEDURE — 97110 THERAPEUTIC EXERCISES: CPT | Performed by: PHYSICAL THERAPIST

## 2017-12-14 RX ORDER — LEVOTHYROXINE SODIUM 0.1 MG/1
TABLET ORAL
Qty: 30 TABLET | Refills: 0 | Status: SHIPPED | OUTPATIENT
Start: 2017-12-14 | End: 2018-01-20 | Stop reason: SDUPTHER

## 2017-12-14 NOTE — PROGRESS NOTES
Physical Therapy Daily Progress Note    Subjective   Pain is very slight in (medial) knee. I can walk very well. I couldn't tell you when exactly it hurts.    Objective   See Exercise, Manual, and Modality Logs for complete treatment.       Assessment/Plan  Excellent tolerance to therapeutic exercise w/o c/o pain. Required verbal cuing x1 to avoid knee valgus. Plan to DC to HEP at next visit.               Manual Therapy:    0     mins  76849;  Therapeutic Exercise:    35     mins  51615;     Neuromuscular Homa:    0    mins  26396;    Therapeutic Activity:     0     mins  90126;     Gait Trainin     mins  06101;     Ultrasound:     0     mins  17129;    Work Hardening           0      mins 04766  Iontophoresis               0   mins 48320    Timed Treatment:   35   mins   Total Treatment:     40   mins    Jillian George, PT  Physical Therapist

## 2017-12-19 ENCOUNTER — TREATMENT (OUTPATIENT)
Dept: PHYSICAL THERAPY | Facility: CLINIC | Age: 77
End: 2017-12-19

## 2017-12-19 DIAGNOSIS — R26.89 BALANCE PROBLEM: ICD-10-CM

## 2017-12-19 DIAGNOSIS — M25.561 CHRONIC PAIN OF RIGHT KNEE: Primary | ICD-10-CM

## 2017-12-19 DIAGNOSIS — G89.29 CHRONIC PAIN OF RIGHT KNEE: Primary | ICD-10-CM

## 2017-12-19 PROCEDURE — 97110 THERAPEUTIC EXERCISES: CPT | Performed by: PHYSICAL THERAPIST

## 2017-12-19 NOTE — PROGRESS NOTES
Physical Therapy Daily Progress Note    Subjective   Pt reports she is feeling well, and she is ready to discharge. Would like new copy of HEP.    Objective   See Exercise, Manual, and Modality Logs for complete treatment.       Assessment/Plan  Pt is pain-free. Demonstrated excellent strength with squats and was able to squat to parallel. No significant limitations on loss of balance during ambulation or stairs. Reviewed HEP (required minimal cuing to prevent knee valgus). Plan to DC at this time.             Manual Therapy:    0     mins  33372;  Therapeutic Exercise:    40     mins  75578;     Neuromuscular Homa:    0    mins  32527;    Therapeutic Activity:     0     mins  24934;     Gait Trainin     mins  92750;     Ultrasound:     0     mins  48040;    Work Hardening           0      mins 00371  Iontophoresis               0   mins 41228    Timed Treatment:   40   mins   Total Treatment:     50   mins    Jillian George, PT  Physical Therapist

## 2017-12-19 NOTE — PROGRESS NOTES
/17Discharge Summary  Discharge Summary from Physical Therapy Report      Dates  PT visit: 10/02/17-12/19/17  Number of Visits: 18     Discharge Status of Patient: See Note dated 12/19/17    Goals: All Met    Discharge Plan: Continue with current home exercise program as instructed    Comments N/A    Date of Discharge 12/19/17        Jillian George, PT  Physical Therapist

## 2017-12-20 ENCOUNTER — TELEPHONE (OUTPATIENT)
Dept: SPORTS MEDICINE | Facility: CLINIC | Age: 77
End: 2017-12-20

## 2017-12-20 DIAGNOSIS — Z12.39 SCREENING FOR BREAST CANCER: Primary | ICD-10-CM

## 2017-12-20 RX ORDER — VALACYCLOVIR HYDROCHLORIDE 1 G/1
TABLET, FILM COATED ORAL
Qty: 4 TABLET | Refills: 1 | Status: SHIPPED | OUTPATIENT
Start: 2017-12-20 | End: 2018-08-29

## 2017-12-20 NOTE — TELEPHONE ENCOUNTER
Pt called and sts that she needs a order for Mammogram and can something be sent in for fever blisters on her lips.

## 2018-01-22 RX ORDER — LEVOTHYROXINE SODIUM 0.1 MG/1
TABLET ORAL
Qty: 30 TABLET | Refills: 0 | Status: SHIPPED | OUTPATIENT
Start: 2018-01-22 | End: 2018-10-23 | Stop reason: SDUPTHER

## 2018-03-08 ENCOUNTER — OFFICE VISIT (OUTPATIENT)
Dept: SPORTS MEDICINE | Facility: CLINIC | Age: 78
End: 2018-03-08

## 2018-03-08 VITALS
HEIGHT: 64 IN | SYSTOLIC BLOOD PRESSURE: 110 MMHG | WEIGHT: 132 LBS | DIASTOLIC BLOOD PRESSURE: 64 MMHG | BODY MASS INDEX: 22.53 KG/M2

## 2018-03-08 DIAGNOSIS — R25.2 MUSCLE CRAMPING: Primary | ICD-10-CM

## 2018-03-08 PROCEDURE — 99213 OFFICE O/P EST LOW 20 MIN: CPT | Performed by: FAMILY MEDICINE

## 2018-03-09 LAB
BUN SERPL-MCNC: 18 MG/DL (ref 8–23)
BUN/CREAT SERPL: 36.7 (ref 7–25)
CALCIUM SERPL-MCNC: 9.7 MG/DL (ref 8.6–10.5)
CHLORIDE SERPL-SCNC: 104 MMOL/L (ref 98–107)
CK SERPL-CCNC: 90 U/L (ref 20–180)
CO2 SERPL-SCNC: 27.4 MMOL/L (ref 22–29)
CREAT SERPL-MCNC: 0.49 MG/DL (ref 0.57–1)
GFR SERPLBLD CREATININE-BSD FMLA CKD-EPI: 122 ML/MIN/1.73
GFR SERPLBLD CREATININE-BSD FMLA CKD-EPI: 148 ML/MIN/1.73
GLUCOSE SERPL-MCNC: 126 MG/DL (ref 65–99)
MAGNESIUM SERPL-MCNC: 2 MG/DL (ref 1.6–2.4)
POTASSIUM SERPL-SCNC: 4 MMOL/L (ref 3.5–5.2)
SODIUM SERPL-SCNC: 144 MMOL/L (ref 136–145)

## 2018-03-12 ENCOUNTER — TELEPHONE (OUTPATIENT)
Dept: SPORTS MEDICINE | Facility: CLINIC | Age: 78
End: 2018-03-12

## 2018-03-15 NOTE — PROGRESS NOTES
"Jael is a 77 y.o. year old female    Chief Complaint   Patient presents with   • Leg Pain     bi-lat leg cramps   • Fatigue       History of Present Illness   HPI   Frequent but intermittent muscle cramps in legs, worsening over the past week. Worse with stress b/c mother just  and she hasn't been eating or drinking much, nor sleeping. Cramping pain in both legs, worse in lower legs.     I have reviewed the patient's medical, family, and social history in detail and updated the computerized patient record.    Review of Systems   Constitutional: Negative.    Respiratory: Negative.    Cardiovascular: Negative.        /64   Ht 162.6 cm (64\")   Wt 59.9 kg (132 lb)   BMI 22.66 kg/m²      Physical Exam   Constitutional: She appears well-developed and well-nourished.   Musculoskeletal: Normal range of motion. She exhibits no edema, tenderness or deformity.   Psychiatric:   Tearful, appropriate   Vitals reviewed.       Diagnoses and all orders for this visit:    Muscle cramping  -     Basic Metabolic Panel  -     Magnesium  -     CK       Increase hydration and nutrition intake, consider meal replacement shake for now since she doesn't have much appetite due to her mother's recent death.   "

## 2018-03-29 ENCOUNTER — TELEPHONE (OUTPATIENT)
Dept: SPORTS MEDICINE | Facility: CLINIC | Age: 78
End: 2018-03-29

## 2018-03-30 RX ORDER — LEVOTHYROXINE SODIUM 100 MCG
100 TABLET ORAL DAILY
Qty: 30 TABLET | Refills: 2 | Status: SHIPPED | OUTPATIENT
Start: 2018-03-30 | End: 2018-05-22 | Stop reason: SDUPTHER

## 2018-04-25 NOTE — PROGRESS NOTES
Chief Complaint   Patient presents with   • Diarrhea   • Abdominal Pain     Jael Navarro is a 76 y.o. female who presents with a 3 of recurrent left lower quadrant pain   HPI     Patient 76-year-old female with history of hypertension and irritable bowel presenting with recurrent left lower quadrant pain.  Patient denies nausea vomiting no melena or bright red blood per rectum.  Patient reports symptoms not particularly related to eating or drinking not related to bowel movements or lack of bowel movements.  Patient denies any change in her normal pattern of bowel movements as well.  Patient's last colonoscopy approximately 6-7 years ago.    Past Medical History   Diagnosis Date   • Allergic rhinitis    • Degeneration, intervertebral disc, thoracic    • Fracture of ankle    • Gingival and periodontal disease    • Hypertension    • Irritable bowel syndrome    • Knee fracture    • Wrist fracture        Current Outpatient Prescriptions:   •  aspirin 81 MG EC tablet, Take 1 tablet by mouth nightly., Disp: , Rfl:   •  levothyroxine (SYNTHROID, LEVOTHROID) 100 MCG tablet, TAKE ONE TABLET BY MOUTH ONCE DAILY, Disp: 30 tablet, Rfl: 2  •  fexofenadine (ALLEGRA) 180 MG tablet, Take by mouth., Disp: , Rfl:   •  Influenza Vac Typ A&B Surf Ant (FLUVIRIN) suspension, Apply  to cheek., Disp: , Rfl:   •  Influenza Vac Typ A&B Surf Ant injection, Apply to cheek., Disp: , Rfl:   •  Multiple Vitamin (MULTI-VITAMIN) tablet, Take by mouth., Disp: , Rfl:   •  vitamin D (ERGOCALCIFEROL) 64721 UNITS capsule capsule, Take 1 capsule by mouth once a week., Disp: , Rfl:   Allergies   Allergen Reactions   • Iodinated Diagnostic Agents Anaphylaxis   • Iodine    • Sulfa Antibiotics Rash     Social History     Social History   • Marital status:      Spouse name: N/A   • Number of children: N/A   • Years of education: N/A     Occupational History   • Not on file.     Social History Main Topics   • Smoking status: Never Smoker   •  Smokeless tobacco: Not on file   • Alcohol use No   • Drug use: No   • Sexual activity: Not on file     Other Topics Concern   • Not on file     Social History Narrative     Family History   Problem Relation Age of Onset   • Heart attack Mother    • Alzheimer's disease Mother    • Glaucoma Mother    • Heart disease Mother    • Hypertension Mother    • Thyroid disease Mother    • Heart disease Father    • Alzheimer's disease Sister    • Anxiety disorder Daughter    • Bipolar disorder Daughter    • Depression Daughter    • Stroke Daughter    • Colon polyps Daughter    • Heart attack Maternal Grandmother    • Breast cancer Other    • Lung cancer Other    • Ovarian cancer Other    • Diabetes Other    • Heart disease Other    • Diabetes Cousin    • Emphysema Cousin    • Heart disease Cousin    • Multiple sclerosis Cousin    • Heart disease Other    • Nephrolithiasis Other      Review of Systems   Constitutional: Negative.    Eyes: Negative.    Respiratory: Negative.    Cardiovascular: Negative.    Gastrointestinal: Positive for abdominal pain.   Endocrine: Negative.    Musculoskeletal: Negative.    Skin: Negative.    Allergic/Immunologic: Negative.    Hematological: Negative.      Vitals:    03/02/17 1329   BP: 152/86     Physical Exam   Constitutional: She is oriented to person, place, and time. She appears well-developed and well-nourished.   HENT:   Head: Normocephalic and atraumatic.   Eyes: Pupils are equal, round, and reactive to light. No scleral icterus.   Neck: Normal range of motion. Neck supple.   Cardiovascular: Normal rate, regular rhythm and normal heart sounds.  Exam reveals no gallop and no friction rub.    No murmur heard.  Pulmonary/Chest: Effort normal and breath sounds normal. She has no wheezes. She has no rales.   Abdominal: Soft. Bowel sounds are normal. She exhibits no shifting dullness, no distension, no pulsatile liver, no fluid wave, no abdominal bruit, no ascites, no pulsatile midline mass and  no mass. There is no hepatosplenomegaly. There is no tenderness. There is no rigidity and no guarding. No hernia.   Musculoskeletal: Normal range of motion. She exhibits no edema.   Lymphadenopathy:     She has no cervical adenopathy.   Neurological: She is alert and oriented to person, place, and time. No cranial nerve deficit.   Skin: Skin is warm and dry.   Psychiatric: She has a normal mood and affect. Her behavior is normal. Thought content normal.   Nursing note and vitals reviewed.    Diagnoses and all orders for this visit:    Left lower quadrant pain  -     Case Request; Standing  -     Case Request    Other orders  -     Implement Anesthesia orders day of procedure.; Standing  -     Obtain informed consent; Standing     patient is a 76-year-old female with history of hypertension and IBS presenting with intermittent left lower quadrant pain.  Patient's last colonoscopy proximally 6-7 years ago now referred for further evaluation.  Patient reports no specific change in bowel habits tend to be regular.  Patient reports the pain comes and goes not necessarily related to bowel movements or lack of bowel movements not necessarily related to eating or not eating.  At this point this patient more than 6 years since last colonoscopy and left lower quadrant pain otherwise unexplained would proceed with colonoscopy if negative patient can return to every 10 year monitoring.  We'll follow-up clinically based on colonoscopy findings.   no abdominal pain, no bloating, no constipation, no diarrhea, no nausea and no vomiting.

## 2018-05-03 ENCOUNTER — OFFICE VISIT (OUTPATIENT)
Dept: SPORTS MEDICINE | Facility: CLINIC | Age: 78
End: 2018-05-03

## 2018-05-03 VITALS
BODY MASS INDEX: 22.71 KG/M2 | WEIGHT: 133 LBS | DIASTOLIC BLOOD PRESSURE: 72 MMHG | HEIGHT: 64 IN | SYSTOLIC BLOOD PRESSURE: 146 MMHG

## 2018-05-03 DIAGNOSIS — M79.10 MYALGIA: Primary | ICD-10-CM

## 2018-05-03 DIAGNOSIS — M48.02 CERVICAL SPINAL STENOSIS: ICD-10-CM

## 2018-05-03 DIAGNOSIS — M79.2 NEUROPATHIC PAIN: ICD-10-CM

## 2018-05-03 PROCEDURE — 72040 X-RAY EXAM NECK SPINE 2-3 VW: CPT | Performed by: FAMILY MEDICINE

## 2018-05-03 PROCEDURE — 72100 X-RAY EXAM L-S SPINE 2/3 VWS: CPT | Performed by: FAMILY MEDICINE

## 2018-05-03 PROCEDURE — 99214 OFFICE O/P EST MOD 30 MIN: CPT | Performed by: FAMILY MEDICINE
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
## 2018-05-05 LAB
ALBUMIN SERPL-MCNC: 4.7 G/DL (ref 3.5–5.2)
ALBUMIN/GLOB SERPL: 2 G/DL
ALP SERPL-CCNC: 85 U/L (ref 39–117)
ALT SERPL-CCNC: 16 U/L (ref 1–33)
AST SERPL-CCNC: 17 U/L (ref 1–32)
B BURGDOR IGM SER IA-ACNC: <0.8 INDEX (ref 0–0.79)
BASOPHILS # BLD AUTO: 0.01 10*3/MM3 (ref 0–0.2)
BASOPHILS NFR BLD AUTO: 0.2 % (ref 0–1.5)
BILIRUB SERPL-MCNC: 0.4 MG/DL (ref 0.1–1.2)
BUN SERPL-MCNC: 13 MG/DL (ref 8–23)
BUN/CREAT SERPL: 21.7 (ref 7–25)
CALCIUM SERPL-MCNC: 9.6 MG/DL (ref 8.6–10.5)
CCP IGA+IGG SERPL IA-ACNC: 3 UNITS (ref 0–19)
CENTROMERE B AB SER-ACNC: <0.2 AI (ref 0–0.9)
CHLORIDE SERPL-SCNC: 103 MMOL/L (ref 98–107)
CHROMATIN AB SERPL-ACNC: <0.2 AI (ref 0–0.9)
CO2 SERPL-SCNC: 28.6 MMOL/L (ref 22–29)
CREAT SERPL-MCNC: 0.6 MG/DL (ref 0.57–1)
CRP SERPL-MCNC: 0.12 MG/DL (ref 0–0.5)
DSDNA AB SER-ACNC: 1 IU/ML (ref 0–9)
ENA JO1 AB SER-ACNC: <0.2 AI (ref 0–0.9)
ENA RNP AB SER-ACNC: <0.2 AI (ref 0–0.9)
ENA SCL70 AB SER-ACNC: <0.2 AI (ref 0–0.9)
ENA SM AB SER-ACNC: <0.2 AI (ref 0–0.9)
ENA SM+RNP AB SER-ACNC: <0.2 AI (ref 0–0.9)
ENA SS-A AB SER-ACNC: <0.2 AI (ref 0–0.9)
ENA SS-B AB SER-ACNC: <0.2 AI (ref 0–0.9)
EOSINOPHIL # BLD AUTO: 0.08 10*3/MM3 (ref 0–0.7)
EOSINOPHIL NFR BLD AUTO: 1.5 % (ref 0.3–6.2)
ERYTHROCYTE [DISTWIDTH] IN BLOOD BY AUTOMATED COUNT: 12.4 % (ref 11.7–13)
ERYTHROCYTE [SEDIMENTATION RATE] IN BLOOD BY WESTERGREN METHOD: 42 MM/HR (ref 0–30)
GFR SERPLBLD CREATININE-BSD FMLA CKD-EPI: 117 ML/MIN/1.73
GFR SERPLBLD CREATININE-BSD FMLA CKD-EPI: 96 ML/MIN/1.73
GLOBULIN SER CALC-MCNC: 2.4 GM/DL
GLUCOSE SERPL-MCNC: 99 MG/DL (ref 65–99)
HCT VFR BLD AUTO: 39.6 % (ref 35.6–45.5)
HGB BLD-MCNC: 13.1 G/DL (ref 11.9–15.5)
IMM GRANULOCYTES # BLD: 0.01 10*3/MM3 (ref 0–0.03)
IMM GRANULOCYTES NFR BLD: 0.2 % (ref 0–0.5)
LYMPHOCYTES # BLD AUTO: 1.62 10*3/MM3 (ref 0.9–4.8)
LYMPHOCYTES NFR BLD AUTO: 29.7 % (ref 19.6–45.3)
Lab: NORMAL
MAGNESIUM SERPL-MCNC: 2.3 MG/DL (ref 1.6–2.4)
MCH RBC QN AUTO: 31.1 PG (ref 26.9–32)
MCHC RBC AUTO-ENTMCNC: 33.1 G/DL (ref 32.4–36.3)
MCV RBC AUTO: 94.1 FL (ref 80.5–98.2)
MONOCYTES # BLD AUTO: 0.4 10*3/MM3 (ref 0.2–1.2)
MONOCYTES NFR BLD AUTO: 7.3 % (ref 5–12)
NEUTROPHILS # BLD AUTO: 3.34 10*3/MM3 (ref 1.9–8.1)
NEUTROPHILS NFR BLD AUTO: 61.3 % (ref 42.7–76)
PLATELET # BLD AUTO: 282 10*3/MM3 (ref 140–500)
POTASSIUM SERPL-SCNC: 4.1 MMOL/L (ref 3.5–5.2)
PROT SERPL-MCNC: 7.1 G/DL (ref 6–8.5)
RBC # BLD AUTO: 4.21 10*6/MM3 (ref 3.9–5.2)
RHEUMATOID FACT SERPL-ACNC: <10 IU/ML (ref 0–13.9)
RIBOSOMAL P AB SER-ACNC: <0.2 AI (ref 0–0.9)
SODIUM SERPL-SCNC: 144 MMOL/L (ref 136–145)
VIT B12 SERPL-MCNC: 495 PG/ML (ref 211–946)
WBC # BLD AUTO: 5.45 10*3/MM3 (ref 4.5–10.7)

## 2018-05-10 ENCOUNTER — TELEPHONE (OUTPATIENT)
Dept: SPORTS MEDICINE | Facility: CLINIC | Age: 78
End: 2018-05-10

## 2018-05-10 RX ORDER — MELOXICAM 15 MG/1
15 TABLET ORAL DAILY
Qty: 30 TABLET | Refills: 2 | Status: SHIPPED | OUTPATIENT
Start: 2018-05-10 | End: 2018-08-29

## 2018-05-10 NOTE — TELEPHONE ENCOUNTER
Lm to call for results.    ----- Message from Caitlyn Alfaro MA sent at 5/10/2018  1:39 PM EDT -----      ----- Message -----  From: Мария Guzmán MA  Sent: 5/7/2018  11:10 AM  To: Caitlyn Alfaro MA    Please advise pt.

## 2018-05-22 RX ORDER — LEVOTHYROXINE SODIUM 100 MCG
TABLET ORAL
Qty: 30 TABLET | Refills: 2 | Status: SHIPPED | OUTPATIENT
Start: 2018-05-22 | End: 2018-08-29

## 2018-05-25 ENCOUNTER — TELEPHONE (OUTPATIENT)
Dept: SPORTS MEDICINE | Facility: CLINIC | Age: 78
End: 2018-05-25

## 2018-05-25 ENCOUNTER — OFFICE VISIT (OUTPATIENT)
Dept: SPORTS MEDICINE | Facility: CLINIC | Age: 78
End: 2018-05-25

## 2018-05-25 VITALS
HEIGHT: 63 IN | WEIGHT: 133 LBS | HEART RATE: 72 BPM | BODY MASS INDEX: 23.57 KG/M2 | OXYGEN SATURATION: 98 % | SYSTOLIC BLOOD PRESSURE: 114 MMHG | DIASTOLIC BLOOD PRESSURE: 68 MMHG

## 2018-05-25 DIAGNOSIS — G57.93 NEUROPATHIC PAIN OF BOTH FEET: ICD-10-CM

## 2018-05-25 DIAGNOSIS — R29.898 OTHER SYMPTOMS AND SIGNS INVOLVING THE MUSCULOSKELETAL SYSTEM: ICD-10-CM

## 2018-05-25 DIAGNOSIS — M48.02 CERVICAL SPINAL STENOSIS: Primary | ICD-10-CM

## 2018-05-25 PROCEDURE — 99213 OFFICE O/P EST LOW 20 MIN: CPT | Performed by: FAMILY MEDICINE

## 2018-05-25 NOTE — TELEPHONE ENCOUNTER
----- Message from Keaton Dao MD sent at 5/25/2018  2:30 PM EDT -----  MRI of the neck confirms worsening degenerative changes.  Recommend consultation with pain management.

## 2018-07-24 ENCOUNTER — TELEPHONE (OUTPATIENT)
Dept: SPORTS MEDICINE | Facility: CLINIC | Age: 78
End: 2018-07-24

## 2018-07-25 DIAGNOSIS — Z12.39 SCREENING FOR BREAST CANCER: Primary | ICD-10-CM

## 2018-08-03 ENCOUNTER — TELEPHONE (OUTPATIENT)
Dept: SPORTS MEDICINE | Facility: CLINIC | Age: 78
End: 2018-08-03

## 2018-08-03 NOTE — TELEPHONE ENCOUNTER
Pt notified of results and requested to schedule appt regarding continued LBP. Transferred to [facility] [facility] for scheduling.     ----- Message from Keaton Dao MD sent at 8/3/2018  8:47 AM EDT -----  Mammogram is normal

## 2018-08-29 ENCOUNTER — OFFICE VISIT (OUTPATIENT)
Dept: SPORTS MEDICINE | Facility: CLINIC | Age: 78
End: 2018-08-29

## 2018-08-29 VITALS
OXYGEN SATURATION: 97 % | DIASTOLIC BLOOD PRESSURE: 70 MMHG | TEMPERATURE: 98 F | WEIGHT: 132 LBS | SYSTOLIC BLOOD PRESSURE: 150 MMHG | BODY MASS INDEX: 23.39 KG/M2 | HEART RATE: 79 BPM | HEIGHT: 63 IN

## 2018-08-29 DIAGNOSIS — E55.9 VITAMIN D DEFICIENCY: ICD-10-CM

## 2018-08-29 DIAGNOSIS — M81.0 AGE-RELATED OSTEOPOROSIS WITHOUT CURRENT PATHOLOGICAL FRACTURE: ICD-10-CM

## 2018-08-29 DIAGNOSIS — R73.02 IMPAIRED GLUCOSE TOLERANCE: ICD-10-CM

## 2018-08-29 DIAGNOSIS — Z00.00 MEDICARE ANNUAL WELLNESS VISIT, SUBSEQUENT: Primary | ICD-10-CM

## 2018-08-29 DIAGNOSIS — E78.2 MIXED HYPERLIPIDEMIA: ICD-10-CM

## 2018-08-29 DIAGNOSIS — Z13.820 ENCOUNTER FOR IMAGING TO ASSESS OSTEOPOROSIS: ICD-10-CM

## 2018-08-29 DIAGNOSIS — E03.9 ACQUIRED HYPOTHYROIDISM: ICD-10-CM

## 2018-08-29 DIAGNOSIS — M35.01 SJOGREN'S SYNDROME WITH KERATOCONJUNCTIVITIS SICCA (HCC): ICD-10-CM

## 2018-08-29 DIAGNOSIS — R45.89 DEPRESSED MOOD: ICD-10-CM

## 2018-08-29 DIAGNOSIS — I10 ESSENTIAL HYPERTENSION: ICD-10-CM

## 2018-08-29 DIAGNOSIS — E61.1 IRON DEFICIENCY: ICD-10-CM

## 2018-08-29 DIAGNOSIS — M81.0 OSTEOPOROSIS OF LUMBAR SPINE: ICD-10-CM

## 2018-08-29 DIAGNOSIS — F41.9 ANXIETY: ICD-10-CM

## 2018-08-29 DIAGNOSIS — Z23 NEED FOR PNEUMOCOCCAL VACCINE: ICD-10-CM

## 2018-08-29 PROBLEM — H35.30 MACULAR DEGENERATION OF BOTH EYES: Status: ACTIVE | Noted: 2018-08-29

## 2018-08-29 PROCEDURE — G0439 PPPS, SUBSEQ VISIT: HCPCS | Performed by: FAMILY MEDICINE

## 2018-08-29 PROCEDURE — 99214 OFFICE O/P EST MOD 30 MIN: CPT | Performed by: FAMILY MEDICINE

## 2018-08-29 PROCEDURE — 96160 PT-FOCUSED HLTH RISK ASSMT: CPT | Performed by: FAMILY MEDICINE

## 2018-08-29 PROCEDURE — 90732 PPSV23 VACC 2 YRS+ SUBQ/IM: CPT | Performed by: FAMILY MEDICINE

## 2018-08-29 PROCEDURE — G0009 ADMIN PNEUMOCOCCAL VACCINE: HCPCS | Performed by: FAMILY MEDICINE

## 2018-08-29 RX ORDER — CARBOXYMETHYLCELLULOSE SODIUM 5 MG/ML
1 SOLUTION/ DROPS OPHTHALMIC 2 TIMES DAILY PRN
COMMUNITY
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
## 2018-08-30 LAB
25(OH)D3+25(OH)D2 SERPL-MCNC: 20.7 NG/ML (ref 30–100)
ALBUMIN SERPL-MCNC: 4.8 G/DL (ref 3.5–4.7)
ALBUMIN/GLOB SERPL: 1.8 {RATIO} (ref 1.2–2.2)
ALP SERPL-CCNC: 94 IU/L (ref 39–117)
ALT SERPL-CCNC: 18 IU/L (ref 0–32)
AST SERPL-CCNC: 20 IU/L (ref 0–40)
BASOPHILS # BLD AUTO: 0 X10E3/UL (ref 0–0.2)
BASOPHILS NFR BLD AUTO: 0 %
BILIRUB SERPL-MCNC: 0.4 MG/DL (ref 0–1.2)
BUN SERPL-MCNC: 14 MG/DL (ref 8–27)
BUN/CREAT SERPL: 23 (ref 12–28)
CALCIUM SERPL-MCNC: 10 MG/DL (ref 8.7–10.3)
CHLORIDE SERPL-SCNC: 101 MMOL/L (ref 96–106)
CHOLEST SERPL-MCNC: 259 MG/DL (ref 100–199)
CO2 SERPL-SCNC: 24 MMOL/L (ref 20–29)
CREAT SERPL-MCNC: 0.6 MG/DL (ref 0.57–1)
EOSINOPHIL # BLD AUTO: 0.1 X10E3/UL (ref 0–0.4)
EOSINOPHIL NFR BLD AUTO: 1 %
ERYTHROCYTE [DISTWIDTH] IN BLOOD BY AUTOMATED COUNT: 13.5 % (ref 12.3–15.4)
FERRITIN SERPL-MCNC: 123 NG/ML (ref 15–150)
GLOBULIN SER CALC-MCNC: 2.6 G/DL (ref 1.5–4.5)
GLUCOSE SERPL-MCNC: 102 MG/DL (ref 65–99)
GLUCOSE UR QL: NORMAL
HBA1C MFR BLD: 5.6 % (ref 4.8–5.6)
HCT VFR BLD AUTO: 40.6 % (ref 34–46.6)
HDLC SERPL-MCNC: 46 MG/DL
HGB BLD-MCNC: 13.2 G/DL (ref 11.1–15.9)
IMM GRANULOCYTES # BLD: 0 X10E3/UL (ref 0–0.1)
IMM GRANULOCYTES NFR BLD: 0 %
IRON SATN MFR SERPL: 16 % (ref 15–55)
IRON SERPL-MCNC: 57 UG/DL (ref 27–139)
KETONES UR QL STRIP: NORMAL
LDLC SERPL CALC-MCNC: 181 MG/DL (ref 0–99)
LYMPHOCYTES # BLD AUTO: 1.9 X10E3/UL (ref 0.7–3.1)
LYMPHOCYTES NFR BLD AUTO: 30 %
MAGNESIUM SERPL-MCNC: 2 MG/DL (ref 1.6–2.3)
MCH RBC QN AUTO: 30.1 PG (ref 26.6–33)
MCHC RBC AUTO-ENTMCNC: 32.5 G/DL (ref 31.5–35.7)
MCV RBC AUTO: 93 FL (ref 79–97)
MONOCYTES # BLD AUTO: 0.5 X10E3/UL (ref 0.1–0.9)
MONOCYTES NFR BLD AUTO: 8 %
NEUTROPHILS # BLD AUTO: 3.8 X10E3/UL (ref 1.4–7)
NEUTROPHILS NFR BLD AUTO: 61 %
PH UR STRIP: NORMAL [PH]
PLATELET # BLD AUTO: 254 X10E3/UL (ref 150–379)
POTASSIUM SERPL-SCNC: 4.4 MMOL/L (ref 3.5–5.2)
PROT SERPL-MCNC: 7.4 G/DL (ref 6–8.5)
PROT UR QL STRIP: NORMAL
RBC # BLD AUTO: 4.38 X10E6/UL (ref 3.77–5.28)
REQUEST PROBLEM: NORMAL
SODIUM SERPL-SCNC: 142 MMOL/L (ref 134–144)
SP GR UR: NORMAL
T4 FREE SERPL-MCNC: 0.95 NG/DL (ref 0.82–1.77)
TIBC SERPL-MCNC: 351 UG/DL (ref 250–450)
TRIGL SERPL-MCNC: 159 MG/DL (ref 0–149)
TSH SERPL DL<=0.005 MIU/L-ACNC: 11.39 UIU/ML (ref 0.45–4.5)
UIBC SERPL-MCNC: 294 UG/DL (ref 118–369)
UNABLE TO VOID: NORMAL
VLDLC SERPL CALC-MCNC: 32 MG/DL (ref 5–40)
WBC # BLD AUTO: 6.2 X10E3/UL (ref 3.4–10.8)

## 2018-08-31 ENCOUNTER — TELEPHONE (OUTPATIENT)
Dept: SPORTS MEDICINE | Facility: CLINIC | Age: 78
End: 2018-08-31

## 2018-08-31 RX ORDER — ERGOCALCIFEROL 1.25 MG/1
50000 CAPSULE ORAL WEEKLY
Qty: 6 CAPSULE | Refills: 1 | Status: SHIPPED | OUTPATIENT
Start: 2018-08-31 | End: 2018-11-17 | Stop reason: SDUPTHER

## 2018-08-31 RX ORDER — ATORVASTATIN CALCIUM 20 MG/1
20 TABLET, FILM COATED ORAL DAILY
Qty: 30 TABLET | Refills: 5 | Status: SHIPPED | OUTPATIENT
Start: 2018-08-31 | End: 2018-10-23 | Stop reason: SDDI

## 2018-09-06 ENCOUNTER — TELEPHONE (OUTPATIENT)
Dept: SPORTS MEDICINE | Facility: CLINIC | Age: 78
End: 2018-09-06

## 2018-09-06 NOTE — TELEPHONE ENCOUNTER
dexa scan is needing a new order with different diagnosis to get it covered. Before appointment tomorrow

## 2018-09-07 ENCOUNTER — HOSPITAL ENCOUNTER (OUTPATIENT)
Dept: BONE DENSITY | Facility: HOSPITAL | Age: 78
Discharge: HOME | End: 2018-09-07
Admitting: FAMILY MEDICINE

## 2018-09-07 DIAGNOSIS — M81.0 OSTEOPOROSIS OF LUMBAR SPINE: ICD-10-CM

## 2018-09-07 DIAGNOSIS — Z00.00 MEDICARE ANNUAL WELLNESS VISIT, SUBSEQUENT: ICD-10-CM

## 2018-09-07 DIAGNOSIS — M81.0 AGE-RELATED OSTEOPOROSIS WITHOUT CURRENT PATHOLOGICAL FRACTURE: ICD-10-CM

## 2018-09-07 DIAGNOSIS — Z13.820 ENCOUNTER FOR IMAGING TO ASSESS OSTEOPOROSIS: ICD-10-CM

## 2018-09-07 PROCEDURE — 77080 DXA BONE DENSITY AXIAL: CPT

## 2018-09-07 NOTE — TELEPHONE ENCOUNTER
I linked 2 additional diagnoses to her order. If not I'll need them to suggest one because I've used what makes the most sense

## 2018-09-13 DIAGNOSIS — N39.0 URINARY TRACT INFECTION WITHOUT HEMATURIA, SITE UNSPECIFIED: Primary | ICD-10-CM

## 2018-09-14 ENCOUNTER — TELEPHONE (OUTPATIENT)
Dept: SPORTS MEDICINE | Facility: CLINIC | Age: 78
End: 2018-09-14

## 2018-09-14 LAB
APPEARANCE UR: CLEAR
BACTERIA #/AREA URNS HPF: ABNORMAL /HPF
BILIRUB UR QL STRIP: NEGATIVE
CASTS URNS MICRO: ABNORMAL
CASTS URNS QL MICRO: PRESENT /LPF
COLOR UR: YELLOW
EPI CELLS #/AREA URNS HPF: ABNORMAL /HPF
GLUCOSE UR QL: NEGATIVE
HGB UR QL STRIP: NEGATIVE
KETONES UR QL STRIP: NEGATIVE
LEUKOCYTE ESTERASE UR QL STRIP: NEGATIVE
MICRO URNS: NORMAL
MICRO URNS: NORMAL
MUCOUS THREADS URNS QL MICRO: PRESENT /HPF
NITRITE UR QL STRIP: NEGATIVE
PH UR STRIP: 5 [PH] (ref 5–7.5)
PROT UR QL STRIP: NEGATIVE
RBC #/AREA URNS HPF: ABNORMAL /HPF
SP GR UR: 1.02 (ref 1–1.03)
URINALYSIS REFLEX: NORMAL
UROBILINOGEN UR STRIP-MCNC: 0.2 MG/DL (ref 0.2–1)
WBC #/AREA URNS HPF: ABNORMAL /HPF

## 2018-09-17 ENCOUNTER — TELEPHONE (OUTPATIENT)
Dept: SPORTS MEDICINE | Facility: CLINIC | Age: 78
End: 2018-09-17

## 2018-10-03 ENCOUNTER — TELEPHONE (OUTPATIENT)
Dept: SPORTS MEDICINE | Facility: CLINIC | Age: 78
End: 2018-10-03

## 2018-10-03 NOTE — TELEPHONE ENCOUNTER
She is having sweats throughout the day, sweating profusely enough to make her clothing wet. She is concerned

## 2018-10-10 ENCOUNTER — LAB (OUTPATIENT)
Dept: SPORTS MEDICINE | Facility: CLINIC | Age: 78
End: 2018-10-10

## 2018-10-10 DIAGNOSIS — E03.9 HYPOTHYROIDISM, UNSPECIFIED TYPE: Primary | ICD-10-CM

## 2018-10-11 LAB
T4 FREE SERPL-MCNC: 1.65 NG/DL (ref 0.93–1.7)
TSH SERPL DL<=0.005 MIU/L-ACNC: 0.18 MIU/ML (ref 0.27–4.2)

## 2018-10-23 ENCOUNTER — OFFICE VISIT (OUTPATIENT)
Dept: SPORTS MEDICINE | Facility: CLINIC | Age: 78
End: 2018-10-23

## 2018-10-23 VITALS
WEIGHT: 130 LBS | HEIGHT: 63 IN | SYSTOLIC BLOOD PRESSURE: 132 MMHG | DIASTOLIC BLOOD PRESSURE: 74 MMHG | BODY MASS INDEX: 23.04 KG/M2

## 2018-10-23 DIAGNOSIS — R45.89 DEPRESSED MOOD: ICD-10-CM

## 2018-10-23 DIAGNOSIS — E03.9 HYPOTHYROIDISM, UNSPECIFIED TYPE: Primary | ICD-10-CM

## 2018-10-23 DIAGNOSIS — M79.661 PAIN IN BOTH LOWER LEGS: ICD-10-CM

## 2018-10-23 DIAGNOSIS — F41.9 ANXIETY: ICD-10-CM

## 2018-10-23 DIAGNOSIS — M79.662 PAIN IN BOTH LOWER LEGS: ICD-10-CM

## 2018-10-23 DIAGNOSIS — R09.89 OTHER SPECIFIED SYMPTOMS AND SIGNS INVOLVING THE CIRCULATORY AND RESPIRATORY SYSTEMS: ICD-10-CM

## 2018-10-23 PROCEDURE — 99214 OFFICE O/P EST MOD 30 MIN: CPT | Performed by: FAMILY MEDICINE

## 2018-10-23 PROCEDURE — G0008 ADMIN INFLUENZA VIRUS VAC: HCPCS | Performed by: FAMILY MEDICINE

## 2018-10-23 PROCEDURE — 90662 IIV NO PRSV INCREASED AG IM: CPT | Performed by: FAMILY MEDICINE

## 2018-10-23 RX ORDER — LEVOTHYROXINE SODIUM 0.07 MG/1
75 TABLET ORAL DAILY
Qty: 30 TABLET | Refills: 1 | Status: SHIPPED | OUTPATIENT
Start: 2018-10-23 | End: 2018-12-19 | Stop reason: SDUPTHER

## 2018-10-23 NOTE — PROGRESS NOTES
"Jael is a 78 y.o. year old female    Chief Complaint   Patient presents with   • Follow-up     recent visit to Rheumatology   also thyroid    History of Present Illness   HPI   1. F/u thyroid dysfunction. Has been taking synthroid daily since her last visit. Incidentally notes she does feel a little better emotionally.     2. F/u psychology visit for depression/etc. Planning to start counseling and working on dealing with all the stressors on her plate.     3. Recent rheum visit raised question of PAD - difficulty palpating pedal pulses. Does note some pain in the legs. Mostly below the knees, variable, worse with activity.   -Note also stopped statin due to concern about myalgias, thought less necessary at her age    I have reviewed the patient's medical, family, and social history in detail and updated the computerized patient record.    Review of Systems   Constitutional: Negative.    Respiratory: Negative.    Cardiovascular: Negative.        /74   Ht 160 cm (63\")   Wt 59 kg (130 lb)   BMI 23.03 kg/m²      Physical Exam   Constitutional: She appears well-developed and well-nourished.   HENT:   Mouth/Throat: Oropharynx is clear and moist.   Cardiovascular:   Pulses:       Dorsalis pedis pulses are 1+ on the right side, and 1+ on the left side.        Posterior tibial pulses are 1+ on the right side, and 1+ on the left side.   Pulmonary/Chest: Effort normal.   Musculoskeletal: She exhibits tenderness (nonspecific bilat lower legs).   Skin: Skin is warm and dry.   Psychiatric: She has a normal mood and affect.   Vitals reviewed.       Diagnoses and all orders for this visit:    Hypothyroidism, unspecified type  -     levothyroxine (SYNTHROID, LEVOTHROID) 75 MCG tablet; Take 1 tablet by mouth Daily.    Pain in both lower legs  -     Doppler Ankle Brachial Index Multi Level CAR; Future    Anxiety    Depressed mood    Other specified symptoms and signs involving the circulatory and respiratory systems   -    "  Doppler Ankle Brachial Index Multi Level CAR; Future       Adjust thyroid replacement, recheck in 4-6 weeks.   Recheck DANDRE (normal with screening in 8/2017).  Keep plan to f/u with psychology.    EMR Dragon/Transcription disclaimer:    Much of this encounter note is an electronic transcription/translation of spoken language to printed text.  The electronic translation of spoken language may permit erroneous, or at times, nonsensical words or phrases to be inadvertently transcribed.  Although I have reviewed the note for such errors some may still exist.

## 2018-10-26 ENCOUNTER — TELEPHONE (OUTPATIENT)
Dept: SPORTS MEDICINE | Facility: CLINIC | Age: 78
End: 2018-10-26

## 2018-10-29 ENCOUNTER — HOSPITAL ENCOUNTER (OUTPATIENT)
Dept: CARDIOLOGY | Facility: HOSPITAL | Age: 78
Discharge: HOME | End: 2018-10-29
Admitting: FAMILY MEDICINE

## 2018-10-29 DIAGNOSIS — M79.661 PAIN IN BOTH LOWER LEGS: ICD-10-CM

## 2018-10-29 DIAGNOSIS — M79.662 PAIN IN BOTH LOWER LEGS: ICD-10-CM

## 2018-10-29 DIAGNOSIS — R09.89 OTHER SPECIFIED SYMPTOMS AND SIGNS INVOLVING THE CIRCULATORY AND RESPIRATORY SYSTEMS: ICD-10-CM

## 2018-10-29 DIAGNOSIS — I73.9 PERIPHERAL ARTERIAL DISEASE (HCC): Primary | ICD-10-CM

## 2018-10-29 LAB
BH CV LOWER ARTERIAL LEFT ABI RATIO: 0.42
BH CV LOWER ARTERIAL LEFT DORSALIS PEDIS SYS MAX: 44 MMHG
BH CV LOWER ARTERIAL LEFT GREAT TOE SYS MAX: 63 MMHG
BH CV LOWER ARTERIAL LEFT HIGH THIGH SYS MAX: 150 MMHG
BH CV LOWER ARTERIAL LEFT LOW THIGH SYS MAX: 114 MMHG
BH CV LOWER ARTERIAL LEFT POPLITEAL SYS MAX: 66 MMHG
BH CV LOWER ARTERIAL LEFT POST TIBIAL SYS MAX: 65 MMHG
BH CV LOWER ARTERIAL LEFT TBI RATIO: 0.42
BH CV LOWER ARTERIAL RIGHT ABI RATIO: 0.57
BH CV LOWER ARTERIAL RIGHT DORSALIS PEDIS SYS MAX: 83 MMHG
BH CV LOWER ARTERIAL RIGHT GREAT TOE SYS MAX: 87 MMHG
BH CV LOWER ARTERIAL RIGHT HIGH THIGH SYS MAX: 192 MMHG
BH CV LOWER ARTERIAL RIGHT LOW THIGH SYS MAX: 130 MMHG
BH CV LOWER ARTERIAL RIGHT POPLITEAL SYS MAX: 92 MMHG
BH CV LOWER ARTERIAL RIGHT POST TIBIAL SYS MAX: 88 MMHG
BH CV LOWER ARTERIAL RIGHT TBI RATIO: 0.57
UPPER ARTERIAL LEFT ARM BRACHIAL SYS MAX: 155 MMHG
UPPER ARTERIAL RIGHT ARM BRACHIAL SYS MAX: 153 MMHG

## 2018-10-29 PROCEDURE — 93923 UPR/LXTR ART STDY 3+ LVLS: CPT

## 2018-10-29 NOTE — PROGRESS NOTES
Bilateral lower venous doppler complete and preliminary is positive for severe lt leg arterial occlusive disease and moderate in the right leg to [place]

## 2018-10-30 ENCOUNTER — TELEPHONE (OUTPATIENT)
Dept: SPORTS MEDICINE | Facility: CLINIC | Age: 78
End: 2018-10-30

## 2018-10-30 NOTE — TELEPHONE ENCOUNTER
Left vmail for pt to return call.     ----- Message from Keaton Dao MD sent at 10/29/2018  4:49 PM EDT -----  Testing confirms abnormal blood flow to the legs. Referral order placed to vascular surgery.

## 2018-10-31 ENCOUNTER — TELEPHONE (OUTPATIENT)
Dept: SPORTS MEDICINE | Facility: CLINIC | Age: 78
End: 2018-10-31

## 2018-10-31 RX ORDER — CILOSTAZOL 50 MG/1
50 TABLET ORAL
Qty: 60 TABLET | Refills: 1 | Status: SHIPPED | OUTPATIENT
Start: 2018-10-31 | End: 2019-01-26 | Stop reason: SDUPTHER

## 2018-10-31 NOTE — TELEPHONE ENCOUNTER
I sent in Cilostazol (Pletal) to take twice a day to improve blood flow to her legs. This should be taken every day on schedule, not as needed.   Please also see if we can expedite her vascular consult.

## 2018-10-31 NOTE — TELEPHONE ENCOUNTER
Mrs. Navarro called this morning requesting a stronger pain medication for severe left leg pain. Not a narcotic. Please advise, thank you.

## 2018-11-19 RX ORDER — ERGOCALCIFEROL 1.25 MG/1
CAPSULE ORAL
Qty: 6 CAPSULE | Refills: 1 | Status: SHIPPED | OUTPATIENT
Start: 2018-11-19 | End: 2019-03-18 | Stop reason: SDUPTHER

## 2018-11-20 DIAGNOSIS — E03.9 ACQUIRED HYPOTHYROIDISM: Primary | ICD-10-CM

## 2018-11-28 ENCOUNTER — CLINICAL SUPPORT (OUTPATIENT)
Dept: SPORTS MEDICINE | Facility: CLINIC | Age: 78
End: 2018-11-28

## 2018-11-28 VITALS — DIASTOLIC BLOOD PRESSURE: 70 MMHG | SYSTOLIC BLOOD PRESSURE: 160 MMHG

## 2018-11-29 LAB
T4 FREE SERPL-MCNC: 1.33 NG/DL (ref 0.93–1.7)
TSH SERPL DL<=0.005 MIU/L-ACNC: 1.74 MIU/ML (ref 0.27–4.2)

## 2018-11-30 ENCOUNTER — TELEPHONE (OUTPATIENT)
Dept: SPORTS MEDICINE | Facility: CLINIC | Age: 78
End: 2018-11-30

## 2018-11-30 NOTE — TELEPHONE ENCOUNTER
Pt notified of results. She also stated that she has recently received jury duty notice. She doesn't think given her condition she would be able to serve, could you please write a note excusing her from jury duty? Thank you.

## 2018-12-03 ENCOUNTER — OFFICE VISIT (OUTPATIENT)
Dept: SPORTS MEDICINE | Facility: CLINIC | Age: 78
End: 2018-12-03

## 2018-12-03 VITALS
BODY MASS INDEX: 23.64 KG/M2 | SYSTOLIC BLOOD PRESSURE: 168 MMHG | WEIGHT: 133.4 LBS | DIASTOLIC BLOOD PRESSURE: 78 MMHG | HEART RATE: 90 BPM | OXYGEN SATURATION: 98 % | HEIGHT: 63 IN

## 2018-12-03 DIAGNOSIS — I73.9 PERIPHERAL ARTERIAL DISEASE (HCC): ICD-10-CM

## 2018-12-03 DIAGNOSIS — E03.9 ACQUIRED HYPOTHYROIDISM: Primary | ICD-10-CM

## 2018-12-03 DIAGNOSIS — F41.9 ANXIETY: ICD-10-CM

## 2018-12-03 DIAGNOSIS — R45.89 DEPRESSED MOOD: ICD-10-CM

## 2018-12-03 PROBLEM — N30.00 ACUTE CYSTITIS: Status: RESOLVED | Noted: 2017-12-12 | Resolved: 2018-12-03

## 2018-12-03 PROBLEM — N30.00 ACUTE CYSTITIS WITHOUT HEMATURIA: Status: RESOLVED | Noted: 2017-12-12 | Resolved: 2018-12-03

## 2018-12-03 PROCEDURE — 99214 OFFICE O/P EST MOD 30 MIN: CPT | Performed by: FAMILY MEDICINE

## 2018-12-03 RX ORDER — CLINDAMYCIN PHOSPHATE 11.9 MG/ML
SOLUTION TOPICAL
COMMUNITY
Start: 2018-11-27 | End: 2019-03-01

## 2018-12-03 RX ORDER — GABAPENTIN 300 MG/1
CAPSULE ORAL
COMMUNITY
Start: 2018-12-01 | End: 2019-03-01

## 2018-12-03 NOTE — PROGRESS NOTES
"Jael is a 78 y.o. year old female    Chief Complaint   Patient presents with   • Follow-up     Labs,        History of Present Illness   HPI   Here to f/u on depression, hypothyroidism, PAD.    PAD somewhat improved with Pletal, has f/u with Dr. Darnell in 2 months. He added gabapentin as well at bedtime for pain. Has avoided statins for years due to prior leg pains associated.     Depression/stress has been stable overall, was worse with family issues at [facility]. She has not been able to follow up with counseling as recommended by Dr. Marquez.     Hypothyroidism - labs show improved control. Feels more stable with that. Still generalized, mild-moderate fatigue but notes improved focus.     Dermatology added antibiotic for hair loss thinking some folliculitis.         I have reviewed the patient's medical, family, and social history in detail and updated the computerized patient record.    Review of Systems   Constitutional: Negative.    Respiratory: Negative.    Cardiovascular: Negative.        /78 (Patient Position: Sitting, Cuff Size: Adult)   Pulse 90   Ht 160 cm (62.99\")   Wt 60.5 kg (133 lb 6.4 oz)   SpO2 98%   BMI 23.64 kg/m²      Physical Exam   Constitutional: She appears well-developed and well-nourished.   HENT:   Mouth/Throat: Oropharynx is clear and moist.   Eyes: Conjunctivae are normal. Pupils are equal, round, and reactive to light.   Cardiovascular: Normal heart sounds.   Pulmonary/Chest: Effort normal and breath sounds normal.   Psychiatric: Her speech is normal and behavior is normal. Judgment and thought content normal. Her mood appears anxious. Her affect is not inappropriate. Cognition and memory are normal. She exhibits a depressed mood.        Diagnoses and all orders for this visit:    Acquired hypothyroidism    Peripheral arterial disease (CMS/HCC)    Depressed mood    Anxiety    Other orders  -     gabapentin (NEURONTIN) 300 MG capsule;   -     clindamycin (CLEOCIN T) 1 % " external solution;          Encourage to consider statin due to PAD. She's cautious due to her past SE  Continue Pletal, aspirin, f/u with vascular    Recommend consider ssri/snri due to ongoing depression/anxiety as well as chronic pain - may be good candidate for Cymbalta with her corresponding conditions    Thyroid improved, monitor q3mos due to her falling out of control previously.     F/u 6 weeks to check on these.       EMR Dragon/Transcription disclaimer:    Much of this encounter note is an electronic transcription/translation of spoken language to printed text.  The electronic translation of spoken language may permit erroneous, or at times, nonsensical words or phrases to be inadvertently transcribed.  Although I have reviewed the note for such errors some may still exist.

## 2018-12-19 DIAGNOSIS — E03.9 HYPOTHYROIDISM, UNSPECIFIED TYPE: ICD-10-CM

## 2018-12-20 RX ORDER — LEVOTHYROXINE SODIUM 0.07 MG/1
TABLET ORAL
Qty: 30 TABLET | Refills: 1 | Status: SHIPPED | OUTPATIENT
Start: 2018-12-20 | End: 2019-02-15 | Stop reason: SDUPTHER

## 2018-12-21 ENCOUNTER — TELEPHONE (OUTPATIENT)
Dept: SPORTS MEDICINE | Facility: CLINIC | Age: 78
End: 2018-12-21

## 2018-12-21 NOTE — TELEPHONE ENCOUNTER
PT called in stating that she went to see a NP for a cold and was given an antibiotic for an URI. She stated that she now has diarrhea and wants to know what she can do or take to rid the diarrhea.     She stated that she has been dealing with this for x2 days. She also stated that she is keeping herself hydrated and stopped drinking coffee. She also stated that she is eating soft foods as well.     Please advise.

## 2019-01-29 RX ORDER — CILOSTAZOL 50 MG/1
TABLET ORAL
Qty: 60 TABLET | Refills: 1 | Status: SHIPPED | OUTPATIENT
Start: 2019-01-29 | End: 2019-04-06 | Stop reason: SDUPTHER

## 2019-02-15 DIAGNOSIS — E03.9 HYPOTHYROIDISM, UNSPECIFIED TYPE: ICD-10-CM

## 2019-02-18 DIAGNOSIS — E03.9 HYPOTHYROIDISM, UNSPECIFIED TYPE: ICD-10-CM

## 2019-02-18 RX ORDER — LEVOTHYROXINE SODIUM 0.07 MG/1
75 TABLET ORAL DAILY
Qty: 90 TABLET | Refills: 1 | Status: SHIPPED | OUTPATIENT
Start: 2019-02-18 | End: 2019-09-24 | Stop reason: SDUPTHER

## 2019-02-18 RX ORDER — LEVOTHYROXINE SODIUM 0.07 MG/1
TABLET ORAL
Qty: 30 TABLET | Refills: 1 | Status: SHIPPED | OUTPATIENT
Start: 2019-02-18 | End: 2019-02-18

## 2019-03-01 ENCOUNTER — OFFICE VISIT (OUTPATIENT)
Dept: SPORTS MEDICINE | Facility: CLINIC | Age: 79
End: 2019-03-01

## 2019-03-01 VITALS
DIASTOLIC BLOOD PRESSURE: 70 MMHG | BODY MASS INDEX: 23.92 KG/M2 | HEIGHT: 63 IN | SYSTOLIC BLOOD PRESSURE: 150 MMHG | WEIGHT: 135 LBS
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
## 2019-03-01 DIAGNOSIS — M25.532 LEFT WRIST PAIN: ICD-10-CM

## 2019-03-01 DIAGNOSIS — E03.9 ACQUIRED HYPOTHYROIDISM: ICD-10-CM

## 2019-03-01 DIAGNOSIS — I10 ESSENTIAL HYPERTENSION: ICD-10-CM

## 2019-03-01 PROCEDURE — 99214 OFFICE O/P EST MOD 30 MIN: CPT | Performed by: FAMILY MEDICINE

## 2019-03-01 PROCEDURE — 73110 X-RAY EXAM OF WRIST: CPT | Performed by: FAMILY MEDICINE

## 2019-03-01 RX ORDER — KETOCONAZOLE 20 MG/ML
SHAMPOO TOPICAL
COMMUNITY
Start: 2019-02-13 | End: 2019-03-01

## 2019-03-01 RX ORDER — AMLODIPINE BESYLATE 5 MG/1
5 TABLET ORAL DAILY
Qty: 30 TABLET | Refills: 0 | Status: SHIPPED | OUTPATIENT
Start: 2019-03-01 | End: 2019-04-30

## 2019-03-01 RX ORDER — AMOXICILLIN AND CLAVULANATE POTASSIUM 875; 125 MG/1; MG/1
TABLET, FILM COATED ORAL
COMMUNITY
Start: 2018-12-19 | End: 2019-03-01
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
## 2019-03-02 LAB
T4 FREE SERPL-MCNC: 1.19 NG/DL (ref 0.93–1.7)
TSH SERPL DL<=0.005 MIU/L-ACNC: 2.19 MIU/ML (ref 0.27–4.2)

## 2019-03-19 RX ORDER — ERGOCALCIFEROL 1.25 MG/1
CAPSULE ORAL
Qty: 6 CAPSULE | Refills: 1 | Status: SHIPPED | OUTPATIENT
Start: 2019-03-19 | End: 2019-05-31 | Stop reason: SDUPTHER

## 2019-04-02 ENCOUNTER — OFFICE VISIT (OUTPATIENT)
Dept: SPORTS MEDICINE | Facility: CLINIC | Age: 79
End: 2019-04-02

## 2019-04-02 VITALS
DIASTOLIC BLOOD PRESSURE: 70 MMHG | OXYGEN SATURATION: 98 % | HEART RATE: 71 BPM | SYSTOLIC BLOOD PRESSURE: 160 MMHG | BODY MASS INDEX: 23.92 KG/M2 | HEIGHT: 63 IN | WEIGHT: 135 LBS

## 2019-04-02 DIAGNOSIS — M25.532 LEFT WRIST PAIN: Primary | ICD-10-CM

## 2019-04-02 DIAGNOSIS — H81.10 BPPV (BENIGN PAROXYSMAL POSITIONAL VERTIGO), UNSPECIFIED LATERALITY: ICD-10-CM

## 2019-04-02 DIAGNOSIS — R42 DIZZINESS: ICD-10-CM

## 2019-04-02 DIAGNOSIS — I10 ESSENTIAL HYPERTENSION: ICD-10-CM

## 2019-04-02 DIAGNOSIS — I73.9 PERIPHERAL ARTERIAL DISEASE (HCC): ICD-10-CM

## 2019-04-02 PROCEDURE — 99214 OFFICE O/P EST MOD 30 MIN: CPT | Performed by: FAMILY MEDICINE

## 2019-04-02 NOTE — PROGRESS NOTES
"Jael is a 78 y.o. year old female    Chief Complaint   Patient presents with   • Follow-up     4 week f/u - HTN    • Wrist Pain     Left - x Reoccurring Problem        History of Present Illness   HPI   1. L foot neuropathic pain, wakes her up overnight.   2. F/u PAD - able to walk longer distances. Monitoring with Dr. Darnell, no major changes in her DANDRE. Continuing conservative care.   3. Also has fallen once, having trouble with balance. Seems worse when lying down.   4. Recently fell and injured L wrist. Neg xrays here with Dr. Denise 3/1. Persistent sharp pain, intermittent, worse with use but unclear which type of use. Localized to the wrist. No associated symptoms.   5. F/u HTN. Home checks better but variable - low 100s up to 150 systolic. Did not start amlodipine due to concern about poss SE.     I have reviewed the patient's medical, family, and social history in detail and updated the computerized patient record.    Review of Systems   Constitutional: Negative for fatigue and fever.   Cardiovascular: Positive for leg swelling.   Musculoskeletal: Positive for arthralgias, joint swelling and myalgias.   Neurological:        Neuropathic pain       /70   Pulse 71   Ht 160 cm (62.99\")   Wt 61.2 kg (135 lb)   SpO2 98%   BMI 23.92 kg/m²      Physical Exam   Constitutional: She is oriented to person, place, and time. She appears well-developed and well-nourished.   Neck: Normal range of motion.   Cardiovascular: Normal rate, regular rhythm and normal heart sounds.   Pulmonary/Chest: Effort normal and breath sounds normal.   Musculoskeletal:   Left wrist: Normal appearance.  Nonspecific tenderness palpation.  Normal range of motion with normal strength.   Neurological: She is alert and oriented to person, place, and time.   Profoundly positive [place]-Hallpike maneuver   Skin: Skin is warm and dry.   Psychiatric: She has a normal mood and affect.   Vitals reviewed.        Current Outpatient Medications: "   •  amLODIPine (NORVASC) 5 MG tablet, Take 1 tablet by mouth Daily., Disp: 30 tablet, Rfl: 0  •  aspirin 81 MG EC tablet, Take 1 tablet by mouth nightly., Disp: , Rfl:   •  carboxymethylcellulose (REFRESH PLUS) 0.5 % solution, 1 drop 2 (Two) Times a Day As Needed for Dry Eyes., Disp: , Rfl:   •  cilostazol (PLETAL) 50 MG tablet, TAKE 1 TABLET BY MOUTH TWICE DAILY BEFORE MEALS, Disp: 60 tablet, Rfl: 1  •  levothyroxine (SYNTHROID, LEVOTHROID) 75 MCG tablet, Take 1 tablet by mouth Daily., Disp: 90 tablet, Rfl: 1  •  Multiple Vitamin (MULTI-VITAMIN) tablet, Take by mouth., Disp: , Rfl:   •  vitamin D (ERGOCALCIFEROL) 14445 units capsule capsule, TAKE 1 CAPSULE BY MOUTH ONCE A WEEK, Disp: 6 capsule, Rfl: 1     Diagnoses and all orders for this visit:    Left wrist pain    Essential hypertension    Dizziness    Peripheral arterial disease (CMS/HCC)    BPPV (benign paroxysmal positional vertigo), unspecified laterality  -     Ambulatory Referral to Physical Therapy Evaluate and treat, Vestibular       Agrees to start 1/2 tab amlodipine for BP control; recheck in 4 weeks.  Continue follow-up with vascular surgery regarding her peripheral arterial disease.  Start the vestibular therapy for BPPV      EMR Dragon/Transcription disclaimer:    Much of this encounter note is an electronic transcription/translation of spoken language to printed text.  The electronic translation of spoken language may permit erroneous, or at times, nonsensical words or phrases to be inadvertently transcribed.  Although I have reviewed the note for such errors some may still exist.

## 2019-04-08 RX ORDER — CILOSTAZOL 50 MG/1
TABLET ORAL
Qty: 60 TABLET | Refills: 1 | Status: SHIPPED | OUTPATIENT
Start: 2019-04-08 | End: 2019-08-02 | Stop reason: SDUPTHER

## 2019-04-30 ENCOUNTER — OFFICE VISIT (OUTPATIENT)
Dept: SPORTS MEDICINE | Facility: CLINIC | Age: 79
End: 2019-04-30

## 2019-04-30 VITALS
WEIGHT: 133 LBS | BODY MASS INDEX: 23.57 KG/M2 | DIASTOLIC BLOOD PRESSURE: 64 MMHG | SYSTOLIC BLOOD PRESSURE: 110 MMHG | HEIGHT: 63 IN

## 2019-04-30 DIAGNOSIS — I10 ESSENTIAL HYPERTENSION: ICD-10-CM

## 2019-04-30 DIAGNOSIS — I73.9 PERIPHERAL ARTERIAL DISEASE (HCC): Primary | ICD-10-CM

## 2019-04-30 DIAGNOSIS — R42 DIZZINESS: ICD-10-CM

## 2019-04-30 DIAGNOSIS — H81.10 BPPV (BENIGN PAROXYSMAL POSITIONAL VERTIGO), UNSPECIFIED LATERALITY: ICD-10-CM

## 2019-04-30 PROCEDURE — 99214 OFFICE O/P EST MOD 30 MIN: CPT | Performed by: FAMILY MEDICINE

## 2019-04-30 RX ORDER — AMLODIPINE BESYLATE 5 MG/1
2.5 TABLET ORAL DAILY
Qty: 45 TABLET | Refills: 1 | Status: SHIPPED | OUTPATIENT
Start: 2019-04-30 | End: 2020-11-23

## 2019-04-30 NOTE — PROGRESS NOTES
"Jael is a 78 y.o. year old female follow up on a problem familiar to this examiner.     Chief Complaint   Patient presents with   • Dizziness     hasn't started PT but getting better        History of Present Illness   HPI   1. F/u chronic arterial HTN - improved since starting norvasc. Stable without symptoms. Readings remain variable but overall average is lower.     2. F/u generalized dizziness/bppv - improving since last visit 4 weeks ago, hasn't started vestibular PT yet.     3. PAD with bilat leg pain - improving in upper legs but feet remain symptomatic (better but not as good as desired).     I have reviewed the patient's medical, family, and social history in detail and updated the computerized patient record.    Review of Systems   Constitutional: Negative.    Respiratory: Negative.    Cardiovascular: Positive for leg swelling. Negative for chest pain and palpitations.   Neurological: Positive for dizziness.       /64 (BP Location: Left arm, Patient Position: Sitting, Cuff Size: Adult)   Ht 160 cm (62.99\")   Wt 60.3 kg (133 lb)   BMI 23.57 kg/m²      Physical Exam   Constitutional: She is oriented to person, place, and time. She appears well-developed and well-nourished.   Eyes: Conjunctivae are normal. Pupils are equal, round, and reactive to light.   Cardiovascular: Normal rate, regular rhythm and normal heart sounds.   Pulses:       Dorsalis pedis pulses are 1+ on the right side, and 1+ on the left side.        Posterior tibial pulses are 1+ on the right side, and 1+ on the left side.   Feet pulses present but weak. Cap refill 2-3 sec bilaterally.    Mild/trace peripheral edema   Pulmonary/Chest: Effort normal and breath sounds normal.   Neurological: She is alert and oriented to person, place, and time.   Psychiatric: She has a normal mood and affect. Her behavior is normal. Judgment and thought content normal.   Nursing note and vitals reviewed.        Current Outpatient Medications:   •  " amLODIPine (NORVASC) 5 MG tablet, Take 0.5 tablets by mouth Daily., Disp: 45 tablet, Rfl: 1  •  aspirin 81 MG EC tablet, Take 1 tablet by mouth nightly., Disp: , Rfl:   •  carboxymethylcellulose (REFRESH PLUS) 0.5 % solution, 1 drop 2 (Two) Times a Day As Needed for Dry Eyes., Disp: , Rfl:   •  cilostazol (PLETAL) 50 MG tablet, TAKE ONE TABLET BY MOUTH TWICE DAILY BEFORE MEALS, Disp: 60 tablet, Rfl: 1  •  levothyroxine (SYNTHROID, LEVOTHROID) 75 MCG tablet, Take 1 tablet by mouth Daily., Disp: 90 tablet, Rfl: 1  •  Multiple Vitamin (MULTI-VITAMIN) tablet, Take by mouth., Disp: , Rfl:   •  vitamin D (ERGOCALCIFEROL) 00142 units capsule capsule, TAKE 1 CAPSULE BY MOUTH ONCE A WEEK, Disp: 6 capsule, Rfl: 1     Diagnoses and all orders for this visit:    Peripheral arterial disease (CMS/HCC)    Essential hypertension  -     amLODIPine (NORVASC) 5 MG tablet; Take 0.5 tablets by mouth Daily.    Dizziness    BPPV (benign paroxysmal positional vertigo), unspecified laterality    HTN improving, continue norvasc at current dose  PAD improving, continue pletal and f/u with Dr. Darnell  BPPV improving; will monitor her progress with vestibular rehab         EMR Dragon/Transcription disclaimer:    Much of this encounter note is an electronic transcription/translation of spoken language to printed text.  The electronic translation of spoken language may permit erroneous, or at times, nonsensical words or phrases to be inadvertently transcribed.  Although I have reviewed the note for such errors some may still exist.

## 2019-05-13 ENCOUNTER — TREATMENT (OUTPATIENT)
Dept: PHYSICAL THERAPY | Facility: CLINIC | Age: 79
End: 2019-05-13

## 2019-05-13 DIAGNOSIS — R42 VERTIGO: Primary | ICD-10-CM

## 2019-05-13 DIAGNOSIS — R29.6 MULTIPLE FALLS: ICD-10-CM

## 2019-05-13 DIAGNOSIS — H81.12 BPPV (BENIGN PAROXYSMAL POSITIONAL VERTIGO), LEFT: ICD-10-CM

## 2019-05-13 PROCEDURE — 95992 CANALITH REPOSITIONING PROC: CPT | Performed by: PHYSICAL THERAPIST

## 2019-05-13 PROCEDURE — 97161 PT EVAL LOW COMPLEX 20 MIN: CPT | Performed by: PHYSICAL THERAPIST

## 2019-05-13 NOTE — PROGRESS NOTES
Physical Therapy Initial Evaluation-  Vestibular Therapy    Patient Name: Jael Navarro       Patient MRN: [id number]  : 1940  Physician:Keaton Dao MD  Date: 2019  Encounter Diagnoses   Name Primary?   • Vertigo Yes     Objective Testing:  Positional Testing  Positional Testing: With infrared goggles  Vertebrobasilar Artery Screen - Right: Negative  Vertebrobasilar Artery Screen - Left: Negative  [place]-Hallpike Right: Right-beating Nystagmus  [place]-Hallpike Left: Left-beating Nystagmus  Horizontal Roll Test Right: (NA)  Horizontal Roll Test Left: (NA)  DHI: 30/100, low perception of handicap    THERAPY ASSESSMENT: Patient is a 78 y.o. female. Patient presents to physical therapy due to complaints of episodes of dizziness, vertigo, loss of balance that started in 2019. May have started when she had B ear infections.  Signs and symptoms are consistent with L HC canalithiasis BPPV. I suspect she has AC or PC involvement as well. Treated with CRP today.  Patient is a good candidate for physical therapy to address the following:    Functional Limitations: Walking, Difficulty moving, Decreased ability to perform ADL's   Length of Therapy: 1 month   PT Frequency: PT 2x week   PT Interventions: Home Exercise Program(CRP)   Patient Agrees with Plan of Care: Yes    REHAB POTENTIAL: excellent            SHORT TERM GOALS: To be met in 2 weeks:  1. Patient is independent with HEP.  2. Patient will report at least 30% improvement in overall condition.  LONG TERM GOALS:To be met in 4 weeks:  1. Patient will report decreased disequilibrium/dizziness by at least 90%.  2. Patient will report no loss of balance with ADLs to demonstrate improved functional balance.  3. Patient denies dizziness with daily activity.  4. DHI score is less than 10.     Other Procedure CPT 71824 minutes 0    Timed Code Treatment: 0   Minutes     Total Treatment Time: 45      Minutes      PT SIGNATURE: Jennifer Cano, PT, DPT,  CHT   [place] license #[id number]  DATE TREATMENT INITIATED: 5/13/2019     Medicare Initial Certification  Certification Period: 8/11/2019  I certify that the therapy services are furnished while this patient is under my care.  The services outlined above are required by this patient, and will be reviewed every 90 days.     PHYSICIAN: Keaton Dao MD      DATE:     Please sign and return via fax to [phone].. Thank you, [facility].

## 2019-05-29 ENCOUNTER — TREATMENT (OUTPATIENT)
Dept: PHYSICAL THERAPY | Facility: CLINIC | Age: 79
End: 2019-05-29

## 2019-05-29 DIAGNOSIS — R42 VERTIGO: Primary | ICD-10-CM

## 2019-05-29 DIAGNOSIS — R29.6 MULTIPLE FALLS: ICD-10-CM

## 2019-05-29 DIAGNOSIS — R26.89 BALANCE PROBLEM: ICD-10-CM

## 2019-05-29 DIAGNOSIS — H81.12 BPPV (BENIGN PAROXYSMAL POSITIONAL VERTIGO), LEFT: ICD-10-CM

## 2019-05-29 PROCEDURE — 95992 CANALITH REPOSITIONING PROC: CPT | Performed by: PHYSICAL THERAPIST

## 2019-05-29 NOTE — PROGRESS NOTES
Vestibular Daily Progress Note    Visit#:2  Subjective   I am feeling better. Much less dizziness. I am not a wobbly.   Objective         Positional Testing  Positional Testing: With infrared goggles  [place]-Hallpike Right: Downbeat, right rotatory nystagmus(on the return to sitting. )  Horizontal Roll Test Right: Left-beating  Horizontal Roll Test Left: Right-beating          PROCEDURES AND MODALITIES:  Paraffin:    Moist Heat:    Ice:    E-Stim:    Ultrasound:    Ionto:   Traction:    See Exercise, Manual, and Modality Logs for complete treatment.   Other Procedure CPT 07808 minutes 0       Timed Code Treatment: 0 Minutes  Total Treatment Time: 30 Minutes    Assessment/Plan   Patient is reporting significant improvement in dizziness after first treatment. R AC was addressed today and HEP for L SUSHILA herrera was given to preform independently. She is making good progress towards LTGs.     Progress per Plan of Care    Jennifer Cano, PT, DPT, CHT  Physical Therapist

## 2019-05-31 ENCOUNTER — TREATMENT (OUTPATIENT)
Dept: PHYSICAL THERAPY | Facility: CLINIC | Age: 79
End: 2019-05-31

## 2019-05-31 DIAGNOSIS — R29.6 MULTIPLE FALLS: ICD-10-CM

## 2019-05-31 DIAGNOSIS — R26.89 BALANCE PROBLEM: ICD-10-CM

## 2019-05-31 DIAGNOSIS — H81.12 BPPV (BENIGN PAROXYSMAL POSITIONAL VERTIGO), LEFT: ICD-10-CM

## 2019-05-31 DIAGNOSIS — R42 VERTIGO: Primary | ICD-10-CM

## 2019-05-31 PROCEDURE — 95992 CANALITH REPOSITIONING PROC: CPT | Performed by: PHYSICAL THERAPIST

## 2019-05-31 NOTE — PROGRESS NOTES
Vestibular Daily Progress Note    Visit#:3  Subjective   I was wobbly for a few hours after last treatment. Then I felt fine. I did not try the home ex.   Objective         Positional Testing  Positional Testing: With infrared goggles  [place]-Hallpike Right: Left-beating Nystagmus(has upbeating R torsional nyst after sittin gup. )          PROCEDURES AND MODALITIES:  Paraffin:    Moist Heat:    Ice:    E-Stim:    Ultrasound:    Ionto:   Traction:    See Exercise, Manual, and Modality Logs for complete treatment.   Other Procedure CPT 16026 minutes 0       Timed Code Treatment: 0 Minutes  Total Treatment Time: 30 Minutes    Assessment/Plan   Attempted alternate treatment today in effort to clear R AC/PC. She is not having symptoms with daily activity and reports improved balance.     Progress per Plan of Care    Jennifer Cano, PT, DPT, CHT  Physical Therapist

## 2019-06-03 RX ORDER — ERGOCALCIFEROL 1.25 MG/1
CAPSULE ORAL
Qty: 6 CAPSULE | Refills: 1 | Status: SHIPPED | OUTPATIENT
Start: 2019-06-03 | End: 2019-09-24 | Stop reason: SDUPTHER

## 2019-06-05 RX ORDER — CILOSTAZOL 50 MG/1
TABLET ORAL
Qty: 60 TABLET | Refills: 1 | Status: SHIPPED | OUTPATIENT
Start: 2019-06-05 | End: 2019-06-27 | Stop reason: SDUPTHER

## 2019-06-27 ENCOUNTER — OFFICE VISIT (OUTPATIENT)
Dept: SPORTS MEDICINE | Facility: CLINIC | Age: 79
End: 2019-06-27

## 2019-06-27 VITALS
OXYGEN SATURATION: 97 % | DIASTOLIC BLOOD PRESSURE: 70 MMHG | HEART RATE: 94 BPM | BODY MASS INDEX: 23.21 KG/M2 | SYSTOLIC BLOOD PRESSURE: 144 MMHG | WEIGHT: 131 LBS | HEIGHT: 63 IN | TEMPERATURE: 98.1 F

## 2019-06-27 DIAGNOSIS — J30.2 SEASONAL ALLERGIC RHINITIS, UNSPECIFIED TRIGGER: Primary | ICD-10-CM

## 2019-06-27 PROCEDURE — 99213 OFFICE O/P EST LOW 20 MIN: CPT | Performed by: FAMILY MEDICINE

## 2019-06-27 RX ORDER — KETOCONAZOLE 20 MG/ML
SHAMPOO TOPICAL
Refills: 3 | COMMUNITY
Start: 2019-06-19 | End: 2021-10-22

## 2019-06-27 NOTE — PROGRESS NOTES
"Jael is a 78 y.o. year old female evaluation of a problem that is new to this examiner.    Chief Complaint   Patient presents with   • Earache     left ear - felt sharp pain once       History of Present Illness   HPI   Throat pain, hoarseness, L ear pressure and one episode of sharp pain - mild-moderate severity for about 3 weeks now.  Neg strep at [facility] 2 weeks ago. Notes her vertigo is improved. Mild constant dull age L ear.    I have reviewed the patient's medical, family, and social history in detail and updated the computerized patient record.    Review of Systems   Constitutional: Negative.    Respiratory: Negative.        /70   Pulse 94   Temp 98.1 °F (36.7 °C)   Ht 160 cm (62.99\")   Wt 59.4 kg (131 lb)   SpO2 97%   BMI 23.21 kg/m²      Physical Exam   Constitutional: She appears well-developed and well-nourished.   HENT:   Posterior oropharynx cobblestoning.  Left tympanic membrane retracted   Eyes: Conjunctivae are normal. Pupils are equal, round, and reactive to light.   Neck: Normal range of motion.   Pulmonary/Chest: Effort normal and breath sounds normal.   Lymphadenopathy:     She has no cervical adenopathy.   Psychiatric: She has a normal mood and affect.   Vitals reviewed.        Current Outpatient Medications:   •  amLODIPine (NORVASC) 5 MG tablet, Take 0.5 tablets by mouth Daily., Disp: 45 tablet, Rfl: 1  •  aspirin 81 MG EC tablet, Take 1 tablet by mouth nightly., Disp: , Rfl:   •  carboxymethylcellulose (REFRESH PLUS) 0.5 % solution, 1 drop 2 (Two) Times a Day As Needed for Dry Eyes., Disp: , Rfl:   •  cilostazol (PLETAL) 50 MG tablet, TAKE ONE TABLET BY MOUTH TWICE DAILY BEFORE MEALS, Disp: 60 tablet, Rfl: 1  •  ketoconazole (NIZORAL) 2 % shampoo, USE EVERY OTHER DAY LATHER LEAVE IN PLACE FOR 2 5 MINUTES AND THEN RINSE OFF WITH WATER, Disp: , Rfl: 3  •  levothyroxine (SYNTHROID, LEVOTHROID) 75 MCG tablet, Take 1 tablet by mouth Daily., Disp: 90 tablet, Rfl: 1  •  Multiple " Vitamin (MULTI-VITAMIN) tablet, Take by mouth., Disp: , Rfl:   •  neomycin-polymyxin-hydrocortisone (CORTISPORIN) 3.5-08036-6 otic solution, INSTILL 4 DROPS IN AFFECTED EAR(S) 4 TIMES DAILY FOR 7 10 DAYS, Disp: , Rfl: 0  •  vitamin D (ERGOCALCIFEROL) 74374 units capsule capsule, TAKE ONE CAPSULE BY MOUTH ONCE A WEEK, Disp: 6 capsule, Rfl: 1     Diagnoses and all orders for this visit:    Seasonal allergic rhinitis, unspecified trigger    Other orders  -     ketoconazole (NIZORAL) 2 % shampoo; USE EVERY OTHER DAY LATHER LEAVE IN PLACE FOR 2 5 MINUTES AND THEN RINSE OFF WITH WATER  -     neomycin-polymyxin-hydrocortisone (CORTISPORIN) 3.5-33812-9 otic solution; INSTILL 4 DROPS IN AFFECTED EAR(S) 4 TIMES DAILY FOR 7 10 DAYS    Recommend she resume her Nasacort and Allegra.  If not improving by next week we will add antibiotics for possible developing bacterial sinusitis        EMR Dragon/Transcription disclaimer:    Much of this encounter note is an electronic transcription/translation of spoken language to printed text.  The electronic translation of spoken language may permit erroneous, or at times, nonsensical words or phrases to be inadvertently transcribed.  Although I have reviewed the note for such errors some may still exist.

## 2019-07-22 DIAGNOSIS — Z12.31 SCREENING MAMMOGRAM, ENCOUNTER FOR: Primary | ICD-10-CM

## 2019-08-02 RX ORDER — CILOSTAZOL 50 MG/1
TABLET ORAL
Qty: 60 TABLET | Refills: 1 | Status: SHIPPED | OUTPATIENT
Start: 2019-08-02 | End: 2019-11-04 | Stop reason: SDUPTHER

## 2019-08-12 ENCOUNTER — TELEPHONE (OUTPATIENT)
Dept: SPORTS MEDICINE | Facility: CLINIC | Age: 79
End: 2019-08-12

## 2019-08-12 NOTE — TELEPHONE ENCOUNTER
Pt states she is wanting her mammogram results. Performed at [facility] results in care everywhere tab. JJ

## 2019-09-03 ENCOUNTER — OFFICE VISIT (OUTPATIENT)
Dept: SPORTS MEDICINE | Facility: CLINIC | Age: 79
End: 2019-09-03

## 2019-09-03 VITALS
OXYGEN SATURATION: 98 % | DIASTOLIC BLOOD PRESSURE: 74 MMHG | BODY MASS INDEX: 22.71 KG/M2 | WEIGHT: 133 LBS | HEIGHT: 64 IN | SYSTOLIC BLOOD PRESSURE: 128 MMHG | HEART RATE: 88 BPM

## 2019-09-03 DIAGNOSIS — I10 ESSENTIAL HYPERTENSION: ICD-10-CM

## 2019-09-03 DIAGNOSIS — R73.02 IMPAIRED GLUCOSE TOLERANCE: ICD-10-CM

## 2019-09-03 DIAGNOSIS — N39.46 MIXED STRESS AND URGE URINARY INCONTINENCE: ICD-10-CM

## 2019-09-03 DIAGNOSIS — Z00.00 MEDICARE ANNUAL WELLNESS VISIT, SUBSEQUENT: Primary | ICD-10-CM

## 2019-09-03 DIAGNOSIS — E55.9 VITAMIN D DEFICIENCY: ICD-10-CM

## 2019-09-03 DIAGNOSIS — E03.9 ACQUIRED HYPOTHYROIDISM: ICD-10-CM

## 2019-09-03 DIAGNOSIS — E78.2 MIXED HYPERLIPIDEMIA: ICD-10-CM

## 2019-09-03 DIAGNOSIS — J30.1 SEASONAL ALLERGIC RHINITIS DUE TO POLLEN: ICD-10-CM

## 2019-09-03 DIAGNOSIS — I73.9 PERIPHERAL ARTERIAL DISEASE (HCC): ICD-10-CM

## 2019-09-03 DIAGNOSIS — R07.89 ATYPICAL CHEST PAIN: ICD-10-CM

## 2019-09-03 PROBLEM — R10.32 LLQ PAIN: Status: RESOLVED | Noted: 2017-03-29 | Resolved: 2019-09-03

## 2019-09-03 PROCEDURE — 99215 OFFICE O/P EST HI 40 MIN: CPT | Performed by: FAMILY MEDICINE

## 2019-09-03 PROCEDURE — 96160 PT-FOCUSED HLTH RISK ASSMT: CPT | Performed by: FAMILY MEDICINE

## 2019-09-03 PROCEDURE — 93000 ELECTROCARDIOGRAM COMPLETE: CPT | Performed by: FAMILY MEDICINE

## 2019-09-03 PROCEDURE — G0439 PPPS, SUBSEQ VISIT: HCPCS | Performed by: FAMILY MEDICINE

## 2019-09-03 NOTE — PROGRESS NOTES
Subsequent Medicare Wellness Visit   The ABC's of the Annual Wellness Visit    Chief Complaint   Patient presents with   • Medicare Wellness-subsequent       HPI:  Jael Navarro, -1940, is a 79 y.o. female who presents for a Subsequent Medicare Wellness Visit.    Recent Hospitalizations:  No hospitalization(s) within the last year..    Current Medical Providers:  Patient Care Team:  Keaton Dao MD as PCP - General  Keaton Dao MD as PCP - Family Medicine  Sylvia Darnell Jr., MD as Consulting Physician (Vascular Surgery)  Angela Keating MD as Consulting Physician (Obstetrics and Gynecology)    Health Habits and Functional and Cognitive Screening and Depression Screening:  Functional & Cognitive Status 9/3/2019   Do you have difficulty preparing food and eating? No   Do you have difficulty bathing yourself, getting dressed or grooming yourself? No   Do you have difficulty using the toilet? No   Do you have difficulty moving around from place to place? Yes   Do you have trouble with steps or getting out of a bed or a chair? Yes   Current Diet Low Carb Diet   Dental Exam Up to date   Eye Exam Up to date   Exercise (times per week) 7 times per week   Current Exercise Activities Include Walking   Do you need help using the phone?  No   Are you deaf or do you have serious difficulty hearing?  No   Do you need help with transportation? No   Do you need help shopping? No   Do you need help preparing meals?  No   Do you need help with housework?  Yes   Do you need help with laundry? No   Do you need help taking your medications? No   Do you need help managing money? No   Do you ever drive or ride in a car without wearing a seat belt? No   Have you felt unusual stress, anger or loneliness in the last month? -   Who do you live with? -   If you need help, do you have trouble finding someone available to you? -   Have you been bothered in the last four weeks by sexual problems? -   Do you  have difficulty concentrating, remembering or making decisions? Yes       Compared to one year ago, the patient feels her physical health is the same and her mental health is the same.    Depression Screen:  PHQ-2/PHQ-9 Depression Screening 9/3/2019   Little interest or pleasure in doing things 1   Feeling down, depressed, or hopeless 1   Trouble falling or staying asleep, or sleeping too much -   Feeling tired or having little energy -   Poor appetite or overeating -   Feeling bad about yourself - or that you are a failure or have let yourself or your family down -   Trouble concentrating on things, such as reading the newspaper or watching television -   Moving or speaking so slowly that other people could have noticed. Or the opposite - being so fidgety or restless that you have been moving around a lot more than usual -   Thoughts that you would be better off dead, or of hurting yourself in some way -   Total Score 2   If you checked off any problems, how difficult have these problems made it for you to do your work, take care of things at home, or get along with other people? -         Past Medical/Family/Social History:  The following portions of the patient's history were reviewed and updated as appropriate: allergies, current medications, past family history, past medical history, past social history, past surgical history and problem list.    Allergies   Allergen Reactions   • Iodinated Diagnostic Agents Anaphylaxis   • Other Anaphylaxis     CONTRAST DYE  CONTRAST DYE  CONTRAST DYE   • Iodine    • Sulfa Antibiotics Rash         Current Outpatient Medications:   •  amLODIPine (NORVASC) 5 MG tablet, Take 0.5 tablets by mouth Daily., Disp: 45 tablet, Rfl: 1  •  carboxymethylcellulose (REFRESH PLUS) 0.5 % solution, 1 drop 2 (Two) Times a Day As Needed for Dry Eyes., Disp: , Rfl:   •  cilostazol (PLETAL) 50 MG tablet, TAKE 1 TABLET BY MOUTH TWICE DAILY BEFORE MEAL(S), Disp: 60 tablet, Rfl: 1  •  ketoconazole  (NIZORAL) 2 % shampoo, USE EVERY OTHER DAY LATHER LEAVE IN PLACE FOR 2 5 MINUTES AND THEN RINSE OFF WITH WATER, Disp: , Rfl: 3  •  levothyroxine (SYNTHROID, LEVOTHROID) 75 MCG tablet, Take 1 tablet by mouth Daily., Disp: 90 tablet, Rfl: 1  •  Multiple Vitamin (MULTI-VITAMIN) tablet, Take by mouth., Disp: , Rfl:   •  vitamin D (ERGOCALCIFEROL) 79421 units capsule capsule, TAKE ONE CAPSULE BY MOUTH ONCE A WEEK, Disp: 6 capsule, Rfl: 1  •  aspirin 81 MG EC tablet, Take 1 tablet by mouth nightly., Disp: , Rfl:     Aspirin use counseling: Taking ASA appropriately as indicated    Current medication list contains no high risk medications.  No harmful drug interactions have been identified.     Family History   Problem Relation Age of Onset   • Heart attack Mother    • Alzheimer's disease Mother    • Glaucoma Mother    • Heart disease Mother    • Hypertension Mother    • Thyroid disease Mother    • Heart disease Father    • Alzheimer's disease Sister    • Anxiety disorder Daughter    • Bipolar disorder Daughter    • Depression Daughter    • Stroke Daughter    • Colon polyps Daughter    • Heart attack Maternal Grandmother    • Breast cancer Other    • Lung cancer Other    • Ovarian cancer Other    • Diabetes Other    • Heart disease Other    • Diabetes Cousin    • Emphysema Cousin    • Heart disease Cousin    • Multiple sclerosis Cousin    • Heart disease Other    • Nephrolithiasis Other        Social History     Tobacco Use   • Smoking status: Never Smoker   • Smokeless tobacco: Never Used   Substance Use Topics   • Alcohol use: No       Past Surgical History:   Procedure Laterality Date   • ABDOMINOPLASTY     • APPENDECTOMY     • CATARACT EXTRACTION     • COLONOSCOPY  unknown   • COLONOSCOPY N/A 3/29/2017    Procedure: COLONOSCOPY TO CECUM WITH COLD BIOPSIES;  Surgeon: Kyle Hidalgo MD;  Location: [facility] ENDOSCOPY;  Service:    • DILATATION AND CURETTAGE     • EXCISION BENIGN SKIN LESION SCALP / NECK / HANDS / FEET /  "GENITALIA      scalp-benign   • HAND SURGERY      mass between thumb and forefinger   • HYSTERECTOMY         Patient Active Problem List   Diagnosis   • Hypothyroidism   • Atopic rhinitis   • Allergy to dog dander   • Anxiety   • Allergy to cats   • Cataract   • Degeneration of intervertebral disc of cervical region   • Fibromyalgia   • Hyperlipidemia   • Essential hypertension   • Impaired glucose tolerance   • Bladder prolapse, female, acquired   • Rosacea   • Urinary incontinence   • Vitamin D deficiency   • Macular degeneration of both eyes   • Peripheral arterial disease (CMS/HCC)       Review of Systems   Constitutional: Positive for fatigue.   HENT: Negative.    Eyes: Negative.    Respiratory: Negative.    Cardiovascular: Positive for chest pain.   Gastrointestinal: Positive for abdominal pain.   Endocrine: Negative.    Genitourinary: Negative.    Musculoskeletal: Positive for myalgias.   Skin: Negative.    Allergic/Immunologic: Negative.    Neurological: Negative for syncope.   Hematological: Negative.    Psychiatric/Behavioral: Positive for decreased concentration.       Objective     Vitals:    09/03/19 1314   BP: 128/74   Pulse: 88   SpO2: 98%   Weight: 60.3 kg (133 lb)   Height: 162.6 cm (64.02\")       Patient's Body mass index is 22.82 kg/m². BMI is within normal parameters. No follow-up required..      No exam data present    The patient has no evidence of cognitve impairment.     Physical Exam   Constitutional: She is oriented to person, place, and time. She appears well-developed and well-nourished.   HENT:   Head: Normocephalic and atraumatic.   Right Ear: External ear normal.   Left Ear: External ear normal.   Mouth/Throat: Oropharynx is clear and moist.   Eyes: Conjunctivae are normal. Pupils are equal, round, and reactive to light.   Neck: Normal range of motion.   Cardiovascular: Normal rate, regular rhythm and normal heart sounds.   Pulmonary/Chest: Effort normal and breath sounds normal. "   Neurological: She is alert and oriented to person, place, and time.   Psychiatric: She has a normal mood and affect. Her behavior is normal. Judgment and thought content normal.   Vitals reviewed.      Recent Lab Results:  Lab Results   Component Value Date    GLU 88 09/03/2019     Lab Results   Component Value Date    TRIG 141 09/03/2019    HDL 50 09/03/2019    VLDL 28.2 09/03/2019       Assessment/Plan   Age-appropriate Screening Schedule:  Refer to the list below for future screening recommendations based on patient's age, sex and/or medical conditions.      Health Maintenance   Topic Date Due   • ZOSTER VACCINE (2 of 2) 02/26/2012   • INFLUENZA VACCINE  08/01/2019   • TDAP/TD VACCINES (2 - Td) 01/01/2020   • LIPID PANEL  09/03/2020   • DXA SCAN  09/07/2020   • PNEUMOCOCCAL VACCINES (65+ LOW/MEDIUM RISK)  Completed       Medicare Risks and Personalized Health Plan:  Breast Cancer/Mammogram Screening  Cardiovascular risk  Chronic Pain   Dementia/Memory   Depression/Dysphoria  Diabetic Lab Screening   Fall Risk  Immunizations Discussed/Encouraged (specific immunizations; Shingrix )  Inadequate Social Support, Isolation, Loneliness, Lack of Transportation, Financial Difficulties, or Caregiver Stress   Osteoprorosis Risk  Urinary Incontinence      CMS-Preventive Services Quick Reference  Medicare Preventive Services Addressed:  Annual Wellness Visit (AWV)  Diabetes Screening-Lab Order for either glucose quantitative blood (except reagent strip), glucose;post glucose dose(includes glucose), or glucose tolerance test-3 specimens(includes glucose)    Advance Care Planning:  Patient has an advance directive - a copy has not been provided. Have asked the patient to send this to us to add to record    Diagnoses and all orders for this visit:    1. Medicare annual wellness visit, subsequent (Primary)  -     CBC & Differential  -     Comprehensive Metabolic Panel  -     Hemoglobin A1c  -     Lipid Panel With / Chol / HDL  Ratio  -     Magnesium  -     TSH  -     T4, Free  -     Vitamin D 25 Hydroxy  -     Urinalysis With Culture If Indicated - Urine, Clean Catch  -     ECG 12 Lead    2. Essential hypertension  -     CBC & Differential  -     Comprehensive Metabolic Panel  -     Hemoglobin A1c  -     Lipid Panel With / Chol / HDL Ratio  -     Magnesium  -     TSH  -     T4, Free  -     Vitamin D 25 Hydroxy  -     Urinalysis With Culture If Indicated - Urine, Clean Catch  -     ECG 12 Lead    3. Mixed hyperlipidemia  -     CBC & Differential  -     Comprehensive Metabolic Panel  -     Hemoglobin A1c  -     Lipid Panel With / Chol / HDL Ratio  -     Magnesium  -     TSH  -     T4, Free  -     Vitamin D 25 Hydroxy  -     Urinalysis With Culture If Indicated - Urine, Clean Catch  -     ECG 12 Lead    4. Peripheral arterial disease (CMS/HCC)  -     CBC & Differential  -     Comprehensive Metabolic Panel  -     Hemoglobin A1c  -     Lipid Panel With / Chol / HDL Ratio  -     Magnesium  -     TSH  -     T4, Free  -     Vitamin D 25 Hydroxy  -     Urinalysis With Culture If Indicated - Urine, Clean Catch  -     ECG 12 Lead  -     Stress Test With Myocardial Perfusion - One Day; Future    5. Seasonal allergic rhinitis due to pollen  -     CBC & Differential  -     Comprehensive Metabolic Panel  -     Hemoglobin A1c  -     Lipid Panel With / Chol / HDL Ratio  -     Magnesium  -     TSH  -     T4, Free  -     Vitamin D 25 Hydroxy  -     Urinalysis With Culture If Indicated - Urine, Clean Catch    6. Vitamin D deficiency  -     CBC & Differential  -     Comprehensive Metabolic Panel  -     Hemoglobin A1c  -     Lipid Panel With / Chol / HDL Ratio  -     Magnesium  -     TSH  -     T4, Free  -     Vitamin D 25 Hydroxy  -     Urinalysis With Culture If Indicated - Urine, Clean Catch    7. Acquired hypothyroidism  -     CBC & Differential  -     Comprehensive Metabolic Panel  -     Hemoglobin A1c  -     Lipid Panel With / Chol / HDL Ratio  -      Magnesium  -     TSH  -     T4, Free  -     Vitamin D 25 Hydroxy  -     Urinalysis With Culture If Indicated - Urine, Clean Catch    8. Impaired glucose tolerance  -     CBC & Differential  -     Comprehensive Metabolic Panel  -     Hemoglobin A1c  -     Lipid Panel With / Chol / HDL Ratio  -     Magnesium  -     TSH  -     T4, Free  -     Vitamin D 25 Hydroxy  -     Urinalysis With Culture If Indicated - Urine, Clean Catch    9. Mixed stress and urge urinary incontinence  -     CBC & Differential  -     Comprehensive Metabolic Panel  -     Hemoglobin A1c  -     Lipid Panel With / Chol / HDL Ratio  -     Magnesium  -     TSH  -     T4, Free  -     Vitamin D 25 Hydroxy  -     Urinalysis With Culture If Indicated - Urine, Clean Catch    10. Atypical chest pain  -     ECG 12 Lead  -     Stress Test With Myocardial Perfusion - One Day; Future    Other orders  -     Microscopic Examination -        An After Visit Summary and PPPS with all of these plans were given to the patient.      Follow Up:  Return for After MRI or other testing.        CC: In addition to AWV also here to follow up on medical problems and new complaint of chest pain    HPI:  Recent diagnosis shingles left foot, still resolving    *New complaint, episode of substernal chest pain lasting 3-4 minutes, rad to jaw, mild, tight/burning, occurred while standing but not being physically active. Resolved with rest.     PAD - has f/u again with Dr. Darnell in October. Slightly improved flow per last test. Walking better.   Pre-diabetes/impaired glucose tolerance - no symptoms or changes  Anxiety/depression overall stable  Hypothyroid - feeling better on this dose  Chronic urinary incontinence - stable, discussing options with GYN  Macular degeneration - unchanged at last eye exam  Vit D def - stable on replacement.  HLD - stable, continues to decline statin.   Variable HTN - stable.   AR - using nasal spray, which is helping.       ECG 12 Lead  Date/Time:  9/3/2019 1:39 PM  Performed by: Keaton Dao MD  Authorized by: Keaton Dao MD   Previous ECG: no previous ECG available  Rhythm: sinus rhythm  Rate: normal  Conduction: conduction normal  ST Segments: ST segments normal  T Waves: T waves normal  QRS axis: normal  Other: no other findings    Clinical impression: normal ECG           A/P:  Non-exertional chest pain but with typical description: EKG reassuring, but given her long history of HLD on top of known PAD will arrange stress test. Will need pharmacologic due to PAD limitations, recommend nuclear stress due to ischemic equivalent PAD.   Regarding her other chronic complaints, we will continue current plan with medication and specialist follow-up.

## 2019-09-04 LAB
25(OH)D3+25(OH)D2 SERPL-MCNC: 42 NG/ML (ref 30–100)
ALBUMIN SERPL-MCNC: 4.5 G/DL (ref 3.5–5.2)
ALBUMIN/GLOB SERPL: 1.7 G/DL
ALP SERPL-CCNC: 93 U/L (ref 39–117)
ALT SERPL-CCNC: 20 U/L (ref 1–33)
APPEARANCE UR: CLEAR
AST SERPL-CCNC: 21 U/L (ref 1–32)
BACTERIA #/AREA URNS HPF: ABNORMAL /HPF
BASOPHILS # BLD AUTO: 0.03 10*3/MM3 (ref 0–0.2)
BASOPHILS NFR BLD AUTO: 0.5 % (ref 0–1.5)
BILIRUB SERPL-MCNC: 0.7 MG/DL (ref 0.2–1.2)
BILIRUB UR QL STRIP: NEGATIVE
BUN SERPL-MCNC: 14 MG/DL (ref 8–23)
BUN/CREAT SERPL: 19.7 (ref 7–25)
CALCIUM SERPL-MCNC: 9.7 MG/DL (ref 8.6–10.5)
CHLORIDE SERPL-SCNC: 101 MMOL/L (ref 98–107)
CHOLEST SERPL-MCNC: 242 MG/DL (ref 0–200)
CHOLEST/HDLC SERPL: 4.84 {RATIO}
CO2 SERPL-SCNC: 27.6 MMOL/L (ref 22–29)
COLOR UR: YELLOW
CREAT SERPL-MCNC: 0.71 MG/DL (ref 0.57–1)
CRYSTALS URNS MICRO: ABNORMAL
EOSINOPHIL # BLD AUTO: 0.07 10*3/MM3 (ref 0–0.4)
EOSINOPHIL NFR BLD AUTO: 1.1 % (ref 0.3–6.2)
EPI CELLS #/AREA URNS HPF: ABNORMAL /HPF
ERYTHROCYTE [DISTWIDTH] IN BLOOD BY AUTOMATED COUNT: 12.4 % (ref 12.3–15.4)
GLOBULIN SER CALC-MCNC: 2.7 GM/DL
GLUCOSE SERPL-MCNC: 88 MG/DL (ref 65–99)
GLUCOSE UR QL: NEGATIVE
HBA1C MFR BLD: 5.7 % (ref 4.8–5.6)
HCT VFR BLD AUTO: 39.8 % (ref 34–46.6)
HDLC SERPL-MCNC: 50 MG/DL (ref 40–60)
HGB BLD-MCNC: 13 G/DL (ref 12–15.9)
HGB UR QL STRIP: NEGATIVE
IMM GRANULOCYTES # BLD AUTO: 0.02 10*3/MM3 (ref 0–0.05)
IMM GRANULOCYTES NFR BLD AUTO: 0.3 % (ref 0–0.5)
KETONES UR QL STRIP: NEGATIVE
LDLC SERPL CALC-MCNC: 164 MG/DL (ref 0–100)
LEUKOCYTE ESTERASE UR QL STRIP: NEGATIVE
LYMPHOCYTES # BLD AUTO: 2.05 10*3/MM3 (ref 0.7–3.1)
LYMPHOCYTES NFR BLD AUTO: 31.4 % (ref 19.6–45.3)
MAGNESIUM SERPL-MCNC: 2.2 MG/DL (ref 1.6–2.4)
MCH RBC QN AUTO: 31 PG (ref 26.6–33)
MCHC RBC AUTO-ENTMCNC: 32.7 G/DL (ref 31.5–35.7)
MCV RBC AUTO: 95 FL (ref 79–97)
MICRO URNS: NORMAL
MICRO URNS: NORMAL
MONOCYTES # BLD AUTO: 0.6 10*3/MM3 (ref 0.1–0.9)
MONOCYTES NFR BLD AUTO: 9.2 % (ref 5–12)
MUCOUS THREADS URNS QL MICRO: PRESENT /HPF
NEUTROPHILS # BLD AUTO: 3.76 10*3/MM3 (ref 1.7–7)
NEUTROPHILS NFR BLD AUTO: 57.5 % (ref 42.7–76)
NITRITE UR QL STRIP: NEGATIVE
NRBC BLD AUTO-RTO: 0 /100 WBC (ref 0–0.2)
PH UR STRIP: 6 [PH] (ref 5–7.5)
PLATELET # BLD AUTO: 267 10*3/MM3 (ref 140–450)
POTASSIUM SERPL-SCNC: 4.1 MMOL/L (ref 3.5–5.2)
PROT SERPL-MCNC: 7.2 G/DL (ref 6–8.5)
PROT UR QL STRIP: NEGATIVE
RBC # BLD AUTO: 4.19 10*6/MM3 (ref 3.77–5.28)
RBC #/AREA URNS HPF: ABNORMAL /HPF
SODIUM SERPL-SCNC: 142 MMOL/L (ref 136–145)
SP GR UR: 1.01 (ref 1–1.03)
T4 FREE SERPL-MCNC: 1.41 NG/DL (ref 0.93–1.7)
TRIGL SERPL-MCNC: 141 MG/DL (ref 0–150)
TSH SERPL DL<=0.005 MIU/L-ACNC: 1.09 UIU/ML (ref 0.27–4.2)
UNIDENT CRYS URNS QL MICRO: PRESENT /LPF
URINALYSIS REFLEX: NORMAL
UROBILINOGEN UR STRIP-MCNC: 0.2 MG/DL (ref 0.2–1)
VLDLC SERPL CALC-MCNC: 28.2 MG/DL
WBC # BLD AUTO: 6.53 10*3/MM3 (ref 3.4–10.8)
WBC #/AREA URNS HPF: ABNORMAL /HPF

## 2019-09-06 ENCOUNTER — TELEPHONE (OUTPATIENT)
Dept: SPORTS MEDICINE | Facility: CLINIC | Age: 79
End: 2019-09-06

## 2019-09-06 NOTE — TELEPHONE ENCOUNTER
----- Message from Keaton Dao MD sent at 9/5/2019 11:03 PM EDT -----  Labs show:  - Improved cholesterol  - Very mild prediabetes - we should recheck this in 4-6 months to make sure it does not continue progressing.   - Otherwise normal.

## 2019-09-19 ENCOUNTER — HOSPITAL ENCOUNTER (OUTPATIENT)
Dept: CARDIOLOGY | Facility: HOSPITAL | Age: 79
Discharge: HOME | End: 2019-09-19
Admitting: FAMILY MEDICINE

## 2019-09-19 VITALS — BODY MASS INDEX: 22.7 KG/M2 | WEIGHT: 132.94 LBS | HEIGHT: 64 IN

## 2019-09-19 DIAGNOSIS — I10 ESSENTIAL HYPERTENSION: ICD-10-CM

## 2019-09-19 DIAGNOSIS — I73.9 PERIPHERAL ARTERIAL DISEASE (HCC): ICD-10-CM

## 2019-09-19 DIAGNOSIS — R07.89 ATYPICAL CHEST PAIN: ICD-10-CM

## 2019-09-19 DIAGNOSIS — Z00.00 MEDICARE ANNUAL WELLNESS VISIT, SUBSEQUENT: ICD-10-CM

## 2019-09-19 DIAGNOSIS — E78.2 MIXED HYPERLIPIDEMIA: ICD-10-CM

## 2019-09-19 LAB
BH CV NUCLEAR PRIOR STUDY: 2
BH CV STRESS BP STAGE 1: NORMAL
BH CV STRESS COMMENTS STAGE 1: NORMAL
BH CV STRESS DOSE REGADENOSON STAGE 1: 0.4
BH CV STRESS DURATION MIN STAGE 1: 0
BH CV STRESS DURATION SEC STAGE 1: 10
BH CV STRESS HR STAGE 1: 105
BH CV STRESS PROTOCOL 1: NORMAL
BH CV STRESS RECOVERY BP: NORMAL MMHG
BH CV STRESS RECOVERY HR: 92 BPM
BH CV STRESS STAGE 1: 1
LV EF NUC BP: 77 %
MAXIMAL PREDICTED HEART RATE: 139 BPM
PERCENT MAX PREDICTED HR: 75.54 %
STRESS BASELINE BP: NORMAL MMHG
STRESS BASELINE HR: 71 BPM
STRESS PERCENT HR: 89 %
STRESS POST EXERCISE DUR SEC: 10 SEC
STRESS POST PEAK BP: NORMAL MMHG
STRESS POST PEAK HR: 105 BPM
STRESS TARGET HR: 118 BPM

## 2019-09-19 PROCEDURE — 93016 CV STRESS TEST SUPVJ ONLY: CPT | Performed by: INTERNAL MEDICINE

## 2019-09-19 PROCEDURE — 78452 HT MUSCLE IMAGE SPECT MULT: CPT

## 2019-09-19 PROCEDURE — 25010000002 REGADENOSON 0.4 MG/5ML SOLUTION: Performed by: FAMILY MEDICINE

## 2019-09-19 PROCEDURE — 93017 CV STRESS TEST TRACING ONLY: CPT

## 2019-09-19 PROCEDURE — 93018 CV STRESS TEST I&R ONLY: CPT | Performed by: INTERNAL MEDICINE

## 2019-09-19 PROCEDURE — 0 TECHNETIUM TETROFOSMIN KIT: Performed by: FAMILY MEDICINE

## 2019-09-19 PROCEDURE — A9502 TC99M TETROFOSMIN: HCPCS | Performed by: FAMILY MEDICINE

## 2019-09-19 PROCEDURE — 78452 HT MUSCLE IMAGE SPECT MULT: CPT | Performed by: INTERNAL MEDICINE

## 2019-09-19 RX ADMIN — TETROFOSMIN 1 DOSE: 1.38 INJECTION, POWDER, LYOPHILIZED, FOR SOLUTION INTRAVENOUS at 11:55

## 2019-09-19 RX ADMIN — TETROFOSMIN 1 DOSE: 1.38 INJECTION, POWDER, LYOPHILIZED, FOR SOLUTION INTRAVENOUS at 11:05

## 2019-09-19 RX ADMIN — REGADENOSON 0.4 MG: 0.08 INJECTION, SOLUTION INTRAVENOUS at 11:55

## 2019-09-23 ENCOUNTER — TELEPHONE (OUTPATIENT)
Dept: SPORTS MEDICINE | Facility: CLINIC | Age: 79
End: 2019-09-23

## 2019-09-24 DIAGNOSIS — E03.9 HYPOTHYROIDISM, UNSPECIFIED TYPE: ICD-10-CM

## 2019-09-24 RX ORDER — LEVOTHYROXINE SODIUM 0.07 MG/1
TABLET ORAL
Qty: 90 TABLET | Refills: 1 | Status: SHIPPED | OUTPATIENT
Start: 2019-09-24 | End: 2020-03-04

## 2019-09-24 RX ORDER — ERGOCALCIFEROL 1.25 MG/1
CAPSULE ORAL
Qty: 6 CAPSULE | Refills: 1 | Status: SHIPPED | OUTPATIENT
Start: 2019-09-24 | End: 2020-02-06

## 2019-09-24 NOTE — TELEPHONE ENCOUNTER
Yes patient called this evening starting she remembered the results she was given that she through she had stress test only a few days ago is why she called and asked for results again and have been having memory issues

## 2019-11-04 RX ORDER — CILOSTAZOL 50 MG/1
TABLET ORAL
Qty: 60 TABLET | Refills: 1 | Status: SHIPPED | OUTPATIENT
Start: 2019-11-04 | End: 2020-01-29

## 2020-01-29 RX ORDER — CILOSTAZOL 50 MG/1
TABLET ORAL
Qty: 60 TABLET | Refills: 0 | Status: SHIPPED | OUTPATIENT
Start: 2020-01-29 | End: 2020-02-24

## 2020-02-06 RX ORDER — ERGOCALCIFEROL 1.25 MG/1
CAPSULE ORAL
Qty: 6 CAPSULE | Refills: 5 | Status: SHIPPED | OUTPATIENT
Start: 2020-02-06 | End: 2020-08-31

## 2020-02-24 RX ORDER — CILOSTAZOL 50 MG/1
TABLET ORAL
Qty: 60 TABLET | Refills: 0 | Status: SHIPPED | OUTPATIENT
Start: 2020-02-24 | End: 2020-03-18

## 2020-03-03 DIAGNOSIS — E03.9 HYPOTHYROIDISM, UNSPECIFIED TYPE: ICD-10-CM

## 2020-03-04 RX ORDER — LEVOTHYROXINE SODIUM 0.07 MG/1
TABLET ORAL
Qty: 90 TABLET | Refills: 0 | Status: SHIPPED | OUTPATIENT
Start: 2020-03-04 | End: 2020-06-02

## 2020-03-18 RX ORDER — CILOSTAZOL 50 MG/1
TABLET ORAL
Qty: 60 TABLET | Refills: 5 | Status: SHIPPED | OUTPATIENT
Start: 2020-03-18 | End: 2020-07-23

## 2020-06-02 DIAGNOSIS — E03.9 HYPOTHYROIDISM, UNSPECIFIED TYPE: ICD-10-CM

## 2020-06-02 RX ORDER — LEVOTHYROXINE SODIUM 0.07 MG/1
TABLET ORAL
Qty: 90 TABLET | Refills: 1 | Status: SHIPPED | OUTPATIENT
Start: 2020-06-02 | End: 2020-11-23 | Stop reason: SDUPTHER

## 2020-07-20 ENCOUNTER — OFFICE VISIT (OUTPATIENT)
Dept: SPORTS MEDICINE | Facility: CLINIC | Age: 80
End: 2020-07-20

## 2020-07-20 VITALS
DIASTOLIC BLOOD PRESSURE: 70 MMHG | OXYGEN SATURATION: 97 % | BODY MASS INDEX: 22.2 KG/M2 | HEIGHT: 64 IN | WEIGHT: 130 LBS | HEART RATE: 90 BPM | SYSTOLIC BLOOD PRESSURE: 150 MMHG

## 2020-07-20 DIAGNOSIS — M54.2 NECK PAIN: ICD-10-CM

## 2020-07-20 DIAGNOSIS — M79.652 LEFT THIGH PAIN: Primary | ICD-10-CM

## 2020-07-20 DIAGNOSIS — R61 NIGHT SWEATS: ICD-10-CM

## 2020-07-20 DIAGNOSIS — M16.12 PRIMARY OSTEOARTHRITIS OF LEFT HIP: ICD-10-CM

## 2020-07-20 PROCEDURE — 99215 OFFICE O/P EST HI 40 MIN: CPT | Performed by: FAMILY MEDICINE

## 2020-07-20 PROCEDURE — 73502 X-RAY EXAM HIP UNI 2-3 VIEWS: CPT | Performed by: FAMILY MEDICINE

## 2020-07-20 PROCEDURE — 72040 X-RAY EXAM NECK SPINE 2-3 VW: CPT | Performed by: FAMILY MEDICINE

## 2020-07-20 PROCEDURE — 71046 X-RAY EXAM CHEST 2 VIEWS: CPT | Performed by: FAMILY MEDICINE

## 2020-07-20 NOTE — PROGRESS NOTES
"Jael is a 79 y.o. year old female evaluation of a problem that is new to this examiner.    Chief Complaint   Patient presents with   • Neck Pain   • LT thigh pain       History of Present Illness   HPI   States has had shingles on feet a total of 4 times between both feet this year.     New problem - Left hip/thigh pain started Wednesday last week; started as stinging sensation. Mild and resolved. Then developed intermittent severe sharp pain worse with walking.     Also new - Neck pain - about 10 days ago was pushing a shelf and a box fell onto her neck posterior around C7-T1. New numbness in both arms.     Night sweats - new x few months. Moderate - enough to change shirt but not sheets.     I have reviewed the patient's medical, family, and social history in detail and updated the computerized patient record.    Review of Systems   Constitutional: Negative for chills and fever.        Night sweats   HENT: Negative.    Eyes: Negative.    Respiratory: Negative for shortness of breath.    Cardiovascular: Negative for chest pain.   Gastrointestinal: Negative.    Endocrine: Negative.    Genitourinary: Negative.    Musculoskeletal: Positive for arthralgias.   Skin: Positive for rash.   Neurological: Positive for numbness. Negative for weakness.   Psychiatric/Behavioral: The patient is nervous/anxious.        /70   Pulse 90   Ht 162.6 cm (64.02\")   Wt 59 kg (130 lb)   SpO2 97%   BMI 22.30 kg/m²      Physical Exam   Constitutional: She is oriented to person, place, and time. She appears well-developed and well-nourished.   HENT:   Head: Normocephalic and atraumatic.   Eyes: Pupils are equal, round, and reactive to light. Conjunctivae are normal.   Cardiovascular: Normal rate, regular rhythm and normal heart sounds.   Pulmonary/Chest: Effort normal and breath sounds normal.   Musculoskeletal:   Left hip tenderness anteriorly.  Pain with cross leg stretch and internal rotation.    Cervical spine tenderness " bilateral lower cervical paraspinals.  Limited range of motion secondary to pain.   Lymphadenopathy:     She has no cervical adenopathy.   Neurological: She is alert and oriented to person, place, and time.   Psychiatric: She has a normal mood and affect.   Vitals reviewed.        Current Outpatient Medications:   •  levothyroxine (SYNTHROID, LEVOTHROID) 75 MCG tablet, Take 1 tablet by mouth once daily, Disp: 90 tablet, Rfl: 1  •  Multiple Vitamin (MULTI-VITAMIN) tablet, Take by mouth., Disp: , Rfl:   •  vitamin D (ERGOCALCIFEROL) 1.25 MG (21784 UT) capsule capsule, TAKE 1 CAPSULE BY MOUTH ONCE A WEEK, Disp: 6 capsule, Rfl: 5  •  amLODIPine (NORVASC) 5 MG tablet, Take 0.5 tablets by mouth Daily., Disp: 45 tablet, Rfl: 1  •  aspirin 81 MG EC tablet, Take 1 tablet by mouth nightly., Disp: , Rfl:   •  carboxymethylcellulose (REFRESH PLUS) 0.5 % solution, 1 drop 2 (Two) Times a Day As Needed for Dry Eyes., Disp: , Rfl:   •  cilostazol (PLETAL) 50 MG tablet, TAKE 1 TABLET BY MOUTH TWICE DAILY BEFORE MEAL(S), Disp: 180 tablet, Rfl: 1  •  ketoconazole (NIZORAL) 2 % shampoo, USE EVERY OTHER DAY LATHER LEAVE IN PLACE FOR 2 5 MINUTES AND THEN RINSE OFF WITH WATER, Disp: , Rfl: 3     Left Hip X-Ray  Indication: Pain  AP and Frog Leg views    Findings:  No fracture  No bony lesion  Normal soft tissues  Moderate severity degenerative changes of the hip    No prior studies were available for comparison.     Cervical Spine X-Ray    Indication: Pain  Views: AP and Lateral    Findings:  No fracture  No bony lesions  Soft tissues normal  Multilevel degenerative changes    No prior studies are available for comparison.    Chest X-Ray  Indication: Night sweats  Views: PA and Lateral    Findings:  Normal lung markings.  No pleural effusion.  Heart size and shape are normal.  Mediastinum is normal.  No visible rib fractures.   Overall, normal chest.    There were no previous studies available for comparison.     Diagnoses and all orders  for this visit:    Left thigh pain  -     XR Hip With or Without Pelvis 2 - 3 View Left    Neck pain  -     XR Spine Cervical 2 or 3 View  -     Ambulatory Referral to Physical Therapy Evaluate and treat    Night sweats  -     XR Chest PA & Lateral  -     TSH  -     T4, Free  -     CBC & Differential  -     Comprehensive Metabolic Panel  -     Lactate Dehydrogenase  -     QuantiFERON TB Gold  -     Sedimentation Rate  -     C-reactive Protein    Primary osteoarthritis of left hip  -     Ambulatory Referral to Physical Therapy Evaluate and treat    Regarding her neck, this seems like some benign pain related to her chronic arthritis there.  We will start some physical therapy.  Regarding her hip, discussed treatment options, will start physical therapy.  Will work up night sweats.        EMR Dragon/Transcription disclaimer:    Much of this encounter note is an electronic transcription/translation of spoken language to printed text.  The electronic translation of spoken language may permit erroneous, or at times, nonsensical words or phrases to be inadvertently transcribed.  Although I have reviewed the note for such errors some may still exist.

## 2020-07-23 ENCOUNTER — CLINICAL SUPPORT (OUTPATIENT)
Dept: SPORTS MEDICINE | Facility: CLINIC | Age: 80
End: 2020-07-23

## 2020-07-23 DIAGNOSIS — R82.90 ABNORMAL URINALYSIS: ICD-10-CM

## 2020-07-23 DIAGNOSIS — N30.90 CYSTITIS: ICD-10-CM

## 2020-07-23 DIAGNOSIS — R30.0 DYSURIA: Primary | ICD-10-CM

## 2020-07-23 DIAGNOSIS — R61 NIGHT SWEATS: Primary | ICD-10-CM

## 2020-07-23 LAB
ALBUMIN SERPL-MCNC: 4.2 G/DL (ref 3.6–4.6)
ALBUMIN/GLOB SERPL: 1.6 {RATIO} (ref 1.2–2.2)
ALP SERPL-CCNC: 102 IU/L (ref 39–117)
ALT SERPL-CCNC: 20 IU/L (ref 0–32)
AST SERPL-CCNC: 22 IU/L (ref 0–40)
BASOPHILS # BLD AUTO: 0 X10E3/UL (ref 0–0.2)
BASOPHILS NFR BLD AUTO: 0 %
BILIRUB BLD-MCNC: NEGATIVE MG/DL
BILIRUB SERPL-MCNC: 0.3 MG/DL (ref 0–1.2)
BUN SERPL-MCNC: 11 MG/DL (ref 8–27)
BUN/CREAT SERPL: 18 (ref 12–28)
CALCIUM SERPL-MCNC: 9.2 MG/DL (ref 8.7–10.3)
CHLORIDE SERPL-SCNC: 105 MMOL/L (ref 96–106)
CLARITY, POC: CLEAR
CO2 SERPL-SCNC: 24 MMOL/L (ref 20–29)
COLOR UR: YELLOW
CREAT SERPL-MCNC: 0.6 MG/DL (ref 0.57–1)
CRP SERPL-MCNC: 23 MG/L (ref 0–10)
EOSINOPHIL # BLD AUTO: 0.1 X10E3/UL (ref 0–0.4)
EOSINOPHIL NFR BLD AUTO: 1 %
ERYTHROCYTE [DISTWIDTH] IN BLOOD BY AUTOMATED COUNT: 11.6 % (ref 11.7–15.4)
ERYTHROCYTE [SEDIMENTATION RATE] IN BLOOD BY WESTERGREN METHOD: 67 MM/HR (ref 0–40)
GAMMA INTERFERON BACKGROUND BLD IA-ACNC: 0.02 IU/ML
GLOBULIN SER CALC-MCNC: 2.6 G/DL (ref 1.5–4.5)
GLUCOSE SERPL-MCNC: 81 MG/DL (ref 65–99)
GLUCOSE UR STRIP-MCNC: NEGATIVE MG/DL
HCT VFR BLD AUTO: 36 % (ref 34–46.6)
HGB BLD-MCNC: 12.4 G/DL (ref 11.1–15.9)
IMM GRANULOCYTES # BLD AUTO: 0 X10E3/UL (ref 0–0.1)
IMM GRANULOCYTES NFR BLD AUTO: 0 %
KETONES UR QL: NEGATIVE
LDH SERPL-CCNC: 397 IU/L (ref 119–226)
LEUKOCYTE EST, POC: ABNORMAL
LYMPHOCYTES # BLD AUTO: 1.9 X10E3/UL (ref 0.7–3.1)
LYMPHOCYTES NFR BLD AUTO: 28 %
M TB IFN-G BLD-IMP: NEGATIVE
M TB IFN-G CD4+ BCKGRND COR BLD-ACNC: 0.02 IU/ML
M TB IFN-G CD4+CD8+ BCKGRND COR BLD-ACNC: 0.02 IU/ML
MCH RBC QN AUTO: 31.6 PG (ref 26.6–33)
MCHC RBC AUTO-ENTMCNC: 34.4 G/DL (ref 31.5–35.7)
MCV RBC AUTO: 92 FL (ref 79–97)
MITOGEN IGNF BLD-ACNC: 1.25 IU/ML
MONOCYTES # BLD AUTO: 0.5 X10E3/UL (ref 0.1–0.9)
MONOCYTES NFR BLD AUTO: 8 %
NEUTROPHILS # BLD AUTO: 4.3 X10E3/UL (ref 1.4–7)
NEUTROPHILS NFR BLD AUTO: 63 %
NITRITE UR-MCNC: NEGATIVE MG/ML
PH UR: 5.5 [PH] (ref 5–8)
PLATELET # BLD AUTO: 317 X10E3/UL (ref 150–450)
POTASSIUM SERPL-SCNC: 4.1 MMOL/L (ref 3.5–5.2)
PROT SERPL-MCNC: 6.8 G/DL (ref 6–8.5)
PROT UR STRIP-MCNC: ABNORMAL MG/DL
QUANTIFERON INCUBATION: NORMAL
RBC # BLD AUTO: 3.93 X10E6/UL (ref 3.77–5.28)
RBC # UR STRIP: ABNORMAL /UL
SERVICE CMNT-IMP: NORMAL
SODIUM SERPL-SCNC: 141 MMOL/L (ref 134–144)
SP GR UR: 1.02 (ref 1–1.03)
T4 FREE SERPL-MCNC: 1.23 NG/DL (ref 0.82–1.77)
TSH SERPL DL<=0.005 MIU/L-ACNC: 1.07 UIU/ML (ref 0.45–4.5)
UROBILINOGEN UR QL: NORMAL
WBC # BLD AUTO: 6.9 X10E3/UL (ref 3.4–10.8)

## 2020-07-23 PROCEDURE — 81003 URINALYSIS AUTO W/O SCOPE: CPT | Performed by: FAMILY MEDICINE

## 2020-07-23 RX ORDER — CILOSTAZOL 50 MG/1
TABLET ORAL
Qty: 180 TABLET | Refills: 1 | Status: SHIPPED | OUTPATIENT
Start: 2020-07-23 | End: 2020-11-23 | Stop reason: SDUPTHER

## 2020-07-23 RX ORDER — CIPROFLOXACIN 500 MG/1
500 TABLET, FILM COATED ORAL 2 TIMES DAILY
Qty: 14 TABLET | Refills: 0 | Status: SHIPPED | OUTPATIENT
Start: 2020-07-23 | End: 2020-11-23

## 2020-07-23 NOTE — PROGRESS NOTES
Patient came and dropped off urinalysis today.  Reviewed moderate blood, protein 30 mg/DL, and small leukocytes.  We will send off for full laboratory evaluation but will also go ahead and treat her for developing urinary tract infection.

## 2020-07-25 LAB
APPEARANCE UR: ABNORMAL
BACTERIA #/AREA URNS HPF: ABNORMAL /HPF
BACTERIA UR CULT: ABNORMAL
BACTERIA UR CULT: ABNORMAL
BILIRUB UR QL STRIP: NEGATIVE
CASTS URNS MICRO: ABNORMAL
COLOR UR: YELLOW
EPI CELLS #/AREA URNS HPF: ABNORMAL /HPF
GLUCOSE UR QL: NEGATIVE
HGB UR QL STRIP: ABNORMAL
KETONES UR QL STRIP: NEGATIVE
LEUKOCYTE ESTERASE UR QL STRIP: ABNORMAL
NITRITE UR QL STRIP: NEGATIVE
OTHER ANTIBIOTIC SUSC ISLT: ABNORMAL
PH UR STRIP: 5.5 [PH] (ref 5–8)
PROT UR QL STRIP: ABNORMAL
RBC #/AREA URNS HPF: ABNORMAL /HPF
SP GR UR: 1.02 (ref 1–1.03)
UROBILINOGEN UR STRIP-MCNC: ABNORMAL MG/DL
WBC #/AREA URNS HPF: ABNORMAL /HPF

## 2020-07-30 ENCOUNTER — TELEPHONE (OUTPATIENT)
Dept: SPORTS MEDICINE | Facility: CLINIC | Age: 80
End: 2020-07-30

## 2020-08-03 ENCOUNTER — TELEPHONE (OUTPATIENT)
Dept: SPORTS MEDICINE | Facility: CLINIC | Age: 80
End: 2020-08-03

## 2020-08-03 NOTE — TELEPHONE ENCOUNTER
Verified labs with patient. She wasn't feeling well enough to get her CT of the chest done last week. She stated she will call and schedule CT so we can get those results and go from there.

## 2020-08-03 NOTE — TELEPHONE ENCOUNTER
Pt called stating that she is still having the night sweats and is running a slight fever highest 99.0. shes not sure what to do would like a call if possible. [phone]

## 2020-08-03 NOTE — TELEPHONE ENCOUNTER
Patient was called by scheduling team but refused to schedule since she would like to Dr. ceja before scheduling.    States she is still having night sweats but feeling better.  She has questions, asked for a call back.    Provided [phone] number to schedule CT.

## 2020-08-07 ENCOUNTER — HOSPITAL ENCOUNTER (OUTPATIENT)
Dept: CT IMAGING | Facility: HOSPITAL | Age: 80
Discharge: HOME | End: 2020-08-07
Admitting: FAMILY MEDICINE

## 2020-08-07 ENCOUNTER — APPOINTMENT (OUTPATIENT)
Dept: CT IMAGING | Facility: HOSPITAL | Age: 80
End: 2020-08-07

## 2020-08-07 DIAGNOSIS — R61 NIGHT SWEATS: ICD-10-CM

## 2020-08-07 PROCEDURE — 71250 CT THORAX DX C-: CPT

## 2020-08-10 NOTE — PROGRESS NOTES
Informed patient of results. Patient stated still having cough. Wanted to know if she could get her urine rechecked for having spasms after urinating still?

## 2020-08-14 DIAGNOSIS — Z12.31 SCREENING MAMMOGRAM, ENCOUNTER FOR: Primary | ICD-10-CM

## 2020-08-17 ENCOUNTER — TELEPHONE (OUTPATIENT)
Dept: SPORTS MEDICINE | Facility: CLINIC | Age: 80
End: 2020-08-17

## 2020-08-17 NOTE — TELEPHONE ENCOUNTER
Patient called in, wants to cancel her hemant appointment but wasn't sure who to contact.     Thanks  hilario

## 2020-08-19 NOTE — PROGRESS NOTES
Informed patient. She still has night sweats. Improving some. Said she is wanting UA and culture done. Does she need to see you as a visit or does she need to do as a lab?

## 2020-08-20 DIAGNOSIS — Z12.31 SCREENING MAMMOGRAM, ENCOUNTER FOR: ICD-10-CM

## 2020-08-27 DIAGNOSIS — N39.0 URINARY TRACT INFECTION WITHOUT HEMATURIA, SITE UNSPECIFIED: Primary | ICD-10-CM

## 2020-08-28 LAB
APPEARANCE UR: CLEAR
BACTERIA #/AREA URNS HPF: NORMAL /HPF
BILIRUB UR QL STRIP: NEGATIVE
COLOR UR: YELLOW
EPI CELLS #/AREA URNS HPF: NORMAL /HPF (ref 0–10)
GLUCOSE UR QL: NEGATIVE
HGB UR QL STRIP: NEGATIVE
KETONES UR QL STRIP: NEGATIVE
LEUKOCYTE ESTERASE UR QL STRIP: NEGATIVE
MICRO URNS: NORMAL
MICRO URNS: NORMAL
MUCOUS THREADS URNS QL MICRO: PRESENT /HPF
NITRITE UR QL STRIP: NEGATIVE
PH UR STRIP: 7.5 [PH] (ref 5–7.5)
PROT UR QL STRIP: NEGATIVE
RBC #/AREA URNS HPF: NORMAL /HPF (ref 0–2)
SP GR UR: 1.01 (ref 1–1.03)
URINALYSIS REFLEX: NORMAL
UROBILINOGEN UR STRIP-MCNC: 0.2 MG/DL (ref 0.2–1)
WBC #/AREA URNS HPF: NORMAL /HPF (ref 0–5)

## 2020-08-31 RX ORDER — ERGOCALCIFEROL 1.25 MG/1
CAPSULE ORAL
Qty: 12 CAPSULE | Refills: 0 | Status: SHIPPED | OUTPATIENT
Start: 2020-08-31 | End: 2020-11-23

## 2020-10-02 ENCOUNTER — CLINICAL SUPPORT (OUTPATIENT)
Dept: SPORTS MEDICINE | Facility: CLINIC | Age: 80
End: 2020-10-02

## 2020-10-02 DIAGNOSIS — Z23 FLU VACCINE NEED: Primary | ICD-10-CM

## 2020-10-02 PROCEDURE — G0008 ADMIN INFLUENZA VIRUS VAC: HCPCS | Performed by: FAMILY MEDICINE

## 2020-10-02 PROCEDURE — 90694 VACC AIIV4 NO PRSRV 0.5ML IM: CPT | Performed by: FAMILY MEDICINE

## 2020-11-23 ENCOUNTER — OFFICE VISIT (OUTPATIENT)
Dept: SPORTS MEDICINE | Facility: CLINIC | Age: 80
End: 2020-11-23

## 2020-11-23 VITALS
DIASTOLIC BLOOD PRESSURE: 78 MMHG | OXYGEN SATURATION: 98 % | WEIGHT: 130 LBS | HEART RATE: 72 BPM | BODY MASS INDEX: 22.2 KG/M2 | SYSTOLIC BLOOD PRESSURE: 126 MMHG | HEIGHT: 64 IN | RESPIRATION RATE: 16 BRPM | TEMPERATURE: 96.9 F

## 2020-11-23 DIAGNOSIS — R73.02 IMPAIRED GLUCOSE TOLERANCE: ICD-10-CM

## 2020-11-23 DIAGNOSIS — E55.9 VITAMIN D DEFICIENCY: ICD-10-CM

## 2020-11-23 DIAGNOSIS — E03.9 HYPOTHYROIDISM, UNSPECIFIED TYPE: ICD-10-CM

## 2020-11-23 DIAGNOSIS — M85.88 OSTEOPENIA OF LUMBAR SPINE: ICD-10-CM

## 2020-11-23 DIAGNOSIS — Z00.00 MEDICARE ANNUAL WELLNESS VISIT, SUBSEQUENT: Primary | ICD-10-CM

## 2020-11-23 DIAGNOSIS — E78.2 MIXED HYPERLIPIDEMIA: ICD-10-CM

## 2020-11-23 DIAGNOSIS — I10 ESSENTIAL HYPERTENSION: ICD-10-CM

## 2020-11-23 DIAGNOSIS — I73.9 PERIPHERAL ARTERIAL DISEASE (HCC): ICD-10-CM

## 2020-11-23 PROCEDURE — 1170F FXNL STATUS ASSESSED: CPT | Performed by: FAMILY MEDICINE

## 2020-11-23 PROCEDURE — 1125F AMNT PAIN NOTED PAIN PRSNT: CPT | Performed by: FAMILY MEDICINE

## 2020-11-23 PROCEDURE — 1159F MED LIST DOCD IN RCRD: CPT | Performed by: FAMILY MEDICINE

## 2020-11-23 PROCEDURE — 99397 PER PM REEVAL EST PAT 65+ YR: CPT | Performed by: FAMILY MEDICINE

## 2020-11-23 PROCEDURE — 96160 PT-FOCUSED HLTH RISK ASSMT: CPT | Performed by: FAMILY MEDICINE

## 2020-11-23 PROCEDURE — G0439 PPPS, SUBSEQ VISIT: HCPCS | Performed by: FAMILY MEDICINE

## 2020-11-23 RX ORDER — CILOSTAZOL 50 MG/1
50 TABLET ORAL
Qty: 180 TABLET | Refills: 3 | Status: SHIPPED | OUTPATIENT
Start: 2020-11-23 | End: 2021-03-04 | Stop reason: SDUPTHER

## 2020-11-23 RX ORDER — LEVOTHYROXINE SODIUM 0.07 MG/1
75 TABLET ORAL DAILY
Qty: 90 TABLET | Refills: 3 | Status: SHIPPED | OUTPATIENT
Start: 2020-11-23 | End: 2021-03-08 | Stop reason: SDUPTHER

## 2020-11-23 RX ORDER — CHOLECALCIFEROL (VITAMIN D3) 1250 MCG
50000 CAPSULE ORAL
Qty: 6 CAPSULE | Refills: 3 | Status: SHIPPED | OUTPATIENT
Start: 2020-11-23 | End: 2021-04-26 | Stop reason: SDUPTHER
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
## 2020-11-24 LAB
25(OH)D3+25(OH)D2 SERPL-MCNC: 53.8 NG/ML (ref 30–100)
ALBUMIN SERPL-MCNC: 4.6 G/DL (ref 3.5–5.2)
ALBUMIN/GLOB SERPL: 2 G/DL
ALP SERPL-CCNC: 100 U/L (ref 39–117)
ALT SERPL-CCNC: 20 U/L (ref 1–33)
AST SERPL-CCNC: 18 U/L (ref 1–32)
BASOPHILS # BLD AUTO: 0.02 10*3/MM3 (ref 0–0.2)
BASOPHILS NFR BLD AUTO: 0.4 % (ref 0–1.5)
BILIRUB SERPL-MCNC: 0.4 MG/DL (ref 0–1.2)
BUN SERPL-MCNC: 13 MG/DL (ref 8–23)
BUN/CREAT SERPL: 21.7 (ref 7–25)
CALCIUM SERPL-MCNC: 9.5 MG/DL (ref 8.6–10.5)
CHLORIDE SERPL-SCNC: 101 MMOL/L (ref 98–107)
CHOLEST SERPL-MCNC: 222 MG/DL (ref 0–200)
CHOLEST/HDLC SERPL: 4.11 {RATIO}
CO2 SERPL-SCNC: 30 MMOL/L (ref 22–29)
CREAT SERPL-MCNC: 0.6 MG/DL (ref 0.57–1)
EOSINOPHIL # BLD AUTO: 0.07 10*3/MM3 (ref 0–0.4)
EOSINOPHIL NFR BLD AUTO: 1.6 % (ref 0.3–6.2)
ERYTHROCYTE [DISTWIDTH] IN BLOOD BY AUTOMATED COUNT: 12.2 % (ref 12.3–15.4)
GLOBULIN SER CALC-MCNC: 2.3 GM/DL
GLUCOSE SERPL-MCNC: 97 MG/DL (ref 65–99)
HBA1C MFR BLD: 5.8 % (ref 4.8–5.6)
HCT VFR BLD AUTO: 39.2 % (ref 34–46.6)
HCV AB S/CO SERPL IA: <0.1 S/CO RATIO (ref 0–0.9)
HDLC SERPL-MCNC: 54 MG/DL (ref 40–60)
HGB BLD-MCNC: 13.6 G/DL (ref 12–15.9)
IMM GRANULOCYTES # BLD AUTO: 0.01 10*3/MM3 (ref 0–0.05)
IMM GRANULOCYTES NFR BLD AUTO: 0.2 % (ref 0–0.5)
LDLC SERPL CALC-MCNC: 144 MG/DL (ref 0–100)
LYMPHOCYTES # BLD AUTO: 1.78 10*3/MM3 (ref 0.7–3.1)
LYMPHOCYTES NFR BLD AUTO: 39.7 % (ref 19.6–45.3)
MCH RBC QN AUTO: 31.1 PG (ref 26.6–33)
MCHC RBC AUTO-ENTMCNC: 34.7 G/DL (ref 31.5–35.7)
MCV RBC AUTO: 89.7 FL (ref 79–97)
MONOCYTES # BLD AUTO: 0.38 10*3/MM3 (ref 0.1–0.9)
MONOCYTES NFR BLD AUTO: 8.5 % (ref 5–12)
NEUTROPHILS # BLD AUTO: 2.22 10*3/MM3 (ref 1.7–7)
NEUTROPHILS NFR BLD AUTO: 49.6 % (ref 42.7–76)
NRBC BLD AUTO-RTO: 0 /100 WBC (ref 0–0.2)
PLATELET # BLD AUTO: 223 10*3/MM3 (ref 140–450)
POTASSIUM SERPL-SCNC: 4.1 MMOL/L (ref 3.5–5.2)
PROT SERPL-MCNC: 6.9 G/DL (ref 6–8.5)
RBC # BLD AUTO: 4.37 10*6/MM3 (ref 3.77–5.28)
SODIUM SERPL-SCNC: 137 MMOL/L (ref 136–145)
T4 FREE SERPL-MCNC: 1.59 NG/DL (ref 0.93–1.7)
TRIGL SERPL-MCNC: 132 MG/DL (ref 0–150)
TSH SERPL DL<=0.005 MIU/L-ACNC: 0.72 UIU/ML (ref 0.27–4.2)
VLDLC SERPL CALC-MCNC: 24 MG/DL (ref 5–40)
WBC # BLD AUTO: 4.48 10*3/MM3 (ref 3.4–10.8)

## 2020-12-02 ENCOUNTER — TELEPHONE (OUTPATIENT)
Dept: SPORTS MEDICINE | Facility: CLINIC | Age: 80
End: 2020-12-02

## 2020-12-02 NOTE — TELEPHONE ENCOUNTER
Patient requested Dexa scan be done at [facility].     Next available Dexa scan at [facility] is on Feburary 17, 2021. Appointment has been rescheduled. Left patient a Vm with apt time and date. Patient has been added to waiting list for any cancellations.

## 2020-12-11 ENCOUNTER — HOSPITAL ENCOUNTER (OUTPATIENT)
Dept: BONE DENSITY | Facility: HOSPITAL | Age: 80
Discharge: HOME | End: 2020-12-11
Admitting: FAMILY MEDICINE

## 2020-12-11 DIAGNOSIS — M85.88 OSTEOPENIA OF LUMBAR SPINE: ICD-10-CM

## 2020-12-11 PROCEDURE — 77080 DXA BONE DENSITY AXIAL: CPT

## 2020-12-20 DIAGNOSIS — M85.88 OSTEOPENIA OF LUMBAR SPINE: Primary | ICD-10-CM

## 2020-12-31 ENCOUNTER — APPOINTMENT (OUTPATIENT)
Dept: BONE DENSITY | Facility: HOSPITAL | Age: 80
End: 2020-12-31

## 2021-01-31 ENCOUNTER — IMMUNIZATION (OUTPATIENT)
Dept: VACCINE CLINIC | Facility: HOSPITAL | Age: 81
End: 2021-01-31

## 2021-01-31 PROCEDURE — 0001A: CPT | Performed by: INTERNAL MEDICINE

## 2021-01-31 PROCEDURE — 91300 HC SARSCOV02 VAC 30MCG/0.3ML IM: CPT | Performed by: INTERNAL MEDICINE

## 2021-02-21 ENCOUNTER — IMMUNIZATION (OUTPATIENT)
Dept: VACCINE CLINIC | Facility: HOSPITAL | Age: 81
End: 2021-02-21

## 2021-02-21 ENCOUNTER — APPOINTMENT (OUTPATIENT)
Dept: GENERAL RADIOLOGY | Facility: HOSPITAL | Age: 81
End: 2021-02-21

## 2021-02-21 ENCOUNTER — HOSPITAL ENCOUNTER (EMERGENCY)
Facility: HOSPITAL | Age: 81
Discharge: HOME | End: 2021-02-21
Attending: EMERGENCY MEDICINE | Admitting: EMERGENCY MEDICINE

## 2021-02-21 ENCOUNTER — EPISODE CHANGES (OUTPATIENT)
Dept: CASE MANAGEMENT | Facility: OTHER | Age: 81
End: 2021-02-21

## 2021-02-21 VITALS
HEART RATE: 97 BPM | DIASTOLIC BLOOD PRESSURE: 69 MMHG | TEMPERATURE: 98.1 F | SYSTOLIC BLOOD PRESSURE: 159 MMHG | OXYGEN SATURATION: 95 % | RESPIRATION RATE: 18 BRPM

## 2021-02-21 DIAGNOSIS — M79.604 PAIN OF RIGHT LOWER EXTREMITY: Primary | ICD-10-CM

## 2021-02-21 PROCEDURE — 73590 X-RAY EXAM OF LOWER LEG: CPT

## 2021-02-21 PROCEDURE — 91300 HC SARSCOV02 VAC 30MCG/0.3ML IM: CPT | Performed by: INTERNAL MEDICINE

## 2021-02-21 PROCEDURE — 99282 EMERGENCY DEPT VISIT SF MDM: CPT

## 2021-02-21 PROCEDURE — 0002A: CPT | Performed by: INTERNAL MEDICINE

## 2021-02-21 PROCEDURE — 73552 X-RAY EXAM OF FEMUR 2/>: CPT

## 2021-02-21 NOTE — ED NOTES
Fell down 2-3 steps around 12am, denies hitting her head or any LOC.  C/o right leg not working right and states she has a small cut on her left lower leg.  Patient states that she just received her 2nd covid vaccine today.     Akosua Willis, RN  02/21/21 1135

## 2021-02-21 NOTE — ED PROVIDER NOTES
EMERGENCY DEPARTMENT ENCOUNTER    Room Number:  27/27  Date of encounter:  2/25/2021  PCP: Keaton Dao MD  Historian: Patient     I used full protective equipment while examining this patient.  This includes face mask, gloves and protective eyewear.  I washed my hands before entering the room and immediately upon leaving the room      HPI:  Chief Complaint: Fall, right leg pain  A complete HPI/ROS/PMH/PSH/SH/FH are unobtainable due to: None    Context: Jael Navarro is a 80 y.o. female who presents to the ED c/o fall, right leg pain.  Patient tripped and fell landing on both knees on her steps.  Injury occurred last night.  She complains of pain in the right distal femur which is worsened when she straightens her leg or bears weight.  She also complains of pain into the tib-fib area.  She has had a number of fractures in the past and is concerned about fracture.  Patient also states she has chronic arterial disease of both legs and is followed by Dr. Sylvia Darnell.  She states she also has neuropathy of both legs.  Patient states the pain is mild at rest and becomes moderate to severe when bearing weight.      MEDICAL RECORD REVIEW  I reviewed office visits with Dr. Keaton Dao.  Patient has a history of neuropathic pain as well as peripheral artery disease.  She does have chronic balance troubles as well.    PAST MEDICAL HISTORY  Active Ambulatory Problems     Diagnosis Date Noted   • Hypothyroidism 07/20/2016   • Atopic rhinitis 07/21/2016   • Allergy to dog dander 07/21/2016   • Anxiety 07/21/2016   • Allergy to cats 07/21/2016   • Cataract 07/21/2016   • Degeneration of intervertebral disc of cervical region 07/21/2016   • Fibromyalgia 07/21/2016   • Hyperlipidemia 07/21/2016   • Essential hypertension 07/21/2016   • Impaired glucose tolerance 07/21/2016   • Bladder prolapse, female, acquired 07/21/2016   • Rosacea 07/21/2016   • Urinary incontinence 07/21/2016   • Vitamin D deficiency 07/21/2016    • Macular degeneration of both eyes 08/29/2018   • Peripheral arterial disease (CMS/HCC) 04/02/2019     Resolved Ambulatory Problems     Diagnosis Date Noted   • Gastroesophageal reflux disease 07/21/2016   • Microscopic hematuria 07/21/2016   • LLQ pain 03/29/2017   • Acute cystitis without hematuria 12/12/2017   • Acute cystitis 12/12/2017     Past Medical History:   Diagnosis Date   • Allergic rhinitis    • Degeneration, intervertebral disc, thoracic    • Disease of thyroid gland    • Fracture of ankle    • Gingival and periodontal disease    • Hypertension    • Irritable bowel syndrome    • Knee fracture    • Wrist fracture          PAST SURGICAL HISTORY  Past Surgical History:   Procedure Laterality Date   • ABDOMINOPLASTY     • APPENDECTOMY     • CATARACT EXTRACTION     • COLONOSCOPY  unknown   • COLONOSCOPY N/A 3/29/2017    Procedure: COLONOSCOPY TO CECUM WITH COLD BIOPSIES;  Surgeon: Kyle Hidalgo MD;  Location: [facility] ENDOSCOPY;  Service:    • DILATATION AND CURETTAGE     • EXCISION BENIGN SKIN LESION SCALP / NECK / HANDS / FEET / GENITALIA      scalp-benign   • HAND SURGERY      mass between thumb and forefinger   • HYSTERECTOMY           FAMILY HISTORY  Family History   Problem Relation Age of Onset   • Heart attack Mother    • Alzheimer's disease Mother    • Glaucoma Mother    • Heart disease Mother    • Hypertension Mother    • Thyroid disease Mother    • Heart disease Father    • Alzheimer's disease Sister    • Anxiety disorder Daughter    • Bipolar disorder Daughter    • Depression Daughter    • Stroke Daughter    • Colon polyps Daughter    • Heart attack Maternal Grandmother    • Breast cancer Other    • Lung cancer Other    • Ovarian cancer Other    • Diabetes Other    • Heart disease Other    • Diabetes Cousin    • Emphysema Cousin    • Heart disease Cousin    • Multiple sclerosis Cousin    • Heart disease Other    • Nephrolithiasis Other          SOCIAL HISTORY  Social History      Socioeconomic History   • Marital status:      Spouse name: Not on file   • Number of children: Not on file   • Years of education: Not on file   • Highest education level: Not on file   Social Needs   • Financial resource strain: Not hard at all   • Food insecurity     Worry: Never true     Inability: Never true   • Transportation needs     Medical: No     Non-medical: No   Tobacco Use   • Smoking status: Never Smoker   • Smokeless tobacco: Never Used   Substance and Sexual Activity   • Alcohol use: No   • Drug use: No   • Sexual activity: Defer         ALLERGIES  Iodinated diagnostic agents, Other, Iodine, and Sulfa antibiotics       REVIEW OF SYSTEMS  Review of Systems   Constitutional: Negative for fever.   Respiratory: Negative for shortness of breath.    Cardiovascular: Negative for chest pain.           PHYSICAL EXAM    I have reviewed the triage vital signs and nursing notes.    ED Triage Vitals [02/21/21 1354]   Temp Heart Rate Resp BP SpO2   98.1 °F (36.7 °C) 97 18 -- 99 %      Temp src Heart Rate Source Patient Position BP Location FiO2 (%)   Tympanic Monitor -- -- --       Physical Exam  GENERAL: Alert female in no apparent distress.  Vitals reviewed and are fairly unremarkable.  HENT: nares patent, atraumatic  EYES: no scleral icterus  CV: regular rhythm, regular rate-no murmur  RESPIRATORY: normal effort, clear to auscultation bilaterally  ABDOMEN: soft, nontender to palpation  MUSCULOSKELETAL: Spine-no skin tenderness to palpation  Upper extremities-no obvious acute traumatic injury.  There is mild tenderness to palpation about the distal thumb but no significant swelling and there is good range of motion.  Lower extremities-mild diffuse tenderness about the lower thigh.  Knee exam shows good range of motion without significant swelling or tenderness to palpation about the knee.  There is mild tenderness palpation about the proximal tibia medially.  Examination of the foot and ankle is  unremarkable without acute apparent traumatic injury.  NEURO: Strength, sensation, and coordination are grossly intact.  Speech and mentation are unremarkable  SKIN: warm, dry        RADIOLOGY  No Radiology Exams Resulted Within Past 24 Hours    I ordered the above noted radiological studies. Reviewed by me and discussed with radiologist.  See dictation for official radiology interpretation.      PROCEDURES  Procedures      MEDICATIONS GIVEN IN ER    Medications - No data to display      PROGRESS, DATA ANALYSIS, CONSULTS, AND MEDICAL DECISION MAKING    All labs have been independently reviewed by me.  All radiology studies have been reviewed by me and discussed with radiologist dictating the report.   EKG's independently viewed and interpreted by me.  Discussion below represents my analysis of pertinent findings related to patient's condition, differential diagnosis, treatment plan and final disposition.      ED Course as of Feb 25 1111   Sun Feb 21, 2021   1655 XR Tibia Fibula 2 View Right [DB]   1655 X-rays of the femur and tib-fib were reviewed with reading radiologist and show no obvious fracture.    [DB]      ED Course User Index  [DB] Edilberto Tapia MD       AS OF 11:11 EST VITALS:    BP - 159/69  HR - 97  TEMP - 98.1 °F (36.7 °C) (Tympanic)  O2 SATS - 95%      DIAGNOSIS  Final diagnoses:   Pain of right lower extremity         DISPOSITION  DISCHARGE    Patient discharged in stable condition.    Reviewed implications of results, diagnosis, meds, responsibility to follow up, warning signs and symptoms of possible worsening, potential complications and reasons to return to ER, including increased pain or as needed.    Patient/Family voiced understanding of above instructions.    Discussed plan for discharge, as there is no emergent indication for admission. Patient referred to primary care provider for BP management due to today's BP. Pt/family is agreeable and understands need for follow up and repeat  testing.  Pt is aware that discharge does not mean that nothing is wrong but it indicates no emergency is present that requires admission and they must continue care with follow-up as given below or physician of their choice.     FOLLOW-UP  Keaton Dao MD  [address] [id number] [phone]    In 1 week  If Not Better         Medication List      No changes were made to your prescriptions during this visit.                Edilberto Tapia MD  02/21/21 1843       Edilberto Tapia MD  02/25/21 1111

## 2021-02-23 ENCOUNTER — PATIENT OUTREACH (OUTPATIENT)
Dept: CASE MANAGEMENT | Facility: OTHER | Age: 81
End: 2021-02-23

## 2021-02-23 NOTE — OUTREACH NOTE
Care Plan Note      Responses   Annual Wellness Visit:   Patient Has Completed   Care Gaps Addressed  Flu Shot, Pneumonia Vaccine   Flu Shot Status  Up to Date   Pneumonia Vaccine Status  Up to Date   Specific Disease Process Teaching  Hypertension   Other Patient Education/Resources   24/7 [facility] Nurse Call Line, Advanced Care Planning, Archana   24/7 Nurse Call Line Education Method  Send Materials   ACP Education Method  -- [Has information]   Archana Education Method  Send Materials   Does patient have depression diagnosis?  No   Advanced Directives:  Patient Has   Ed Visits past 12 months:  1   Hospitalizations past 12 months  None   Medication Adherence  Medications understood        The main concerns and/or symptoms the patient would like to address are: Talked with patient. Discussed 2/21/21 ED visit regarding pain of right lower extremity following fall . Patient states to be compliant with ED recommendations; reports soreness to right leg; can bear weight on right leg but has difficulty bending knee.Patient is not using assistive device to ambulate.  Patient declines RN-ACM assistance in scheduling appointment and voiced intent to contact PCP and Dr. Sylvia Darnell who she sees patient  regarding arterial disease of both legs. Patient lives with daughter currently; independent with ADL's; meal preparation and transportation.. Patient is compliant with medications and  medical appointments. 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 RN-ACM advised patient regarding use of assistive device for ambulation. She has cane and upright walker and may be begin to use. Note per [facility] rheumatology referral; patient has not been contacted and will ask PCP for information. Patient has received 2nd COVID 19 vaccine.     Education/instruction provided by Care Coordinator: Reviewed with patient ED AVS recommendations; education provided HTN; fall and safety  precautions; Life Alert; benefit of physical therapy; COVID 19 precautions; 24/7 Nurse Line Telephone number; ACM contact information; Advance Directives; My Chart; gaps in care; MWV;  Case Management and [facility] Health Planning and Support  services.  Patient verbalized understanding and states to appreciate phone call.  SDOH reveiewed with patient and reports no difficulty with food; transportation; or financial resources. No further questions or concerns voiced at this time.     Follow Up Outreach Due:Follow up as needed.     Hazel Newell RN  Ambulatory     2/23/2021, 11:09 EST

## 2021-02-26 ENCOUNTER — OFFICE VISIT (OUTPATIENT)
Dept: SPORTS MEDICINE | Facility: CLINIC | Age: 81
End: 2021-02-26

## 2021-02-26 VITALS
OXYGEN SATURATION: 98 % | WEIGHT: 130 LBS | DIASTOLIC BLOOD PRESSURE: 72 MMHG | BODY MASS INDEX: 22.2 KG/M2 | SYSTOLIC BLOOD PRESSURE: 138 MMHG | HEART RATE: 74 BPM | HEIGHT: 64 IN | TEMPERATURE: 95 F | RESPIRATION RATE: 14 BRPM

## 2021-02-26 DIAGNOSIS — M79.642 LEFT HAND PAIN: Primary | ICD-10-CM
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
## 2021-02-26 DIAGNOSIS — R20.0 NUMBNESS AND TINGLING OF BOTH FEET: ICD-10-CM

## 2021-02-26 DIAGNOSIS — R20.2 NUMBNESS AND TINGLING OF BOTH FEET: ICD-10-CM

## 2021-02-26 PROCEDURE — 99214 OFFICE O/P EST MOD 30 MIN: CPT | Performed by: FAMILY MEDICINE

## 2021-02-26 PROCEDURE — 73130 X-RAY EXAM OF HAND: CPT | Performed by: FAMILY MEDICINE

## 2021-03-02 LAB
ALBUMIN SERPL-MCNC: 4.3 G/DL (ref 3.5–5.2)
ALBUMIN/GLOB SERPL: 1.8 G/DL
ALP SERPL-CCNC: 90 U/L (ref 39–117)
ALT SERPL-CCNC: 24 U/L (ref 1–33)
ARSENIC BLD-MCNC: 8 UG/L (ref 2–23)
AST SERPL-CCNC: 25 U/L (ref 1–32)
BASOPHILS # BLD AUTO: 0.04 10*3/MM3 (ref 0–0.2)
BASOPHILS NFR BLD AUTO: 0.8 % (ref 0–1.5)
BILIRUB SERPL-MCNC: 0.3 MG/DL (ref 0–1.2)
BUN SERPL-MCNC: 22 MG/DL (ref 8–23)
BUN/CREAT SERPL: 36.7 (ref 7–25)
CADMIUM BLD-MCNC: <0.5 UG/L (ref 0–1.2)
CALCIUM SERPL-MCNC: 9.5 MG/DL (ref 8.6–10.5)
CHLORIDE SERPL-SCNC: 105 MMOL/L (ref 98–107)
CO2 SERPL-SCNC: 26.5 MMOL/L (ref 22–29)
CREAT SERPL-MCNC: 0.6 MG/DL (ref 0.57–1)
EOSINOPHIL # BLD AUTO: 0.1 10*3/MM3 (ref 0–0.4)
EOSINOPHIL NFR BLD AUTO: 2.1 % (ref 0.3–6.2)
ERYTHROCYTE [DISTWIDTH] IN BLOOD BY AUTOMATED COUNT: 11.9 % (ref 12.3–15.4)
GLOBULIN SER CALC-MCNC: 2.4 GM/DL
GLUCOSE SERPL-MCNC: 95 MG/DL (ref 65–99)
HCT VFR BLD AUTO: 37.5 % (ref 34–46.6)
HGB BLD-MCNC: 12.6 G/DL (ref 12–15.9)
IMM GRANULOCYTES # BLD AUTO: 0.01 10*3/MM3 (ref 0–0.05)
IMM GRANULOCYTES NFR BLD AUTO: 0.2 % (ref 0–0.5)
LEAD BLDV-MCNC: <1 UG/DL (ref 0–4)
LYMPHOCYTES # BLD AUTO: 1.55 10*3/MM3 (ref 0.7–3.1)
LYMPHOCYTES NFR BLD AUTO: 32.4 % (ref 19.6–45.3)
MCH RBC QN AUTO: 30.3 PG (ref 26.6–33)
MCHC RBC AUTO-ENTMCNC: 33.6 G/DL (ref 31.5–35.7)
MCV RBC AUTO: 90.1 FL (ref 79–97)
MERCURY BLD-MCNC: <1 UG/L (ref 0–14.9)
MONOCYTES # BLD AUTO: 0.35 10*3/MM3 (ref 0.1–0.9)
MONOCYTES NFR BLD AUTO: 7.3 % (ref 5–12)
NEUTROPHILS # BLD AUTO: 2.74 10*3/MM3 (ref 1.7–7)
NEUTROPHILS NFR BLD AUTO: 57.2 % (ref 42.7–76)
NRBC BLD AUTO-RTO: 0 /100 WBC (ref 0–0.2)
PLATELET # BLD AUTO: 214 10*3/MM3 (ref 140–450)
POTASSIUM SERPL-SCNC: 4.7 MMOL/L (ref 3.5–5.2)
PROT SERPL-MCNC: 6.7 G/DL (ref 6–8.5)
RBC # BLD AUTO: 4.16 10*6/MM3 (ref 3.77–5.28)
SODIUM SERPL-SCNC: 141 MMOL/L (ref 136–145)
TSH SERPL DL<=0.005 MIU/L-ACNC: 0.49 UIU/ML (ref 0.45–4.5)
VIT B12 SERPL-MCNC: 564 PG/ML (ref 211–946)
WBC # BLD AUTO: 4.79 10*3/MM3 (ref 3.4–10.8)

## 2021-03-03 NOTE — PROGRESS NOTES
Called patient in regards to blood work results. Informed her (Labs are normal with regard to numbness in your feet. Normal thyroid function, normal B12, normal CBC and metabolic panel, no heavy metals.  We should keep our plan for the nerve conduction study next) per Dr. Dao.

## 2021-03-04 DIAGNOSIS — I73.9 PERIPHERAL ARTERIAL DISEASE (HCC): ICD-10-CM

## 2021-03-04 RX ORDER — CILOSTAZOL 50 MG/1
50 TABLET ORAL
Qty: 180 TABLET | Refills: 3 | Status: SHIPPED | OUTPATIENT
Start: 2021-03-04 | End: 2021-06-18 | Stop reason: SDUPTHER

## 2021-03-08 DIAGNOSIS — E03.9 HYPOTHYROIDISM, UNSPECIFIED TYPE: ICD-10-CM

## 2021-03-08 RX ORDER — LEVOTHYROXINE SODIUM 0.07 MG/1
75 TABLET ORAL DAILY
Qty: 90 TABLET | Refills: 3 | Status: SHIPPED | OUTPATIENT
Start: 2021-03-08 | End: 2021-08-20 | Stop reason: SDUPTHER

## 2021-03-12 ENCOUNTER — PROCEDURE VISIT (OUTPATIENT)
Dept: NEUROLOGY | Facility: CLINIC | Age: 81
End: 2021-03-12

## 2021-03-12 VITALS — HEIGHT: 64 IN | WEIGHT: 130 LBS | BODY MASS INDEX: 22.2 KG/M2

## 2021-03-12 DIAGNOSIS — R20.2 NUMBNESS AND TINGLING OF BOTH FEET: ICD-10-CM

## 2021-03-12 DIAGNOSIS — R20.0 NUMBNESS AND TINGLING OF BOTH FEET: ICD-10-CM

## 2021-03-12 PROCEDURE — 95886 MUSC TEST DONE W/N TEST COMP: CPT | Performed by: PSYCHIATRY & NEUROLOGY

## 2021-03-12 PROCEDURE — 95909 NRV CNDJ TST 5-6 STUDIES: CPT | Performed by: PSYCHIATRY & NEUROLOGY

## 2021-03-12 NOTE — PROGRESS NOTES
EMG and Nerve Conduction Studies    I.      Instrument used: Neuromax 1002  II.     Please see data sheets for tabular summary of NCS and details on methods, temperatures and lab standards.   III.    EMG muscles tested for upper extremity studies include the deltoid, biceps, triceps, pronator teres, extensor digitorum communis, first dorsal interosseous and abductor pollicis brevis.    IV.   EMG muscles tested for lower extremity studies include the vastus lateralis, tibialis anterior, peroneus longus, medial gastrocnemius and extensor digitorum brevis.    V.    Additional muscles tested as needed.  Paraspinal muscles tested as needed.   VI.   Please see data sheets for tabular summary of EMG findings.   VII. The complete report includes the data sheets.      Indication: Pain numbness tingling both feet to just above the ankles  History: 80-year-old white female with pain, numbness and tingling in the feet just above the ankles.  She has history of claudication and she indicates some significant blockages of arteries in both legs.  Recent hemoglobin A1c was mildly elevated at 5.8% consistent with prediabetes.  History of hypothyroidism treated.      Ht: 162.6 cm  Wt: 59 kg; BMI 22.31  HbA1C:   Lab Results   Component Value Date    HGBA1C 5.80 (H) 11/23/2020     TSH:   Lab Results   Component Value Date    TSH 0.490 02/26/2021       Technical summary:  Nerve conduction studies were obtained in the left leg with 1 comparison on the right.  Skin temperatures are largely at least 32 °C.  For those nerves with temperature less than that temperature correction was not needed since the distal latencies were normal.  Needle examination was obtained on selected muscles in both legs.    Results:  1.  Normal left sural sensory study.  2.  Normal superficial peroneal sensory studies bilaterally.  3.  Normal left peroneal motor study.  4.  Normal left tibial motor study.  5.  Needle examination of selected muscles of both legs  showed normal insertional activities throughout.  There were normal motor units and recruitment patterns throughout with full or essentially full interference patterns in each muscle tested.    Impression:  Normal study.  No evidence of peripheral neuropathy or lumbosacral radiculopathy on either side by the study.  Study results were discussed with the patient.    Hunter Del Rio M.D.              Dictated utilizing Dragon dictation.

## 2021-03-25 ENCOUNTER — TELEPHONE (OUTPATIENT)
Dept: SPORTS MEDICINE | Facility: CLINIC | Age: 81
End: 2021-03-25

## 2021-03-25 NOTE — TELEPHONE ENCOUNTER
Patient called in, states she has lab orders from Dr. Harkins office that she wants to have done here at our office lab. Is this OK for her to come here for labs ordered from Dr. Castellanos?    Thank you  Mariam

## 2021-03-26 ENCOUNTER — LAB (OUTPATIENT)
Dept: SPORTS MEDICINE | Facility: CLINIC | Age: 81
End: 2021-03-26

## 2021-03-26 DIAGNOSIS — E55.9 VITAMIN D DEFICIENCY: ICD-10-CM

## 2021-03-26 DIAGNOSIS — M81.0 OSTEOPOROSIS, UNSPECIFIED OSTEOPOROSIS TYPE, UNSPECIFIED PATHOLOGICAL FRACTURE PRESENCE: Primary | ICD-10-CM

## 2021-03-26 NOTE — TELEPHONE ENCOUNTER
Please contact patient at your convenience to schedule a lab appointment with Rishabh. She has lab orders from Dr. Castellanos's office that she will need to bring with her so we can put them in when she gets here.     Thank you,  AMY Becerra

## 2021-03-27 LAB
25(OH)D3+25(OH)D2 SERPL-MCNC: 69 NG/ML (ref 30–100)
ALBUMIN SERPL-MCNC: 4.4 G/DL (ref 3.5–5.2)
ALBUMIN/GLOB SERPL: 1.9 G/DL
ALP SERPL-CCNC: 88 U/L (ref 39–117)
ALT SERPL-CCNC: 16 U/L (ref 1–33)
AST SERPL-CCNC: 16 U/L (ref 1–32)
BILIRUB SERPL-MCNC: 0.5 MG/DL (ref 0–1.2)
BUN SERPL-MCNC: 17 MG/DL (ref 8–23)
BUN/CREAT SERPL: 27 (ref 7–25)
CA-I SERPL ISE-MCNC: 5.3 MG/DL (ref 4.5–5.6)
CALCIUM SERPL-MCNC: 9.3 MG/DL (ref 8.6–10.5)
CHLORIDE SERPL-SCNC: 104 MMOL/L (ref 98–107)
CO2 SERPL-SCNC: 28.1 MMOL/L (ref 22–29)
CREAT SERPL-MCNC: 0.63 MG/DL (ref 0.57–1)
GLOBULIN SER CALC-MCNC: 2.3 GM/DL
GLUCOSE SERPL-MCNC: 93 MG/DL (ref 65–99)
POTASSIUM SERPL-SCNC: 4.1 MMOL/L (ref 3.5–5.2)
PROT SERPL-MCNC: 6.7 G/DL (ref 6–8.5)
PTH-INTACT SERPL-MCNC: 26 PG/ML (ref 15–65)
SODIUM SERPL-SCNC: 142 MMOL/L (ref 136–145)

## 2021-04-07 NOTE — PROGRESS NOTES
Attempted to contact patient via phone with no answer. Left a detailed voicemail stating labs came back normal per Dr. Dao. Voiced to return call to office with any questions or concerns.   -Mariam

## 2021-04-26 DIAGNOSIS — E55.9 VITAMIN D DEFICIENCY: ICD-10-CM

## 2021-04-26 RX ORDER — CHOLECALCIFEROL (VITAMIN D3) 1250 MCG
50000 CAPSULE ORAL
Qty: 6 CAPSULE | Refills: 3 | Status: SHIPPED | OUTPATIENT
Start: 2021-04-26 | End: 2022-02-25

## 2021-06-01 ENCOUNTER — HOSPITAL ENCOUNTER (OUTPATIENT)
Dept: GENERAL RADIOLOGY | Facility: HOSPITAL | Age: 81
Discharge: HOME | End: 2021-06-01
Admitting: INTERNAL MEDICINE

## 2021-06-01 ENCOUNTER — TRANSCRIBE ORDERS (OUTPATIENT)
Dept: GENERAL RADIOLOGY | Facility: HOSPITAL | Age: 81
End: 2021-06-01

## 2021-06-01 DIAGNOSIS — M79.671 RIGHT FOOT PAIN: Primary | ICD-10-CM

## 2021-06-01 DIAGNOSIS — M79.671 RIGHT FOOT PAIN: ICD-10-CM

## 2021-06-01 PROCEDURE — 73630 X-RAY EXAM OF FOOT: CPT

## 2021-06-18 DIAGNOSIS — I73.9 PERIPHERAL ARTERIAL DISEASE (HCC): ICD-10-CM

## 2021-06-18 RX ORDER — CILOSTAZOL 50 MG/1
50 TABLET ORAL
Qty: 30 TABLET | Refills: 0 | Status: SHIPPED | OUTPATIENT
Start: 2021-06-18 | End: 2021-08-20 | Stop reason: SDUPTHER

## 2021-07-04 ENCOUNTER — APPOINTMENT (OUTPATIENT)
Dept: GENERAL RADIOLOGY | Facility: HOSPITAL | Age: 81
End: 2021-07-04

## 2021-07-04 PROCEDURE — 73130 X-RAY EXAM OF HAND: CPT | Performed by: FAMILY MEDICINE

## 2021-08-20 DIAGNOSIS — E03.9 HYPOTHYROIDISM, UNSPECIFIED TYPE: ICD-10-CM

## 2021-08-20 DIAGNOSIS — I73.9 PERIPHERAL ARTERIAL DISEASE (HCC): ICD-10-CM

## 2021-08-20 RX ORDER — CILOSTAZOL 50 MG/1
50 TABLET ORAL
Qty: 30 TABLET | Refills: 6 | Status: SHIPPED | OUTPATIENT
Start: 2021-08-20 | End: 2022-04-21 | Stop reason: SDUPTHER

## 2021-08-20 RX ORDER — LEVOTHYROXINE SODIUM 0.07 MG/1
75 TABLET ORAL DAILY
Qty: 90 TABLET | Refills: 3 | Status: SHIPPED | OUTPATIENT
Start: 2021-08-20 | End: 2022-04-21 | Stop reason: SDUPTHER

## 2021-09-08 ENCOUNTER — TELEPHONE (OUTPATIENT)
Dept: SPORTS MEDICINE | Facility: CLINIC | Age: 81
End: 2021-09-08

## 2021-09-17 ENCOUNTER — TELEPHONE (OUTPATIENT)
Dept: SPORTS MEDICINE | Facility: CLINIC | Age: 81
End: 2021-09-17

## 2021-09-17 ENCOUNTER — OFFICE VISIT (OUTPATIENT)
Dept: SPORTS MEDICINE | Facility: CLINIC | Age: 81
End: 2021-09-17

## 2021-09-17 VITALS
HEART RATE: 71 BPM | WEIGHT: 128 LBS | OXYGEN SATURATION: 96 % | SYSTOLIC BLOOD PRESSURE: 156 MMHG | BODY MASS INDEX: 21.85 KG/M2 | DIASTOLIC BLOOD PRESSURE: 84 MMHG | TEMPERATURE: 97.6 F | HEIGHT: 64 IN

## 2021-09-17 DIAGNOSIS — R29.6 FALLS FREQUENTLY: Primary | ICD-10-CM

## 2021-09-17 DIAGNOSIS — R26.89 BALANCE PROBLEM: ICD-10-CM

## 2021-09-17 PROCEDURE — 99213 OFFICE O/P EST LOW 20 MIN: CPT | Performed by: FAMILY MEDICINE

## 2021-09-17 RX ORDER — NAPROXEN 250 MG/1
TABLET ORAL
COMMUNITY
Start: 2021-07-05 | End: 2021-09-17

## 2021-09-17 NOTE — TELEPHONE ENCOUNTER
Patient would like to see Av at [facility] off of [facility] if possible.   Please advise, thank you.

## 2021-09-17 NOTE — PROGRESS NOTES
"Jael is a 81 y.o. year old female    Chief Complaint   Patient presents with   • Night Sweats     x 3 weeks ago - says they have gone away since made appointment - says they are minor now       History of Present Illness   HPI   Fatigue and falls over the past several months - rather sudden, usually to the right  No dizziness    Night sweats recently, resolved for the past week.    Review of Systems    /84   Pulse 71   Temp 97.6 °F (36.4 °C)   Ht 162.6 cm (64\")   Wt 58.1 kg (128 lb)   SpO2 96%   BMI 21.97 kg/m²          Physical Exam  Vitals reviewed.   Constitutional:       Appearance: She is well-developed.   Cardiovascular:      Rate and Rhythm: Normal rate and regular rhythm.      Heart sounds: Normal heart sounds.   Pulmonary:      Effort: Pulmonary effort is normal.      Breath sounds: Normal breath sounds.   Musculoskeletal:      Cervical back: Normal range of motion.   Skin:     General: Skin is warm and dry.   Neurological:      Mental Status: She is alert and oriented to person, place, and time.           Current Outpatient Medications:   •  aspirin 81 MG EC tablet, Take 1 tablet by mouth nightly., Disp: , Rfl:   •  carboxymethylcellulose (REFRESH PLUS) 0.5 % solution, 1 drop 2 (Two) Times a Day As Needed for Dry Eyes., Disp: , Rfl:   •  Cholecalciferol (Vitamin D3) 1.25 MG (56814 UT) capsule, Take 1 capsule by mouth Every 7 (Seven) Days., Disp: 6 capsule, Rfl: 3  •  cilostazol (PLETAL) 50 MG tablet, Take 1 tablet by mouth 2 (Two) Times a Day Before Meals., Disp: 30 tablet, Rfl: 6  •  ketoconazole (NIZORAL) 2 % shampoo, USE EVERY OTHER DAY LATHER LEAVE IN PLACE FOR 2 5 MINUTES AND THEN RINSE OFF WITH WATER, Disp: , Rfl: 3  •  levothyroxine (SYNTHROID, LEVOTHROID) 75 MCG tablet, Take 1 tablet by mouth Daily., Disp: 90 tablet, Rfl: 3  •  Multiple Vitamin (MULTI-VITAMIN) tablet, Take by mouth., Disp: , Rfl:      Diagnoses and all orders for this visit:    Falls frequently  -     Ambulatory " Referral to Physical Therapy Evaluate and treat, Vestibular    Balance problem  -     Ambulatory Referral to Physical Therapy Evaluate and treat, Vestibular    Other orders  -     Discontinue: naproxen (NAPROSYN) 250 MG tablet; TAKE 1 TABLET BY MOUTH TWICE DAILY AS NEEDED FOR MILD PAIN FOR UP TO 7 DAYS    Negative orthostatic vital signs.  Unclear etiology.  Consider advanced imaging, but this seems more vestibular than anything else.  We will start with some vestibular therapy and follow her response from there.        EMR Dragon/Transcription disclaimer:    Much of this encounter note is an electronic transcription/translation of spoken language to printed text.  The electronic translation of spoken language may permit erroneous, or at times, nonsensical words or phrases to be inadvertently transcribed.  Although I have reviewed the note for such errors some may still exist.

## 2021-09-17 NOTE — TELEPHONE ENCOUNTER
Patient would like to know if you can place an order for her to have a mammogram done.   She stated she is overdue and she would like to go to [facility].   Please advise, thank you.

## 2021-09-22 DIAGNOSIS — Z12.31 ENCOUNTER FOR SCREENING MAMMOGRAM FOR MALIGNANT NEOPLASM OF BREAST: Primary | ICD-10-CM

## 2021-09-22 NOTE — TELEPHONE ENCOUNTER
I have called and notified patient that referral has been sent to [facility] as she requested. She stated that she would call and schedule at her convenience.

## 2021-10-22 ENCOUNTER — APPOINTMENT (OUTPATIENT)
Dept: GENERAL RADIOLOGY | Facility: HOSPITAL | Age: 81
End: 2021-10-22

## 2021-10-22 PROCEDURE — 73130 X-RAY EXAM OF HAND: CPT | Performed by: GENERAL PRACTICE

## 2021-10-26 ENCOUNTER — TELEPHONE (OUTPATIENT)
Dept: SPORTS MEDICINE | Facility: CLINIC | Age: 81
End: 2021-10-26

## 2021-10-26 NOTE — TELEPHONE ENCOUNTER
Patient called requesting a new referral to physical therapy for neck and bilateral shoulders.     [facility]   F: [phone]    Patient would like to know what you suggest for night sweats.     Please advise,

## 2021-10-29 DIAGNOSIS — G89.29 CHRONIC PAIN OF BOTH SHOULDERS: ICD-10-CM

## 2021-10-29 DIAGNOSIS — M54.2 NECK PAIN: Primary | ICD-10-CM

## 2021-10-29 DIAGNOSIS — M25.511 CHRONIC PAIN OF BOTH SHOULDERS: ICD-10-CM

## 2021-10-29 DIAGNOSIS — M25.512 CHRONIC PAIN OF BOTH SHOULDERS: ICD-10-CM

## 2021-10-29 NOTE — TELEPHONE ENCOUNTER
[facility] called and states that they were unable to see the patient today due to her not having a referral for PT for her neck and shoulders; they provided me with a phone number for a call back if we have any questions: [phone] and fax number is [phone]       Please advise, thank you!

## 2021-10-29 NOTE — TELEPHONE ENCOUNTER
PT order in chart.   Regarding her night sweats, we should discuss at her next visit. I don't have much to offer without knowing the cause; we could consider trying an antidepressant that could help those

## 2021-11-10 ENCOUNTER — CLINICAL SUPPORT (OUTPATIENT)
Dept: SPORTS MEDICINE | Facility: CLINIC | Age: 81
End: 2021-11-10

## 2021-11-10 DIAGNOSIS — Z23 NEED FOR INFLUENZA VACCINATION: Primary | ICD-10-CM

## 2021-11-10 PROCEDURE — 90662 IIV NO PRSV INCREASED AG IM: CPT | Performed by: FAMILY MEDICINE

## 2021-11-10 PROCEDURE — G0008 ADMIN INFLUENZA VIRUS VAC: HCPCS | Performed by: FAMILY MEDICINE

## 2021-11-17 NOTE — PROGRESS NOTES
Physical Therapy Daily Progress Note    Subjective     Jael Navarro reports: overall doing much better, still with intermittent pain but no more than 2/10-3/10      Objective   See Exercise, Manual, and Modality Logs for complete treatment.   Please see documentation completed by Chanda Chapman PT, DPT for dry needling treatment performed today    Assessment/Plan  Fatigued easily with exercise, and requires cueing to ensure proper form and posture.    Progress per Plan of Care           Manual Therapy:    0     mins  20422;  Therapeutic Exercise:    25     mins  93161;     Neuromuscular Homa:    0    mins  17863;    Therapeutic Activity:     0     mins  03994;     Gait Trainin     mins  82960;     Ultrasound:     0     mins  81573;    Electrical Stimulation:    0     mins  64789 ( );    Timed Treatment:   25   mins   Total Treatment:     50   mins    Jazzy Holcomb PT, DPT  Physical Therapist  [place] License #[id number]                     Detail Level: Simple

## 2022-01-12 ENCOUNTER — TELEPHONE (OUTPATIENT)
Dept: SPORTS MEDICINE | Facility: CLINIC | Age: 82
End: 2022-01-12

## 2022-01-12 NOTE — TELEPHONE ENCOUNTER
Caller: LUKE ALMONTE     Relationship: SELF     Best call back number: [phone]     What is the medical concern/diagnosis: NECK PAIN     What specialty or service is being requested: PHYSICAL THERAPY     What is the provider, practice or medical service name:     What is the office location: [facility]     What is the office phone number: [phone]     Any additional details: PATIENT STATES SHE HAS HURT THERE NECK AND WANTS TO GO TO PT, SHE STATES THEY HAVE AN OPENING AT 8:15 Monday AND SHE IS SCHEDULED BUT NEEDS AN UPDATED REFERRAL

## 2022-01-13 DIAGNOSIS — M50.30 DEGENERATION OF INTERVERTEBRAL DISC OF CERVICAL REGION: Primary | ICD-10-CM

## 2022-01-18 ENCOUNTER — TELEPHONE (OUTPATIENT)
Dept: SPORTS MEDICINE | Facility: CLINIC | Age: 82
End: 2022-01-18

## 2022-01-18 NOTE — TELEPHONE ENCOUNTER
Caller: Jael Navarro    Relationship to patient: Self    Best call back number: [phone]    Patient is needing: PATIENT NEEDS A CALL BACK REGARDING A PHONE MESSAGE SHE GOT STATING SHE CAN NO LONGER BE SEEN AT THIS PRACTICE.  SHE WOULD LIKE SOMEONE TO CLARIFY WHAT THAT MEANS.

## 2022-01-18 NOTE — TELEPHONE ENCOUNTER
I spoke with this patient personally.   I assured her I could not find anything in her chart to indicate that any provider from [facility] sent her a message saying she could not be a patient.       I suggested she show the message to her son or daughter or someone who could help her understand for sure what the message is saying.      I did attempt to call her son, but got his voicemail and did not want to leave a confusing message and alarm him.     sandraw

## 2022-02-21 ENCOUNTER — TELEPHONE (OUTPATIENT)
Dept: SPORTS MEDICINE | Facility: CLINIC | Age: 82
End: 2022-02-21

## 2022-02-21 NOTE — TELEPHONE ENCOUNTER
Patient called in and left a voicemail in regards to her upcoming visit on 02/25/2022.   She has stated she has a hamstring tear on the RT side, has been using a cane, informed by several people she knows that this is a new problem for her. Wants to know if this can be evaluated at her visit, or will she need a separate visit to evaluate this issue ??    Thank you   Mariam

## 2022-02-21 NOTE — TELEPHONE ENCOUNTER
I can evaluate this problem but I cannot evaluate at the same time as her AWV, okay to work in sooner if needed

## 2022-02-21 NOTE — TELEPHONE ENCOUNTER
Attempted to contact patient via phone with no answer, left a detailed voicemail with response and recommendations per Dr. Dao. Advised to keep AWV visit and to contact our office to schedule separate visit for hamstring eval.     Thanks  Mariam

## 2022-02-25 ENCOUNTER — OFFICE VISIT (OUTPATIENT)
Dept: SPORTS MEDICINE | Facility: CLINIC | Age: 82
End: 2022-02-25

## 2022-02-25 VITALS
BODY MASS INDEX: 21.33 KG/M2 | HEIGHT: 65 IN | HEART RATE: 88 BPM | DIASTOLIC BLOOD PRESSURE: 70 MMHG | SYSTOLIC BLOOD PRESSURE: 128 MMHG | RESPIRATION RATE: 16 BRPM | TEMPERATURE: 97.8 F | WEIGHT: 128 LBS | OXYGEN SATURATION: 99 %

## 2022-02-25 DIAGNOSIS — S76.311A HAMSTRING STRAIN, RIGHT, INITIAL ENCOUNTER: ICD-10-CM

## 2022-02-25 DIAGNOSIS — E03.9 ACQUIRED HYPOTHYROIDISM: ICD-10-CM

## 2022-02-25 DIAGNOSIS — Z00.00 MEDICARE ANNUAL WELLNESS VISIT, SUBSEQUENT: Primary | ICD-10-CM

## 2022-02-25 DIAGNOSIS — I73.9 PERIPHERAL ARTERIAL DISEASE: ICD-10-CM

## 2022-02-25 DIAGNOSIS — M25.551 RIGHT HIP PAIN: ICD-10-CM

## 2022-02-25 PROCEDURE — 96160 PT-FOCUSED HLTH RISK ASSMT: CPT | Performed by: FAMILY MEDICINE

## 2022-02-25 PROCEDURE — 1125F AMNT PAIN NOTED PAIN PRSNT: CPT | Performed by: FAMILY MEDICINE

## 2022-02-25 PROCEDURE — 1159F MED LIST DOCD IN RCRD: CPT | Performed by: FAMILY MEDICINE

## 2022-02-25 PROCEDURE — G0439 PPPS, SUBSEQ VISIT: HCPCS | Performed by: FAMILY MEDICINE

## 2022-02-25 PROCEDURE — 99397 PER PM REEVAL EST PAT 65+ YR: CPT | Performed by: FAMILY MEDICINE

## 2022-04-21 DIAGNOSIS — E03.9 HYPOTHYROIDISM, UNSPECIFIED TYPE: ICD-10-CM

## 2022-04-21 DIAGNOSIS — I73.9 PERIPHERAL ARTERIAL DISEASE: ICD-10-CM

## 2022-04-21 RX ORDER — CILOSTAZOL 50 MG/1
50 TABLET ORAL
Qty: 180 TABLET | Refills: 3 | Status: SHIPPED | OUTPATIENT
Start: 2022-04-21

## 2022-04-21 RX ORDER — LEVOTHYROXINE SODIUM 0.07 MG/1
75 TABLET ORAL DAILY
Qty: 90 TABLET | Refills: 3 | Status: SHIPPED | OUTPATIENT
Start: 2022-04-21 | End: 2022-08-04

## 2022-04-29 ENCOUNTER — TELEPHONE (OUTPATIENT)
Dept: SPORTS MEDICINE | Facility: CLINIC | Age: 82
End: 2022-04-29

## 2022-04-29 ENCOUNTER — OFFICE VISIT (OUTPATIENT)
Dept: SPORTS MEDICINE | Facility: CLINIC | Age: 82
End: 2022-04-29

## 2022-04-29 DIAGNOSIS — U07.1 COVID-19 VIRUS INFECTION: Primary | ICD-10-CM

## 2022-04-29 PROCEDURE — 99441 PR PHYS/QHP TELEPHONE EVALUATION 5-10 MIN: CPT | Performed by: FAMILY MEDICINE

## 2022-04-29 RX ORDER — DEXAMETHASONE 4 MG/1
4 TABLET ORAL 2 TIMES DAILY WITH MEALS
Qty: 10 TABLET | Refills: 0 | Status: SHIPPED | OUTPATIENT
Start: 2022-04-29 | End: 2022-05-24

## 2022-04-29 RX ORDER — GUAIFENESIN AND CODEINE PHOSPHATE 100; 10 MG/5ML; MG/5ML
SOLUTION ORAL
Qty: 180 ML | Refills: 0 | Status: SHIPPED | OUTPATIENT
Start: 2022-04-29 | End: 2022-05-24

## 2022-04-29 NOTE — PROGRESS NOTES
Telephone Visit Note    Chief Complaint   Patient presents with   • Covid-19 Home Monitoring Phone Call     Tested positive today 04/29/2022 - needing advice on what she should do and discuss antibody infusion -  visit on 04/27/2022          History of Present Illness  Started feeling sick about 10 days ago - cough and congestion, sore throat. Intermittent chills, diffuse myalgias, fatigue.       Diagnoses and all orders for this visit:    COVID-19 virus infection  -     dexamethasone (DECADRON) 4 MG tablet; Take 1 tablet by mouth 2 (Two) Times a Day With Meals.  -     guaiFENesin-codeine (GUAIFENESIN AC) 100-10 MG/5ML liquid; Take 5-10mL q 6 hours prn cough    Unfortunately too far out for antiviral treatments. Discussed symptomatic treatment. Vitals reassuring at  2 days ago.       This patient was evaluated by telephone due to current precautions with COVID-19.  Consent to telephone visit for this problem was given by the patient. I spent a total of 10 minutes on the phone with the patient.

## 2022-05-23 ENCOUNTER — TELEPHONE (OUTPATIENT)
Dept: SPORTS MEDICINE | Facility: CLINIC | Age: 82
End: 2022-05-23

## 2022-05-23 NOTE — TELEPHONE ENCOUNTER
DR RHOADES PATIENT: REQUESTS CALL BACK FROM DR RHOADES 05/23/2022 PATIENT HAS BEEN QUARANTINED SINCE April FOR COVID AND STILL HAS LARYNGITIS AND IS REALLY HOARS,E HER THROAT ISN'T HURTING/.SORE - THAT IS CLEARED UP.  NW IS EXPERIENCING NIGHT SWEATS AND WOULD LIKE TO DISCUSS THIS WITH DR RHOADES.  PATIENT WAS ADVISED SOMEONE WOULD CALL HER BACK W/I 24/48 HOURS.

## 2022-05-24 ENCOUNTER — OFFICE VISIT (OUTPATIENT)
Dept: SPORTS MEDICINE | Facility: CLINIC | Age: 82
End: 2022-05-24

## 2022-05-24 DIAGNOSIS — U07.1 ONGOING SYMPTOMATIC DISEASE DUE TO COVID-19 VIRUS: Primary | ICD-10-CM

## 2022-05-24 DIAGNOSIS — R61 NIGHT SWEATS: ICD-10-CM

## 2022-05-24 DIAGNOSIS — R42 VERTIGO: ICD-10-CM

## 2022-05-24 PROCEDURE — 99441 PR PHYS/QHP TELEPHONE EVALUATION 5-10 MIN: CPT | Performed by: FAMILY MEDICINE

## 2022-05-24 NOTE — PROGRESS NOTES
Telephone Visit Note    Chief Complaint   Patient presents with   • Covid-19 Home Monitoring Phone Call     Patient states that she still having COVID-19 symptoms and future vaccine questions as well         History of Present Illness  C/o ongoing symptoms possibly from covid.     Night sweats started last week. No fever. Disturbing sleep. Had these previously, worked up, and resolved. No daytime sweats.     Also reports episodes of vision blacking out and feels like swimming sensation when lying supine. Episodes last a few seconds then resolve. Began in the past week or so.     Fatigue, some laryngitis, no cough or shortness of breath.     Diagnoses and all orders for this visit:    Ongoing symptomatic disease due to COVID-19 virus    Night sweats    Vertigo  -     Ambulatory Referral to Physical Therapy Evaluate and treat, Vestibular    Start otc allegra for possible allergy element.   Vestibular therapy  If night sweats don't resolve with allergy treatment and another week then restart workup from 2020      This patient was evaluated by telephone due to current precautions with COVID-19.  Consent to telephone visit for this problem was given by the patient. I spent a total of 9 minutes on the phone with the patient.

## 2022-06-03 ENCOUNTER — APPOINTMENT (OUTPATIENT)
Dept: CARDIOLOGY | Facility: HOSPITAL | Age: 82
End: 2022-06-03

## 2022-06-03 ENCOUNTER — HOSPITAL ENCOUNTER (EMERGENCY)
Facility: HOSPITAL | Age: 82
Discharge: HOME | End: 2022-06-03
Attending: EMERGENCY MEDICINE | Admitting: EMERGENCY MEDICINE

## 2022-06-03 VITALS
TEMPERATURE: 97.6 F | SYSTOLIC BLOOD PRESSURE: 167 MMHG | HEART RATE: 70 BPM | RESPIRATION RATE: 18 BRPM | DIASTOLIC BLOOD PRESSURE: 72 MMHG | OXYGEN SATURATION: 99 %

## 2022-06-03 DIAGNOSIS — M79.604 MUSCULOSKELETAL PAIN OF RIGHT LOWER EXTREMITY: Primary | ICD-10-CM

## 2022-06-03 LAB
ALBUMIN SERPL-MCNC: 4.4 G/DL (ref 3.5–5.2)
ALBUMIN/GLOB SERPL: 1.9 G/DL
ALP SERPL-CCNC: 96 U/L (ref 39–117)
ALT SERPL W P-5'-P-CCNC: 20 U/L (ref 1–33)
ANION GAP SERPL CALCULATED.3IONS-SCNC: 10.5 MMOL/L (ref 5–15)
AST SERPL-CCNC: 19 U/L (ref 1–32)
BASOPHILS # BLD AUTO: 0.02 10*3/MM3 (ref 0–0.2)
BASOPHILS NFR BLD AUTO: 0.3 % (ref 0–1.5)
BH CV LOWER VASCULAR LEFT COMMON FEMORAL AUGMENT: NORMAL
BH CV LOWER VASCULAR LEFT COMMON FEMORAL COMPETENT: NORMAL
BH CV LOWER VASCULAR LEFT COMMON FEMORAL COMPRESS: NORMAL
BH CV LOWER VASCULAR LEFT COMMON FEMORAL PHASIC: NORMAL
BH CV LOWER VASCULAR LEFT COMMON FEMORAL SPONT: NORMAL
BH CV LOWER VASCULAR RIGHT COMMON FEMORAL AUGMENT: NORMAL
BH CV LOWER VASCULAR RIGHT COMMON FEMORAL COMPETENT: NORMAL
BH CV LOWER VASCULAR RIGHT COMMON FEMORAL COMPRESS: NORMAL
BH CV LOWER VASCULAR RIGHT COMMON FEMORAL PHASIC: NORMAL
BH CV LOWER VASCULAR RIGHT COMMON FEMORAL SPONT: NORMAL
BH CV LOWER VASCULAR RIGHT DISTAL FEMORAL COMPRESS: NORMAL
BH CV LOWER VASCULAR RIGHT GASTRONEMIUS COMPRESS: NORMAL
BH CV LOWER VASCULAR RIGHT GREATER SAPH AK COMPRESS: NORMAL
BH CV LOWER VASCULAR RIGHT GREATER SAPH BK COMPRESS: NORMAL
BH CV LOWER VASCULAR RIGHT LESSER SAPH COMPRESS: NORMAL
BH CV LOWER VASCULAR RIGHT MID FEMORAL AUGMENT: NORMAL
BH CV LOWER VASCULAR RIGHT MID FEMORAL COMPETENT: NORMAL
BH CV LOWER VASCULAR RIGHT MID FEMORAL COMPRESS: NORMAL
BH CV LOWER VASCULAR RIGHT MID FEMORAL PHASIC: NORMAL
BH CV LOWER VASCULAR RIGHT MID FEMORAL SPONT: NORMAL
BH CV LOWER VASCULAR RIGHT PERONEAL COMPRESS: NORMAL
BH CV LOWER VASCULAR RIGHT POPLITEAL AUGMENT: NORMAL
BH CV LOWER VASCULAR RIGHT POPLITEAL COMPETENT: NORMAL
BH CV LOWER VASCULAR RIGHT POPLITEAL COMPRESS: NORMAL
BH CV LOWER VASCULAR RIGHT POPLITEAL PHASIC: NORMAL
BH CV LOWER VASCULAR RIGHT POPLITEAL SPONT: NORMAL
BH CV LOWER VASCULAR RIGHT POSTERIOR TIBIAL COMPRESS: NORMAL
BH CV LOWER VASCULAR RIGHT PROFUNDA FEMORAL COMPRESS: NORMAL
BH CV LOWER VASCULAR RIGHT PROXIMAL FEMORAL COMPRESS: NORMAL
BH CV LOWER VASCULAR RIGHT SAPHENOFEMORAL JUNCTION COMPRESS: NORMAL
BILIRUB SERPL-MCNC: 0.5 MG/DL (ref 0–1.2)
BUN SERPL-MCNC: 15 MG/DL (ref 8–23)
BUN/CREAT SERPL: 22.1 (ref 7–25)
CALCIUM SPEC-SCNC: 9.3 MG/DL (ref 8.6–10.5)
CHLORIDE SERPL-SCNC: 107 MMOL/L (ref 98–107)
CO2 SERPL-SCNC: 23.5 MMOL/L (ref 22–29)
CREAT SERPL-MCNC: 0.68 MG/DL (ref 0.57–1)
DEPRECATED RDW RBC AUTO: 40.1 FL (ref 37–54)
EGFRCR SERPLBLD CKD-EPI 2021: 86.5 ML/MIN/1.73
EOSINOPHIL # BLD AUTO: 0.05 10*3/MM3 (ref 0–0.4)
EOSINOPHIL NFR BLD AUTO: 0.7 % (ref 0.3–6.2)
ERYTHROCYTE [DISTWIDTH] IN BLOOD BY AUTOMATED COUNT: 12 % (ref 12.3–15.4)
GLOBULIN UR ELPH-MCNC: 2.3 GM/DL
GLUCOSE SERPL-MCNC: 96 MG/DL (ref 65–99)
HCT VFR BLD AUTO: 37.2 % (ref 34–46.6)
HGB BLD-MCNC: 12.5 G/DL (ref 12–15.9)
HOLD SPECIMEN: NORMAL
LYMPHOCYTES # BLD AUTO: 2.06 10*3/MM3 (ref 0.7–3.1)
LYMPHOCYTES NFR BLD AUTO: 29.3 % (ref 19.6–45.3)
MAXIMAL PREDICTED HEART RATE: 137 BPM
MCH RBC QN AUTO: 30.7 PG (ref 26.6–33)
MCHC RBC AUTO-ENTMCNC: 33.6 G/DL (ref 31.5–35.7)
MCV RBC AUTO: 91.4 FL (ref 79–97)
MONOCYTES # BLD AUTO: 0.5 10*3/MM3 (ref 0.1–0.9)
MONOCYTES NFR BLD AUTO: 7.1 % (ref 5–12)
NEUTROPHILS NFR BLD AUTO: 4.4 10*3/MM3 (ref 1.7–7)
NEUTROPHILS NFR BLD AUTO: 62.5 % (ref 42.7–76)
PLATELET # BLD AUTO: 238 10*3/MM3 (ref 140–450)
PMV BLD AUTO: 9.1 FL (ref 6–12)
POTASSIUM SERPL-SCNC: 4.1 MMOL/L (ref 3.5–5.2)
PROT SERPL-MCNC: 6.7 G/DL (ref 6–8.5)
RBC # BLD AUTO: 4.07 10*6/MM3 (ref 3.77–5.28)
SODIUM SERPL-SCNC: 141 MMOL/L (ref 136–145)
STRESS TARGET HR: 116 BPM
WBC NRBC COR # BLD: 7.04 10*3/MM3 (ref 3.4–10.8)
WHOLE BLOOD HOLD COAG: NORMAL
WHOLE BLOOD HOLD SPECIMEN: NORMAL

## 2022-06-03 PROCEDURE — 36415 COLL VENOUS BLD VENIPUNCTURE: CPT

## 2022-06-03 PROCEDURE — 93971 EXTREMITY STUDY: CPT

## 2022-06-03 PROCEDURE — 85025 COMPLETE CBC W/AUTO DIFF WBC: CPT | Performed by: EMERGENCY MEDICINE

## 2022-06-03 PROCEDURE — 80053 COMPREHEN METABOLIC PANEL: CPT | Performed by: EMERGENCY MEDICINE

## 2022-06-03 PROCEDURE — 99283 EMERGENCY DEPT VISIT LOW MDM: CPT

## 2022-06-03 PROCEDURE — 99282 EMERGENCY DEPT VISIT SF MDM: CPT

## 2022-06-03 NOTE — ED PROVIDER NOTES
EMERGENCY DEPARTMENT ENCOUNTER    Room Number:  21/21  Date of encounter:  6/4/2022  PCP: Keaton Dao MD  Historian: Patient      HPI:  Chief Complaint: Leg swelling and pain  A complete HPI/ROS/PMH/PSH/SH/FH are unobtainable due to: None    Context: Jael Navarro is a 81 y.o. female who presents to the ED c/o awakening this morning with swelling and pain to her right leg.  States that initially was posterior leg states it is now moved up to her posterior lateral and medial knee.  Denies any injury no redness or swelling no tingling.  Said the pain is only present with weightbearing not present with rest.  States pain has improved since she has been in the ER.      PAST MEDICAL HISTORY  Active Ambulatory Problems     Diagnosis Date Noted   • Hypothyroidism 07/20/2016   • Atopic rhinitis 07/21/2016   • Allergy to dog dander 07/21/2016   • Anxiety 07/21/2016   • Allergy to cats 07/21/2016   • Cataract 07/21/2016   • Degeneration of intervertebral disc of cervical region 07/21/2016   • Fibromyalgia 07/21/2016   • Hyperlipidemia 07/21/2016   • Essential hypertension 07/21/2016   • Impaired glucose tolerance 07/21/2016   • Bladder prolapse, female, acquired 07/21/2016   • Rosacea 07/21/2016   • Urinary incontinence 07/21/2016   • Vitamin D deficiency 07/21/2016   • Macular degeneration of both eyes 08/29/2018   • Peripheral arterial disease (HCC) 04/02/2019     Resolved Ambulatory Problems     Diagnosis Date Noted   • Gastroesophageal reflux disease 07/21/2016   • Microscopic hematuria 07/21/2016   • LLQ pain 03/29/2017   • Acute cystitis without hematuria 12/12/2017   • Acute cystitis 12/12/2017     Past Medical History:   Diagnosis Date   • Allergic rhinitis    • Degeneration, intervertebral disc, thoracic    • Disease of thyroid gland    • Fracture of ankle    • Gingival and periodontal disease    • Hypertension    • Irritable bowel syndrome    • Knee fracture    • Wrist fracture          PAST SURGICAL  HISTORY  Past Surgical History:   Procedure Laterality Date   • ABDOMINOPLASTY     • APPENDECTOMY     • CATARACT EXTRACTION     • COLONOSCOPY  unknown   • COLONOSCOPY N/A 3/29/2017    Procedure: COLONOSCOPY TO CECUM WITH COLD BIOPSIES;  Surgeon: Kyle Hidalgo MD;  Location: [facility] ENDOSCOPY;  Service:    • DILATATION AND CURETTAGE     • EXCISION BENIGN SKIN LESION SCALP / NECK / HANDS / FEET / GENITALIA      scalp-benign   • HAND SURGERY      mass between thumb and forefinger   • HYSTERECTOMY           FAMILY HISTORY  Family History   Problem Relation Age of Onset   • Heart attack Mother    • Alzheimer's disease Mother    • Glaucoma Mother    • Heart disease Mother    • Hypertension Mother    • Thyroid disease Mother    • Heart disease Father    • Alzheimer's disease Sister    • Anxiety disorder Daughter    • Bipolar disorder Daughter    • Depression Daughter    • Stroke Daughter    • Colon polyps Daughter    • Heart attack Maternal Grandmother    • Breast cancer Other    • Lung cancer Other    • Ovarian cancer Other    • Diabetes Other    • Heart disease Other    • Diabetes Cousin    • Emphysema Cousin    • Heart disease Cousin    • Multiple sclerosis Cousin    • Heart disease Other    • Nephrolithiasis Other          SOCIAL HISTORY  Social History     Socioeconomic History   • Marital status:    Tobacco Use   • Smoking status: Never Smoker   • Smokeless tobacco: Never Used   Vaping Use   • Vaping Use: Never used   Substance and Sexual Activity   • Alcohol use: No   • Drug use: No   • Sexual activity: Defer         ALLERGIES  Iodinated diagnostic agents, Other, Iodine, and Sulfa antibiotics        REVIEW OF SYSTEMS  Review of Systems     All systems reviewed and negative except for those discussed in HPI.       PHYSICAL EXAM    I have reviewed the triage vital signs and nursing notes.    ED Triage Vitals   Temp Heart Rate Resp BP SpO2   06/03/22 1545 06/03/22 1542 06/03/22 1542 06/03/22 1545 06/03/22  1542   97.6 °F (36.4 °C) 100 16 (!) 182/78 98 %      Temp src Heart Rate Source Patient Position BP Location FiO2 (%)   06/03/22 1545 -- 06/03/22 1545 06/03/22 1545 --   Tympanic  Sitting Right arm        Physical Exam  GENERAL: not distressed  HENT: nares patent  EYES: no scleral icterus  CV: regular rhythm, regular rate  RESPIRATORY: normal effort  ABDOMEN: soft  MUSCULOSKELETAL: no deformity, mild lateral medial and posterior right knee tenderness there is no erythema no warmth no signs of infection neurovascular intact distally distal right leg is benign to exam  NEURO: alert, moves all extremities, follows commands  SKIN: warm, dry        LAB RESULTS  Recent Results (from the past 24 hour(s))   Comprehensive Metabolic Panel    Collection Time: 06/03/22  4:24 PM    Specimen: Blood   Result Value Ref Range    Glucose 96 65 - 99 mg/dL    BUN 15 8 - 23 mg/dL    Creatinine 0.68 0.57 - 1.00 mg/dL    Sodium 141 136 - 145 mmol/L    Potassium 4.1 3.5 - 5.2 mmol/L    Chloride 107 98 - 107 mmol/L    CO2 23.5 22.0 - 29.0 mmol/L    Calcium 9.3 8.6 - 10.5 mg/dL    Total Protein 6.7 6.0 - 8.5 g/dL    Albumin 4.40 3.50 - 5.20 g/dL    ALT (SGPT) 20 1 - 33 U/L    AST (SGOT) 19 1 - 32 U/L    Alkaline Phosphatase 96 39 - 117 U/L    Total Bilirubin 0.5 0.0 - 1.2 mg/dL    Globulin 2.3 gm/dL    A/G Ratio 1.9 g/dL    BUN/Creatinine Ratio 22.1 7.0 - 25.0    Anion Gap 10.5 5.0 - 15.0 mmol/L    eGFR 86.5 >60.0 mL/min/1.73   Green Top (Gel)    Collection Time: 06/03/22  4:24 PM   Result Value Ref Range    Extra Tube Hold for add-ons.    Lavender Top    Collection Time: 06/03/22  4:24 PM   Result Value Ref Range    Extra Tube hold for add-on    Light Blue Top    Collection Time: 06/03/22  4:24 PM   Result Value Ref Range    Extra Tube Hold for add-ons.    Duplex Venous Lower Extremity Right    Collection Time: 06/03/22  6:10 PM   Result Value Ref Range    Target HR (85%) 116 bpm    Max. Pred. HR (100%) 137 bpm    Right Common Femoral Spont  Y     Right Common Femoral Phasic Y     Right Common Femoral Augment Y     Right Common Femoral Competent Y     Right Common Femoral Compress C     Right Saphenofemoral Junction Compress C     Right Profunda Femoral Compress C     Right Proximal Femoral Compress C     Right Mid Femoral Spont Y     Right Mid Femoral Phasic Y     Right Mid Femoral Augment Y     Right Mid Femoral Competent Y     Right Mid Femoral Compress C     Right Distal Femoral Compress C     Right Popliteal Spont Y     Right Popliteal Phasic Y     Right Popliteal Augment Y     Right Popliteal Competent Y     Right Popliteal Compress C     Right Posterior Tibial Compress C     Right Peroneal Compress C     Right Gastronemius Compress C     Right Greater Saph AK Compress C     Right Greater Saph BK Compress C     Right Lesser Saph Compress C     Left Common Femoral Spont Y     Left Common Femoral Phasic Y     Left Common Femoral Augment Y     Left Common Femoral Competent Y     Left Common Femoral Compress C    CBC Auto Differential    Collection Time: 06/03/22  7:34 PM    Specimen: Blood   Result Value Ref Range    WBC 7.04 3.40 - 10.80 10*3/mm3    RBC 4.07 3.77 - 5.28 10*6/mm3    Hemoglobin 12.5 12.0 - 15.9 g/dL    Hematocrit 37.2 34.0 - 46.6 %    MCV 91.4 79.0 - 97.0 fL    MCH 30.7 26.6 - 33.0 pg    MCHC 33.6 31.5 - 35.7 g/dL    RDW 12.0 (L) 12.3 - 15.4 %    RDW-SD 40.1 37.0 - 54.0 fl    MPV 9.1 6.0 - 12.0 fL    Platelets 238 140 - 450 10*3/mm3    Neutrophil % 62.5 42.7 - 76.0 %    Lymphocyte % 29.3 19.6 - 45.3 %    Monocyte % 7.1 5.0 - 12.0 %    Eosinophil % 0.7 0.3 - 6.2 %    Basophil % 0.3 0.0 - 1.5 %    Neutrophils, Absolute 4.40 1.70 - 7.00 10*3/mm3    Lymphocytes, Absolute 2.06 0.70 - 3.10 10*3/mm3    Monocytes, Absolute 0.50 0.10 - 0.90 10*3/mm3    Eosinophils, Absolute 0.05 0.00 - 0.40 10*3/mm3    Basophils, Absolute 0.02 0.00 - 0.20 10*3/mm3       Ordered the above labs and independently reviewed the results.        RADIOLOGY  Duplex  Venous Lower Extremity Right    Result Date: 6/3/2022  · Normal right lower extremity venous duplex scan.        I ordered the above noted radiological studies. Reviewed by me and discussed with radiologist.  See dictation for official radiology interpretation.      PROCEDURES    Procedures      MEDICATIONS GIVEN IN ER    Medications - No data to display      PROGRESS, DATA ANALYSIS, CONSULTS, AND MEDICAL DECISION MAKING    All labs have been independently reviewed by me.  All radiology studies have been reviewed by me and discussed with radiologist dictating the report.   EKG's independently viewed and interpreted by me.  Discussion below represents my analysis of pertinent findings related to patient's condition, differential diagnosis, treatment plan and final disposition.        ED Course as of 06/04/22 0205 Fri Jun 03, 2022   1800 I have gone to see this patient and she was in Doppler.  Nurse saw and evaluated her stated she reported of some swelling to the right calf.  On her exam both legs appeared symmetric with no obvious swelling.  Patient states the swelling has resolved since earlier. [MM]   1927 I never saw this patient.  Patient was in Doppler.  Patient returned from Doppler after 7 PM.  My partner started the shift at 7 PM and he will see and evaluate this patient. [MM]   1927 I did speak with the vascular tech and it was negative for DVT to the right lower extremity [MM]      ED Course User Index  [MM] Keaton Jennings MD           PPE: The patient wore a surgical mask throughout the entire patient encounter. I wore an N95.    AS OF 02:05 EDT VITALS:    BP - 167/72  HR - 70  TEMP - 97.6 °F (36.4 °C) (Tympanic)  O2 SATS - 99%        DIAGNOSIS  Final diagnoses:   Musculoskeletal pain of right lower extremity         DISPOSITION  Discharge           Prashant Dorsey MD  06/04/22 0205

## 2022-06-03 NOTE — ED TRIAGE NOTES
Pt reports right calf edema that was noticed this morning.     Pt was wearing a mask during assessment.  This RN wore appropriate PPE

## 2022-06-03 NOTE — PROGRESS NOTES
Today's right lower venous doppler preliminary report is negative for deep vein thrombosis. This preliminary report was given to Dr. Jennings.

## 2022-06-06 ENCOUNTER — PATIENT OUTREACH (OUTPATIENT)
Dept: CASE MANAGEMENT | Facility: OTHER | Age: 82
End: 2022-06-06

## 2022-06-06 NOTE — OUTREACH NOTE
AMBULATORY CASE MANAGEMENT NOTE    Name and Relationship of Patient/Support Person: Jael Navarro - Self  Patient Outreach  RN-ACM outreach with patient. Discussed 6/3/22 ED visit regarding musculoskeletal pain to right lower extremity. Patient treated and discharged home.  Patient states to be compliant with ED recommendations; states improvement to right lower extremity swelling; has tenderness to knee and has 6/13/22 PCP appointment. Discussed with patient elevated blood pressure at ED. 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 Reviewed with patient ED AVS recommendations;  education; RN-ACM role and HRCM case management services. Patient verbalized understanding. No further questions voiced at this time. Additional outreach scheduled.          Adult Patient Profile  Questions/Answers    Flowsheet Row Most Recent Value   Symptoms/Conditions Managed at Home cardiovascular, musculoskeletal   Cardiovascular Symptoms/Conditions hypertension   Cardiovascular Management Strategies other (see comments)  [Physician follow up ]   Musculoskeletal Symptoms/Conditions other (see comments)  [musculoskeletal pain and swelling  to right lower extremity]   Musculoskeletal Management Strategies other (see comments)  [Physician follow up]   Barriers to Taking Medication as Prescribed none   Equipment Currently Used at Home cane, straight  [Patient states will be obtaining blood presure cuff]   Primary Source of Support/Comfort child(alfonso)   Name of Support/Comfort Primary Source Son (Santi)  and daughter in law   People in Home alone        Social Work Assessment  Questions/Answers     Flowsheet Row Most Recent Value   People in Home alone   Functional Status Comments Patient states to be independent with ADL's,  light meal preparation,  transportation and ambulates with cane as needed.    Equipment Currently Used at Home cane, straight  [Patient states will be obtaining blood presure cuff]        Send Education  Questions/Answers    Flowsheet Row Most Recent Value   Annual Wellness Visit:  Patient Has Completed   Other Patient Education/Resources  24/7 [facility] Nurse Call Line, Advanced Care Planning, MyChart   24/7 Nurse Call Line Education Method Send Materials   ACP Education Method Send Materials   MyChart Education Method Send Materials   Advanced Directives: Send Materials      SDOH updated and reviewed with the patient during this program:  Financial Resource Strain: Low Risk    • Difficulty of Paying Living Expenses: Not hard at all      Food Insecurity: No Food Insecurity   • Worried About Running Out of Food in the Last Year: Never true   • Ran Out of Food in the Last Year: Never true       Education Documentation  Self-Care, taught by Hazel Newell, RN at 6/6/2022  2:04 PM.  Learner: Patient  Readiness: Acceptance  Method: Explanation  Response: Verbalizes Understanding    Provider Follow-Up, taught by Hazel Newell, RN at 6/6/2022  2:04 PM.  Learner: Patient  Readiness: Acceptance  Method: Explanation  Response: Verbalizes Understanding    Blood Pressure Monitoring, taught by Hazel Newell, RN at 6/6/2022  2:04 PM.  Learner: Patient  Readiness: Acceptance  Method: Explanation  Response: Verbalizes Understanding          HAZEL OBANDO  Ambulatory Case Management    6/6/2022, 14:04 EDT

## 2022-06-13 ENCOUNTER — OFFICE VISIT (OUTPATIENT)
Dept: SPORTS MEDICINE | Facility: CLINIC | Age: 82
End: 2022-06-13

## 2022-06-13 ENCOUNTER — LAB (OUTPATIENT)
Dept: LAB | Facility: HOSPITAL | Age: 82
End: 2022-06-13

## 2022-06-13 VITALS
OXYGEN SATURATION: 97 % | HEART RATE: 68 BPM | DIASTOLIC BLOOD PRESSURE: 70 MMHG | SYSTOLIC BLOOD PRESSURE: 124 MMHG | HEIGHT: 64 IN | TEMPERATURE: 96.9 F | WEIGHT: 118 LBS | BODY MASS INDEX: 20.14 KG/M2

## 2022-06-13 DIAGNOSIS — M25.561 RIGHT KNEE PAIN, UNSPECIFIED CHRONICITY: ICD-10-CM

## 2022-06-13 DIAGNOSIS — R39.198 ABNORMAL URINATION: ICD-10-CM

## 2022-06-13 DIAGNOSIS — M79.89 RIGHT LEG SWELLING: Primary | ICD-10-CM

## 2022-06-13 DIAGNOSIS — R61 NIGHT SWEATS: ICD-10-CM

## 2022-06-13 DIAGNOSIS — R63.4 WEIGHT LOSS: ICD-10-CM

## 2022-06-13 LAB
BILIRUB UR QL STRIP: NEGATIVE
CLARITY UR: CLEAR
COLOR UR: YELLOW
CRP SERPL-MCNC: <0.3 MG/DL (ref 0–0.5)
ERYTHROCYTE [SEDIMENTATION RATE] IN BLOOD: 5 MM/HR (ref 0–30)
GLUCOSE UR STRIP-MCNC: NEGATIVE MG/DL
HGB UR QL STRIP.AUTO: NEGATIVE
KETONES UR QL STRIP: NEGATIVE
LDH SERPL-CCNC: 168 U/L (ref 135–214)
LEUKOCYTE ESTERASE UR QL STRIP.AUTO: NEGATIVE
NITRITE UR QL STRIP: NEGATIVE
PH UR STRIP.AUTO: <=5 [PH] (ref 5–8)
PROT UR QL STRIP: NEGATIVE
SP GR UR STRIP: >=1.03 (ref 1–1.03)
T4 FREE SERPL-MCNC: 1.43 NG/DL (ref 0.93–1.7)
TSH SERPL DL<=0.05 MIU/L-ACNC: 0.38 UIU/ML (ref 0.27–4.2)
UROBILINOGEN UR QL STRIP: NORMAL
VIT B12 BLD-MCNC: 428 PG/ML (ref 211–946)

## 2022-06-13 PROCEDURE — 84443 ASSAY THYROID STIM HORMONE: CPT | Performed by: FAMILY MEDICINE

## 2022-06-13 PROCEDURE — 73560 X-RAY EXAM OF KNEE 1 OR 2: CPT | Performed by: FAMILY MEDICINE

## 2022-06-13 PROCEDURE — 99214 OFFICE O/P EST MOD 30 MIN: CPT | Performed by: FAMILY MEDICINE

## 2022-06-13 PROCEDURE — 86480 TB TEST CELL IMMUN MEASURE: CPT | Performed by: FAMILY MEDICINE

## 2022-06-13 PROCEDURE — 86140 C-REACTIVE PROTEIN: CPT | Performed by: FAMILY MEDICINE

## 2022-06-13 PROCEDURE — 86376 MICROSOMAL ANTIBODY EACH: CPT | Performed by: FAMILY MEDICINE

## 2022-06-13 PROCEDURE — 85652 RBC SED RATE AUTOMATED: CPT | Performed by: FAMILY MEDICINE

## 2022-06-13 PROCEDURE — 84439 ASSAY OF FREE THYROXINE: CPT | Performed by: FAMILY MEDICINE

## 2022-06-13 PROCEDURE — 81003 URINALYSIS AUTO W/O SCOPE: CPT | Performed by: FAMILY MEDICINE

## 2022-06-13 PROCEDURE — 82607 VITAMIN B-12: CPT | Performed by: FAMILY MEDICINE

## 2022-06-13 PROCEDURE — 86800 THYROGLOBULIN ANTIBODY: CPT | Performed by: FAMILY MEDICINE

## 2022-06-13 PROCEDURE — 83615 LACTATE (LD) (LDH) ENZYME: CPT | Performed by: FAMILY MEDICINE

## 2022-06-13 PROCEDURE — 36415 COLL VENOUS BLD VENIPUNCTURE: CPT | Performed by: FAMILY MEDICINE

## 2022-06-13 NOTE — PROGRESS NOTES
"Jael is a 81 y.o. year old female     Chief Complaint   Patient presents with   • Right Leg - Edema, Pain       History of Present Illness  HPI   Recent R lower leg swelling - went to ER 6/3 - doppler negative for DVT. Painhas resolved but swelling continues up to the knee.     Also concerned about ongoing night sweats    Review of Systems    /70 (BP Location: Left arm, Patient Position: Sitting)   Pulse 68   Temp 96.9 °F (36.1 °C) (Temporal)   Ht 163.8 cm (64.49\")   Wt 53.5 kg (118 lb)   SpO2 97%   BMI 19.95 kg/m²      Physical Exam    Vital signs reviewed.   General: No acute distress.    MSK Exam:  Ortho Exam  Right leg with minimal soft tissue swelling.  Right knee has nonspecific diffuse joint line tenderness.  There is no posterior tenderness.  Negative Homans' sign.    Right Knee X-Ray  Indication: Pain    Views: AP, Lateral, and [place]    Findings:  No fracture  No bony lesion  Normal soft tissues  Moderate severity tricompartmental arthritis    No prior studies were available for comparison.       Diagnoses and all orders for this visit:    Right leg swelling  -     XR Knee 1 or 2 View Right    Right knee pain, unspecified chronicity  -     XR Knee 1 or 2 View Right    Night sweats  -     T4, Free  -     TSH  -     Vitamin B12  -     Thyroid Antibodies  -     Sedimentation Rate  -     C-reactive Protein  -     QuantiFERON TB Gold  -     Lactate Dehydrogenase    Weight loss  -     T4, Free  -     TSH  -     Vitamin B12  -     Thyroid Antibodies  -     Sedimentation Rate  -     C-reactive Protein  -     QuantiFERON TB Gold  -     Lactate Dehydrogenase      No evidence of popliteal cyst or vascular cause of her right leg swelling.  She could potentially have some obstructive flow due to her knee arthritis if she is getting some effusion.  She will continue some Tylenol and NSAIDs for that.  Consider venous incompetence from varicose veins.  Monitor progress from there.  Recommend compression " stockings.    Will begin repeat work-up for her night sweats which was previously negative.  Consider full torso axial imaging.

## 2022-06-14 DIAGNOSIS — R61 NIGHT SWEATS: Primary | ICD-10-CM

## 2022-06-14 DIAGNOSIS — R63.4 WEIGHT LOSS: ICD-10-CM

## 2022-06-15 LAB
GAMMA INTERFERON BACKGROUND BLD IA-ACNC: 0.01 IU/ML
M TB IFN-G BLD-IMP: NEGATIVE
M TB IFN-G CD4+ BCKGRND COR BLD-ACNC: 0.03 IU/ML
M TB IFN-G CD4+CD8+ BCKGRND COR BLD-ACNC: 0.02 IU/ML
MITOGEN IGNF BLD-ACNC: 5.13 IU/ML
QUANTIFERON INCUBATION: NORMAL
SERVICE CMNT-IMP: NORMAL
THYROGLOB AB SERPL-ACNC: 2.5 IU/ML (ref 0–0.9)
THYROPEROXIDASE AB SERPL-ACNC: 22 IU/ML (ref 0–34)

## 2022-06-15 NOTE — PROGRESS NOTES
Called patient via phone and verbally relayed results and recommendations per Dr. Dao. All information was verbally understood by the patient.     Thank you  Mariam

## 2022-06-21 DIAGNOSIS — R76.8 THYROGLOBULIN ANTIBODY POSITIVE: Primary | ICD-10-CM

## 2022-06-22 ENCOUNTER — PATIENT OUTREACH (OUTPATIENT)
Dept: CASE MANAGEMENT | Facility: OTHER | Age: 82
End: 2022-06-22

## 2022-06-22 NOTE — OUTREACH NOTE
AMBULATORY CASE MANAGEMENT NOTE    Name and Relationship of Patient/Support Person: Jael Navarro - Self    Patient Outreach  RN-ACM outreach with patient. Patient states to have episodes of back pain; pain to right leg; night sweats and  decrease in sleeping and appetite. Patient states to be  scheduled for 6/23/22 MRI. Patient states to be compliant with medications; medical appointments and monitoring of blood pressure with reading WNL's. Reviewed with patient education and patient verbalized understanding. Patient states to appreciate patient outreach. No further questions voiced at this time. .     Education Documentation  Unresolved/Worsening Symptoms, taught by Hazel Newell RN at 6/22/2022  4:38 PM.  Learner: Patient  Readiness: Acceptance  Method: Explanation  Response: Verbalizes Understanding    Self-Care, taught by Hazel Newell RN at 6/22/2022  4:38 PM.  Learner: Patient  Readiness: Acceptance  Method: Explanation  Response: Verbalizes Understanding    Provider Follow-Up, taught by Hazel Newell RN at 6/22/2022  4:38 PM.  Learner: Patient  Readiness: Acceptance  Method: Explanation  Response: Verbalizes Understanding    Medication Management, taught by Hazel Newell RN at 6/22/2022  4:38 PM.  Learner: Patient  Readiness: Acceptance  Method: Explanation  Response: Verbalizes Understanding    Blood Pressure Monitoring, taught by Hazel Newell RN at 6/22/2022  4:38 PM.  Learner: Patient  Readiness: Acceptance  Method: Explanation  Response: Verbalizes Understanding          HAZEL OBANDO  Ambulatory Case Management    6/22/2022, 16:39 EDT

## 2022-06-23 ENCOUNTER — HOSPITAL ENCOUNTER (OUTPATIENT)
Dept: CT IMAGING | Facility: HOSPITAL | Age: 82
Discharge: HOME | End: 2022-06-23
Admitting: FAMILY MEDICINE

## 2022-06-23 DIAGNOSIS — R63.4 WEIGHT LOSS: ICD-10-CM

## 2022-06-23 DIAGNOSIS — R61 NIGHT SWEATS: ICD-10-CM

## 2022-06-23 PROCEDURE — 71250 CT THORAX DX C-: CPT

## 2022-06-23 PROCEDURE — 74176 CT ABD & PELVIS W/O CONTRAST: CPT

## 2022-07-01 ENCOUNTER — HOSPITAL ENCOUNTER (OUTPATIENT)
Dept: ULTRASOUND IMAGING | Facility: HOSPITAL | Age: 82
Discharge: HOME | End: 2022-07-01
Admitting: FAMILY MEDICINE

## 2022-07-01 DIAGNOSIS — R76.8 THYROGLOBULIN ANTIBODY POSITIVE: ICD-10-CM

## 2022-07-01 PROCEDURE — 76536 US EXAM OF HEAD AND NECK: CPT

## 2022-07-21 ENCOUNTER — PATIENT OUTREACH (OUTPATIENT)
Dept: CASE MANAGEMENT | Facility: OTHER | Age: 82
End: 2022-07-21

## 2022-07-21 NOTE — OUTREACH NOTE
AMBULATORY CASE MANAGEMENT NOTE    Name and Relationship of Patient/Support Person: Jael Navarro - Self    Patient Outreach  RN-ACM outreach with patient. Patient states to be compliant with medications; medical appointments and monitoring of blood pressure (WNL's). Patient states to have difficulty with night sweats; hoarseness and forgetfulness. Patient states to have difficulty with sleeping; having urinary incontinence and episodes of dizziness. Patient states improvement regarding appetite; swelling to legs  and no dificulty with fever; chest pain or  SOB. RN-ACM advised patient to contact PCP regarding symptoms for follow up and recommendations. Patient verbalized understanding. Reviewed with patient education; gaps in care;  24/7 Nurse Line Telephone number and provided supportive listening.  Patient verbalized understanding and states to appreciate patient outreach. No further questions voiced at this rashad. Additional outreach scheduled.     Education Documentation  Unresolved/Worsening Symptoms, taught by Hazel Newell RN at 7/21/2022  3:10 PM.  Learner: Patient  Readiness: Acceptance  Method: Explanation  Response: Verbalizes Understanding    Safety, taught by Hazel Newell RN at 7/21/2022  3:10 PM.  Learner: Patient  Readiness: Acceptance  Method: Explanation  Response: Verbalizes Understanding    Unresolved/Worsening Symptoms, taught by Hazel Newell RN at 7/21/2022  3:10 PM.  Learner: Patient  Readiness: Acceptance  Method: Explanation  Response: Verbalizes Understanding    Medication Management, taught by Hazel Newell RN at 7/21/2022  3:10 PM.  Learner: Patient  Readiness: Acceptance  Method: Explanation  Response: Verbalizes Understanding    Hygiene, taught by Hazel Newell RN at 7/21/2022  3:10 PM.  Learner: Patient  Readiness: Acceptance  Method: Explanation  Response: Verbalizes Understanding    Fluid/Food Intake, taught by Hazel Newell RN at 7/21/2022  3:10  PM.  Learner: Patient  Readiness: Acceptance  Method: Explanation  Response: Verbalizes Understanding    Equipment Use, taught by Hazel Newell RN at 7/21/2022  3:10 PM.  Learner: Patient  Readiness: Acceptance  Method: Explanation  Response: Verbalizes Understanding    Equipment Management, taught by Hazel Newell RN at 7/21/2022  3:10 PM.  Learner: Patient  Readiness: Acceptance  Method: Explanation  Response: Verbalizes Understanding    Bladder Program, taught by Hazel Newell RN at 7/21/2022  3:10 PM.  Learner: Patient  Readiness: Acceptance  Method: Explanation  Response: Verbalizes Understanding    Unresolved/Worsening Symptoms, taught by Hazel Newell RN at 7/21/2022  3:10 PM.  Learner: Patient  Readiness: Acceptance  Method: Explanation  Response: Verbalizes Understanding    Self-Care, taught by Hazel Newell RN at 7/21/2022  3:10 PM.  Learner: Patient  Readiness: Acceptance  Method: Explanation  Response: Verbalizes Understanding    Provider Follow-Up, taught by Hazel Newell RN at 7/21/2022  3:10 PM.  Learner: Patient  Readiness: Acceptance  Method: Explanation  Response: Verbalizes Understanding    Medication Management, taught by Hazel Newell RN at 7/21/2022  3:10 PM.  Learner: Patient  Readiness: Acceptance  Method: Explanation  Response: Verbalizes Understanding    Blood Pressure Monitoring, taught by Hazel Newell RN at 7/21/2022  3:10 PM.  Learner: Patient  Readiness: Acceptance  Method: Explanation  Response: Verbalizes Understanding          HAZEL OBANDO  Ambulatory Case Management    7/21/2022, 15:10 EDT

## 2022-08-04 ENCOUNTER — OFFICE VISIT (OUTPATIENT)
Dept: SPORTS MEDICINE | Facility: CLINIC | Age: 82
End: 2022-08-04

## 2022-08-04 VITALS
HEIGHT: 64 IN | SYSTOLIC BLOOD PRESSURE: 140 MMHG | WEIGHT: 116 LBS | DIASTOLIC BLOOD PRESSURE: 60 MMHG | BODY MASS INDEX: 19.81 KG/M2 | TEMPERATURE: 97.7 F | OXYGEN SATURATION: 97 % | HEART RATE: 67 BPM | RESPIRATION RATE: 16 BRPM

## 2022-08-04 DIAGNOSIS — E03.9 ACQUIRED HYPOTHYROIDISM: ICD-10-CM

## 2022-08-04 DIAGNOSIS — R76.8 THYROGLOBULIN ANTIBODY POSITIVE: Primary | ICD-10-CM

## 2022-08-04 DIAGNOSIS — M25.511 ACUTE PAIN OF RIGHT SHOULDER: ICD-10-CM

## 2022-08-04 DIAGNOSIS — R39.198 ABNORMAL URINATION: ICD-10-CM

## 2022-08-04 DIAGNOSIS — E03.9 HYPOTHYROIDISM, UNSPECIFIED TYPE: ICD-10-CM

## 2022-08-04 PROCEDURE — 99214 OFFICE O/P EST MOD 30 MIN: CPT | Performed by: FAMILY MEDICINE

## 2022-08-04 RX ORDER — LEVOTHYROXINE SODIUM 0.05 MG/1
50 TABLET ORAL DAILY
Qty: 30 TABLET | Refills: 1 | Status: SHIPPED | OUTPATIENT
Start: 2022-08-04 | End: 2022-08-04 | Stop reason: SDUPTHER

## 2022-08-04 RX ORDER — LEVOTHYROXINE SODIUM 0.05 MG/1
50 TABLET ORAL DAILY
Qty: 90 TABLET | Refills: 1 | Status: SHIPPED | OUTPATIENT
Start: 2022-08-04

## 2022-08-04 NOTE — PROGRESS NOTES
"Jael is a 82 y.o. year old female    Chief Complaint   Patient presents with   • Follow-up     UC f/u eval for diarrhea since last Friday - having some stomach pain with that - UC visit on 07/29/2022    • Thyroid Problem     F/u julius l for thyroid - worsening sxs - recent US done of the thyroid as well - reports having hot flashes and other sxs that aren't getting any better    • Arm Pain     [facility] RT arm pain [facility] [place] - RT arm is sore, not so much painful but just wanted to talk about it        History of Present Illness   HPI   Last week felt badly - weak overall, upset stomach, loose stools. Went to UC - covid negative. Then had 8-9 BMs in 24 hours more explosive/watery. \"I couldn't tell you the last time I had a formed stool.\"     Bilat leg swelling, R>L, for months. Has not used compression stockings.     R shoulder pain post fall yesterday    Chronic nocturnal incontinence - rescheduled with urogyn later this year    Continues to have night sweats    Review of Systems    /60 (BP Location: Right arm, Patient Position: Sitting, Cuff Size: Adult)   Pulse 67   Temp 97.7 °F (36.5 °C) (Temporal)   Resp 16   Ht 162.6 cm (64.02\")   Wt 52.6 kg (116 lb)   SpO2 97%   Breastfeeding No   BMI 19.90 kg/m²          Physical Exam  Vitals reviewed.   Pulmonary:      Effort: Pulmonary effort is normal.   Musculoskeletal:      Right lower leg: Edema present.      Left lower leg: Edema present.      Comments: Right shoulder: Tenderness palpation nonspecifically in the humeral head.  There is intact active range of motion but there is pain above 90 degrees flexion and abduction.  Strength testing is painful in all planes.  Positive Neer and Villela.   Psychiatric:         Mood and Affect: Mood is anxious.         Behavior: Behavior normal.         Thought Content: Thought content normal.         Judgment: Judgment normal.     Right Shoulder X-Ray  Indication: Pain  Views: AP " Internal and External Rotation    Findings:  No fracture  No bony lesion  Normal soft tissues  Normal joint spaces    No prior studies were available for comparison.       Current Outpatient Medications:   •  carboxymethylcellulose (REFRESH PLUS) 0.5 % solution, 1 drop 2 (Two) Times a Day As Needed for Dry Eyes., Disp: , Rfl:   •  cilostazol (PLETAL) 50 MG tablet, Take 1 tablet by mouth 2 (Two) Times a Day Before Meals., Disp: 180 tablet, Rfl: 3  •  levothyroxine (SYNTHROID, LEVOTHROID) 50 MCG tablet, Take 1 tablet by mouth Daily., Disp: 90 tablet, Rfl: 1  •  Multiple Vitamin (MULTI-VITAMIN) tablet, Take by mouth., Disp: , Rfl:      Diagnoses and all orders for this visit:    Thyroglobulin antibody positive    Abnormal urination    Hypothyroidism, unspecified type  -     Discontinue: levothyroxine (SYNTHROID, LEVOTHROID) 50 MCG tablet; Take 1 tablet by mouth Daily.  -     levothyroxine (SYNTHROID, LEVOTHROID) 50 MCG tablet; Take 1 tablet by mouth Daily.    Acquired hypothyroidism    Acute pain of right shoulder  -     XR Shoulder 2+ View Right       Reduce from 75 to 50mcg thyroid replacement - suspect possible overtreatment despite tsh in range  Recommend compression stockings  Regarding her shoulder this appears like a benign contusion with posttraumatic bursitis.  Discussed ice and NSAIDs and follow-up as needed.  Regarding her chronic abnormal urination recommend continue to follow-up with urogynecology

## 2022-08-22 ENCOUNTER — PATIENT OUTREACH (OUTPATIENT)
Dept: CASE MANAGEMENT | Facility: OTHER | Age: 82
End: 2022-08-22

## 2022-08-22 NOTE — OUTREACH NOTE
AMBULATORY CASE MANAGEMENT NOTE    Name and Relationship of Patient/Support Person: Jael Navarro - Self    Patient Outreach  RN-ACM outreach with patient. Patient states improvement regarding diarrhea following UC and PCP visit. Patient states to have received recommendations regarding levothyroxine 50 mcg daily. Patient states to be compliant with medications and monitoring of blood pressure(WNL's) . Patient states improvement regarding memory; hoarseness; states to have episodes of urinary incontinence at night and  episodes of night sweats. RN-ACM advised patient to discuss with PCP for recommendations. Patient verbalized understanding. Reviewed with patient education; 24/7 Nurse Line Telephone number and provided supportive listening. Patient verbalized understanding and states to appreciate patient outreach. No further questions or concerns voiced at this time.       Education Documentation  Unresolved/Worsening Symptoms, taught by Hazel Newell RN at 8/22/2022  1:53 PM.  Learner: Patient  Readiness: Acceptance  Method: Explanation  Response: Verbalizes Understanding    Diet Modification, taught by Hazel Newell RN at 8/22/2022  1:53 PM.  Learner: Patient  Readiness: Acceptance  Method: Explanation  Response: Verbalizes Understanding    Unresolved/Worsening Symptoms, taught by Hazel Newell RN at 8/22/2022  1:53 PM.  Learner: Patient  Readiness: Acceptance  Method: Explanation  Response: Verbalizes Understanding    Safety, taught by Hazel Newell RN at 8/22/2022  1:53 PM.  Learner: Patient  Readiness: Acceptance  Method: Explanation  Response: Verbalizes Understanding    Unresolved/Worsening Symptoms, taught by Hazel Newell RN at 8/22/2022  1:53 PM.  Learner: Patient  Readiness: Acceptance  Method: Explanation  Response: Verbalizes Understanding    Medication Management, taught by Hazel Newell RN at 8/22/2022  1:53 PM.  Learner: Patient  Readiness: Acceptance  Method:  Explanation  Response: Verbalizes Understanding    Fluid/Food Intake, taught by Hazel Newell, FIDELIA at 8/22/2022  1:53 PM.  Learner: Patient  Readiness: Acceptance  Method: Explanation  Response: Verbalizes Understanding    Bladder Program, taught by Hazel Newell, FIDELIA at 8/22/2022  1:53 PM.  Learner: Patient  Readiness: Acceptance  Method: Explanation  Response: Verbalizes Understanding    Provider Follow-Up, taught by Hazel Newell RN at 8/22/2022  1:53 PM.  Learner: Patient  Readiness: Acceptance  Method: Explanation  Response: Verbalizes Understanding    Blood Pressure Monitoring, taught by Hazel Newell, RN at 8/22/2022  1:53 PM.  Learner: Patient  Readiness: Acceptance  Method: Explanation  Response: Verbalizes Understanding          HAZEL OBANDO  Ambulatory Case Management    8/22/2022, 13:53 EDT

## 2022-09-01 ENCOUNTER — LAB (OUTPATIENT)
Dept: LAB | Facility: HOSPITAL | Age: 82
End: 2022-09-01

## 2022-09-01 ENCOUNTER — OFFICE VISIT (OUTPATIENT)
Dept: SPORTS MEDICINE | Facility: CLINIC | Age: 82
End: 2022-09-01

## 2022-09-01 VITALS
OXYGEN SATURATION: 97 % | HEART RATE: 73 BPM | DIASTOLIC BLOOD PRESSURE: 40 MMHG | SYSTOLIC BLOOD PRESSURE: 146 MMHG | HEIGHT: 64 IN | TEMPERATURE: 98 F | WEIGHT: 116 LBS | BODY MASS INDEX: 19.81 KG/M2

## 2022-09-01 DIAGNOSIS — E03.9 HYPOTHYROIDISM, UNSPECIFIED TYPE: Primary | ICD-10-CM

## 2022-09-01 DIAGNOSIS — R61 NIGHT SWEATS: ICD-10-CM

## 2022-09-01 LAB
ANION GAP SERPL CALCULATED.3IONS-SCNC: 8.7 MMOL/L (ref 5–15)
BILIRUB UR QL STRIP: NEGATIVE
BUN SERPL-MCNC: 19 MG/DL (ref 8–23)
BUN/CREAT SERPL: 24.4 (ref 7–25)
CALCIUM SPEC-SCNC: 9.7 MG/DL (ref 8.6–10.5)
CHLORIDE SERPL-SCNC: 104 MMOL/L (ref 98–107)
CLARITY UR: ABNORMAL
CO2 SERPL-SCNC: 29.3 MMOL/L (ref 22–29)
COLOR UR: YELLOW
CORTIS SERPL-MCNC: 6.9 MCG/DL
CREAT SERPL-MCNC: 0.78 MG/DL (ref 0.57–1)
EGFRCR SERPLBLD CKD-EPI 2021: 75 ML/MIN/1.73
FSH SERPL-ACNC: 59 MIU/ML
GLUCOSE SERPL-MCNC: 98 MG/DL (ref 65–99)
GLUCOSE UR STRIP-MCNC: NEGATIVE MG/DL
HGB UR QL STRIP.AUTO: NEGATIVE
KETONES UR QL STRIP: ABNORMAL
LEUKOCYTE ESTERASE UR QL STRIP.AUTO: NEGATIVE
LH SERPL-ACNC: 26.7 MIU/ML
NITRITE UR QL STRIP: NEGATIVE
PH UR STRIP.AUTO: 6.5 [PH] (ref 5–8)
POTASSIUM SERPL-SCNC: 4 MMOL/L (ref 3.5–5.2)
PROT UR QL STRIP: NEGATIVE
SODIUM SERPL-SCNC: 142 MMOL/L (ref 136–145)
SODIUM UR-SCNC: 137 MMOL/L
SP GR UR STRIP: 1.02 (ref 1–1.03)
T4 FREE SERPL-MCNC: 1.08 NG/DL (ref 0.93–1.7)
TSH SERPL DL<=0.05 MIU/L-ACNC: 5.26 UIU/ML (ref 0.27–4.2)
UROBILINOGEN UR QL STRIP: ABNORMAL

## 2022-09-01 PROCEDURE — 83002 ASSAY OF GONADOTROPIN (LH): CPT | Performed by: FAMILY MEDICINE

## 2022-09-01 PROCEDURE — 83001 ASSAY OF GONADOTROPIN (FSH): CPT | Performed by: FAMILY MEDICINE

## 2022-09-01 PROCEDURE — 84443 ASSAY THYROID STIM HORMONE: CPT | Performed by: FAMILY MEDICINE

## 2022-09-01 PROCEDURE — 82533 TOTAL CORTISOL: CPT | Performed by: FAMILY MEDICINE

## 2022-09-01 PROCEDURE — 36415 COLL VENOUS BLD VENIPUNCTURE: CPT | Performed by: FAMILY MEDICINE

## 2022-09-01 PROCEDURE — 84439 ASSAY OF FREE THYROXINE: CPT | Performed by: FAMILY MEDICINE

## 2022-09-01 PROCEDURE — 80048 BASIC METABOLIC PNL TOTAL CA: CPT | Performed by: FAMILY MEDICINE

## 2022-09-01 PROCEDURE — 82672 ASSAY OF ESTROGEN: CPT | Performed by: FAMILY MEDICINE

## 2022-09-01 PROCEDURE — 84300 ASSAY OF URINE SODIUM: CPT | Performed by: FAMILY MEDICINE

## 2022-09-01 PROCEDURE — 99214 OFFICE O/P EST MOD 30 MIN: CPT | Performed by: FAMILY MEDICINE

## 2022-09-01 PROCEDURE — 81003 URINALYSIS AUTO W/O SCOPE: CPT | Performed by: FAMILY MEDICINE

## 2022-09-01 NOTE — PROGRESS NOTES
"Jael is a 82 y.o. year old female    Chief Complaint   Patient presents with   • Thyroid Problem     Would like to discuss her medication dosage       History of Present Illness   HPI   Here today to follow-up on changing thyroid dose.  Feeling better mentally and physically  Notes had been having some facial hair but less  Night sweats less than before  Excessive urine output - using 2 overnight diapers and saturated  Loose stools continue    Review of Systems    /40 (BP Location: Right arm, Patient Position: Sitting, Cuff Size: Adult)   Pulse 73   Temp 98 °F (36.7 °C) (Temporal)   Ht 162.6 cm (64.02\")   Wt 52.6 kg (116 lb)   SpO2 97%   BMI 19.90 kg/m²          Physical Exam  Vitals reviewed.   Pulmonary:      Effort: Pulmonary effort is normal.   Neurological:      Mental Status: She is alert.   Psychiatric:         Mood and Affect: Mood normal.           Current Outpatient Medications:   •  carboxymethylcellulose (REFRESH PLUS) 0.5 % solution, 1 drop 2 (Two) Times a Day As Needed for Dry Eyes., Disp: , Rfl:   •  cilostazol (PLETAL) 50 MG tablet, Take 1 tablet by mouth 2 (Two) Times a Day Before Meals., Disp: 180 tablet, Rfl: 3  •  levothyroxine (SYNTHROID, LEVOTHROID) 50 MCG tablet, Take 1 tablet by mouth Daily., Disp: 90 tablet, Rfl: 1  •  Multiple Vitamin (MULTI-VITAMIN) tablet, Take by mouth., Disp: , Rfl:      Diagnoses and all orders for this visit:    Hypothyroidism, unspecified type  -     TSH  -     T4, Free  -     FSH & LH  -     Estrogens, Total  -     Basic Metabolic Panel  -     Urinalysis With Culture If Indicated - Urine, Clean Catch  -     Sodium, Urine, Random - Urine, Clean Catch  -     Cortisol    Night sweats  -     TSH  -     T4, Free  -     FSH & LH  -     Estrogens, Total  -     Basic Metabolic Panel  -     Urinalysis With Culture If Indicated - Urine, Clean Catch  -     Sodium, Urine, Random - Urine, Clean Catch  -     Cortisol         Trial off cilostazol for loose stools?  " This seems unlikely, but it is a listed side effect.  Recheck labs, likely step down synthroid again to 25mcg

## 2022-09-05 LAB — ESTROGEN SERPL-MCNC: 47 PG/ML (ref 40–244)

## 2022-09-14 ENCOUNTER — TELEPHONE (OUTPATIENT)
Dept: SPORTS MEDICINE | Facility: CLINIC | Age: 82
End: 2022-09-14

## 2022-09-14 DIAGNOSIS — L71.9 ROSACEA: Primary | ICD-10-CM

## 2022-09-14 DIAGNOSIS — Z12.31 BREAST CANCER SCREENING BY MAMMOGRAM: ICD-10-CM

## 2022-09-14 NOTE — TELEPHONE ENCOUNTER
Patient called requesting an order for an annual mammogram screening be faxed to [phone] she has an appointment with Aamir on 10/03/22 at 11:00 am.     Please advise

## 2022-09-15 NOTE — TELEPHONE ENCOUNTER
I have placed the order in the system. I have also faxed it to the number the patient provided. Thank you!

## 2022-09-26 ENCOUNTER — APPOINTMENT (OUTPATIENT)
Dept: CT IMAGING | Facility: HOSPITAL | Age: 82
End: 2022-09-26

## 2022-09-26 ENCOUNTER — HOSPITAL ENCOUNTER (EMERGENCY)
Facility: HOSPITAL | Age: 82
Discharge: HOME | End: 2022-09-26
Attending: EMERGENCY MEDICINE | Admitting: EMERGENCY MEDICINE

## 2022-09-26 ENCOUNTER — TELEPHONE (OUTPATIENT)
Dept: SPORTS MEDICINE | Facility: CLINIC | Age: 82
End: 2022-09-26

## 2022-09-26 VITALS
HEART RATE: 73 BPM | OXYGEN SATURATION: 98 % | RESPIRATION RATE: 16 BRPM | TEMPERATURE: 97.7 F | DIASTOLIC BLOOD PRESSURE: 68 MMHG | SYSTOLIC BLOOD PRESSURE: 186 MMHG

## 2022-09-26 DIAGNOSIS — R19.7 DIARRHEA, UNSPECIFIED TYPE: Primary | ICD-10-CM

## 2022-09-26 LAB
ALBUMIN SERPL-MCNC: 4.6 G/DL (ref 3.5–5.2)
ALBUMIN/GLOB SERPL: 2.1 G/DL
ALP SERPL-CCNC: 77 U/L (ref 39–117)
ALT SERPL W P-5'-P-CCNC: 24 U/L (ref 1–33)
ANION GAP SERPL CALCULATED.3IONS-SCNC: 11 MMOL/L (ref 5–15)
AST SERPL-CCNC: 27 U/L (ref 1–32)
BASOPHILS # BLD AUTO: 0.02 10*3/MM3 (ref 0–0.2)
BASOPHILS NFR BLD AUTO: 0.4 % (ref 0–1.5)
BILIRUB SERPL-MCNC: 0.4 MG/DL (ref 0–1.2)
BUN SERPL-MCNC: 21 MG/DL (ref 8–23)
BUN/CREAT SERPL: 26.6 (ref 7–25)
CALCIUM SPEC-SCNC: 9.5 MG/DL (ref 8.6–10.5)
CHLORIDE SERPL-SCNC: 102 MMOL/L (ref 98–107)
CO2 SERPL-SCNC: 23 MMOL/L (ref 22–29)
CREAT SERPL-MCNC: 0.79 MG/DL (ref 0.57–1)
D-LACTATE SERPL-SCNC: 1.1 MMOL/L (ref 0.5–2)
DEPRECATED RDW RBC AUTO: 38.5 FL (ref 37–54)
EGFRCR SERPLBLD CKD-EPI 2021: 73.9 ML/MIN/1.73
EOSINOPHIL # BLD AUTO: 0 10*3/MM3 (ref 0–0.4)
EOSINOPHIL NFR BLD AUTO: 0 % (ref 0.3–6.2)
ERYTHROCYTE [DISTWIDTH] IN BLOOD BY AUTOMATED COUNT: 11.8 % (ref 12.3–15.4)
GLOBULIN UR ELPH-MCNC: 2.2 GM/DL
GLUCOSE SERPL-MCNC: 85 MG/DL (ref 65–99)
HCT VFR BLD AUTO: 39.3 % (ref 34–46.6)
HGB BLD-MCNC: 13.8 G/DL (ref 12–15.9)
HOLD SPECIMEN: NORMAL
HOLD SPECIMEN: NORMAL
IMM GRANULOCYTES # BLD AUTO: 0.01 10*3/MM3 (ref 0–0.05)
IMM GRANULOCYTES NFR BLD AUTO: 0.2 % (ref 0–0.5)
LIPASE SERPL-CCNC: 58 U/L (ref 13–60)
LYMPHOCYTES # BLD AUTO: 1.12 10*3/MM3 (ref 0.7–3.1)
LYMPHOCYTES NFR BLD AUTO: 24.8 % (ref 19.6–45.3)
MCH RBC QN AUTO: 30.9 PG (ref 26.6–33)
MCHC RBC AUTO-ENTMCNC: 35.1 G/DL (ref 31.5–35.7)
MCV RBC AUTO: 88.1 FL (ref 79–97)
MONOCYTES # BLD AUTO: 0.39 10*3/MM3 (ref 0.1–0.9)
MONOCYTES NFR BLD AUTO: 8.6 % (ref 5–12)
NEUTROPHILS NFR BLD AUTO: 2.98 10*3/MM3 (ref 1.7–7)
NEUTROPHILS NFR BLD AUTO: 66 % (ref 42.7–76)
NRBC BLD AUTO-RTO: 0 /100 WBC (ref 0–0.2)
PLATELET # BLD AUTO: 221 10*3/MM3 (ref 140–450)
PMV BLD AUTO: 9.3 FL (ref 6–12)
POTASSIUM SERPL-SCNC: 4 MMOL/L (ref 3.5–5.2)
PROT SERPL-MCNC: 6.8 G/DL (ref 6–8.5)
RBC # BLD AUTO: 4.46 10*6/MM3 (ref 3.77–5.28)
SODIUM SERPL-SCNC: 136 MMOL/L (ref 136–145)
WBC NRBC COR # BLD: 4.52 10*3/MM3 (ref 3.4–10.8)
WHOLE BLOOD HOLD COAG: NORMAL
WHOLE BLOOD HOLD SPECIMEN: NORMAL

## 2022-09-26 PROCEDURE — 83605 ASSAY OF LACTIC ACID: CPT

## 2022-09-26 PROCEDURE — 74176 CT ABD & PELVIS W/O CONTRAST: CPT

## 2022-09-26 PROCEDURE — 83690 ASSAY OF LIPASE: CPT

## 2022-09-26 PROCEDURE — 80053 COMPREHEN METABOLIC PANEL: CPT

## 2022-09-26 PROCEDURE — 36415 COLL VENOUS BLD VENIPUNCTURE: CPT

## 2022-09-26 PROCEDURE — 85025 COMPLETE CBC W/AUTO DIFF WBC: CPT

## 2022-09-26 PROCEDURE — 99283 EMERGENCY DEPT VISIT LOW MDM: CPT

## 2022-09-26 RX ORDER — SODIUM CHLORIDE 0.9 % (FLUSH) 0.9 %
10 SYRINGE (ML) INJECTION AS NEEDED
Status: DISCONTINUED | OUTPATIENT
Start: 2022-09-26 | End: 2022-09-26 | Stop reason: HOSPADM

## 2022-09-26 RX ADMIN — SODIUM CHLORIDE, POTASSIUM CHLORIDE, SODIUM LACTATE AND CALCIUM CHLORIDE 1000 ML: 600; 310; 30; 20 INJECTION, SOLUTION INTRAVENOUS at 17:55

## 2022-09-26 NOTE — ED PROVIDER NOTES
EMERGENCY DEPARTMENT ENCOUNTER    Room Number:  10/10  Date of encounter:  9/26/2022  PCP: Keaton Dao MD  Historian: Patient      HPI:  Chief Complaint: Vomiting and diarrhea        Context: Jael Navarro is a 82 y.o. female who presents to the ED c/o vomiting and diarrhea for the past 80 hours.  Patient states it was severe for the first 24 hours to the point that while she was having both vomiting and diarrhea she thinks she may have briefly passed out.  The patient states she does not believe she had any vomiting and advised 48 hours and that her diarrhea has improved and she is only had several episodes today.  She states she feels very weak and called her PCPs office and was advised to come to the emergency room for IV fluids for dehydration.  Patient denies any recent antibiotics, bad food exposures or ill contacts.      PAST MEDICAL HISTORY  Active Ambulatory Problems     Diagnosis Date Noted   • Hypothyroidism 07/20/2016   • Atopic rhinitis 07/21/2016   • Allergy to dog dander 07/21/2016   • Anxiety 07/21/2016   • Allergy to cats 07/21/2016   • Cataract 07/21/2016   • Degeneration of intervertebral disc of cervical region 07/21/2016   • Fibromyalgia 07/21/2016   • Hyperlipidemia 07/21/2016   • Essential hypertension 07/21/2016   • Impaired glucose tolerance 07/21/2016   • Bladder prolapse, female, acquired 07/21/2016   • Rosacea 07/21/2016   • Urinary incontinence 07/21/2016   • Vitamin D deficiency 07/21/2016   • Macular degeneration of both eyes 08/29/2018   • Peripheral arterial disease (HCC) 04/02/2019     Resolved Ambulatory Problems     Diagnosis Date Noted   • Gastroesophageal reflux disease 07/21/2016   • Microscopic hematuria 07/21/2016   • LLQ pain 03/29/2017   • Acute cystitis without hematuria 12/12/2017   • Acute cystitis 12/12/2017     Past Medical History:   Diagnosis Date   • Allergic rhinitis    • Degeneration, intervertebral disc, thoracic    • Disease of thyroid gland    •  Fracture of ankle    • Gingival and periodontal disease    • Hypertension    • Irritable bowel syndrome    • Knee fracture    • Wrist fracture          PAST SURGICAL HISTORY  Past Surgical History:   Procedure Laterality Date   • ABDOMINOPLASTY     • APPENDECTOMY     • CATARACT EXTRACTION     • COLONOSCOPY  unknown   • COLONOSCOPY N/A 3/29/2017    Procedure: COLONOSCOPY TO CECUM WITH COLD BIOPSIES;  Surgeon: Kyle Hidalgo MD;  Location: [facility] ENDOSCOPY;  Service:    • DILATATION AND CURETTAGE     • EXCISION BENIGN SKIN LESION SCALP / NECK / HANDS / FEET / GENITALIA      scalp-benign   • HAND SURGERY      mass between thumb and forefinger   • HYSTERECTOMY           FAMILY HISTORY  Family History   Problem Relation Age of Onset   • Heart attack Mother    • Alzheimer's disease Mother    • Glaucoma Mother    • Heart disease Mother    • Hypertension Mother    • Thyroid disease Mother    • Heart disease Father    • Alzheimer's disease Sister    • Anxiety disorder Daughter    • Bipolar disorder Daughter    • Depression Daughter    • Stroke Daughter    • Colon polyps Daughter    • Heart attack Maternal Grandmother    • Breast cancer Other    • Lung cancer Other    • Ovarian cancer Other    • Diabetes Other    • Heart disease Other    • Diabetes Cousin    • Emphysema Cousin    • Heart disease Cousin    • Multiple sclerosis Cousin    • Heart disease Other    • Nephrolithiasis Other          SOCIAL HISTORY  Social History     Socioeconomic History   • Marital status:    Tobacco Use   • Smoking status: Never Smoker   • Smokeless tobacco: Never Used   Vaping Use   • Vaping Use: Never used   Substance and Sexual Activity   • Alcohol use: No   • Drug use: No   • Sexual activity: Defer         ALLERGIES  Iodinated diagnostic agents, Other, Iodine, and Sulfa antibiotics        REVIEW OF SYSTEMS  Review of Systems     Patient denies headache, neck pain, chest pain, shortness of breath, back pain, lower extreme pain,  lower extreme swelling or focal neurodeficit  All systems reviewed and negative except for those discussed in HPI.     PHYSICAL EXAM    I have reviewed the triage vital signs and nursing notes.    ED Triage Vitals   Temp Heart Rate Resp BP SpO2   09/26/22 1540 09/26/22 1540 09/26/22 1540 09/26/22 1541 09/26/22 1540   97.7 °F (36.5 °C) 83 16 (!) 182/94 98 %      Temp src Heart Rate Source Patient Position BP Location FiO2 (%)   -- 09/26/22 1540 -- -- --    Monitor          GENERAL: 82-year-old well developed, well nourished in no acute distress  HENT: NCAT, neck supple, trachea midline  EYES: no scleral icterus, PERRL, normal conjunctivae  CV: regular rhythm, regular rate, no murmur  RESPIRATORY: unlabored effort, CTAB  ABDOMEN: soft, nontender, nondistended, bowel sounds present  MUSCULOSKELETAL: no gross deformity, no pedal edema, no calf tenderness  NEURO: alert,  sensory and motor function of extremities intact, speech clear, mental status normal  SKIN: warm, dry, no rash  PSYCH:  Appropriate mood and affect      PPE  Pt does not present with symptoms for COVID19; however, I was wearing a mask and goggles throughout all patient interaction.    Vital signs and nursing notes reviewed.      LAB RESULTS  Recent Results (from the past 24 hour(s))   Comprehensive Metabolic Panel    Collection Time: 09/26/22  4:03 PM    Specimen: Blood   Result Value Ref Range    Glucose 85 65 - 99 mg/dL    BUN 21 8 - 23 mg/dL    Creatinine 0.79 0.57 - 1.00 mg/dL    Sodium 136 136 - 145 mmol/L    Potassium 4.0 3.5 - 5.2 mmol/L    Chloride 102 98 - 107 mmol/L    CO2 23.0 22.0 - 29.0 mmol/L    Calcium 9.5 8.6 - 10.5 mg/dL    Total Protein 6.8 6.0 - 8.5 g/dL    Albumin 4.60 3.50 - 5.20 g/dL    ALT (SGPT) 24 1 - 33 U/L    AST (SGOT) 27 1 - 32 U/L    Alkaline Phosphatase 77 39 - 117 U/L    Total Bilirubin 0.4 0.0 - 1.2 mg/dL    Globulin 2.2 gm/dL    A/G Ratio 2.1 g/dL    BUN/Creatinine Ratio 26.6 (H) 7.0 - 25.0    Anion Gap 11.0 5.0 - 15.0  mmol/L    eGFR 73.9 >60.0 mL/min/1.73   Lipase    Collection Time: 09/26/22  4:03 PM    Specimen: Blood   Result Value Ref Range    Lipase 58 13 - 60 U/L   Lactic Acid, Plasma    Collection Time: 09/26/22  4:03 PM    Specimen: Blood   Result Value Ref Range    Lactate 1.1 0.5 - 2.0 mmol/L   Green Top (Gel)    Collection Time: 09/26/22  4:03 PM   Result Value Ref Range    Extra Tube Hold for add-ons.    Lavender Top    Collection Time: 09/26/22  4:03 PM   Result Value Ref Range    Extra Tube hold for add-on    Gold Top - SST    Collection Time: 09/26/22  4:03 PM   Result Value Ref Range    Extra Tube Hold for add-ons.    Light Blue Top    Collection Time: 09/26/22  4:03 PM   Result Value Ref Range    Extra Tube Hold for add-ons.    CBC Auto Differential    Collection Time: 09/26/22  4:03 PM    Specimen: Blood   Result Value Ref Range    WBC 4.52 3.40 - 10.80 10*3/mm3    RBC 4.46 3.77 - 5.28 10*6/mm3    Hemoglobin 13.8 12.0 - 15.9 g/dL    Hematocrit 39.3 34.0 - 46.6 %    MCV 88.1 79.0 - 97.0 fL    MCH 30.9 26.6 - 33.0 pg    MCHC 35.1 31.5 - 35.7 g/dL    RDW 11.8 (L) 12.3 - 15.4 %    RDW-SD 38.5 37.0 - 54.0 fl    MPV 9.3 6.0 - 12.0 fL    Platelets 221 140 - 450 10*3/mm3    Neutrophil % 66.0 42.7 - 76.0 %    Lymphocyte % 24.8 19.6 - 45.3 %    Monocyte % 8.6 5.0 - 12.0 %    Eosinophil % 0.0 (L) 0.3 - 6.2 %    Basophil % 0.4 0.0 - 1.5 %    Immature Grans % 0.2 0.0 - 0.5 %    Neutrophils, Absolute 2.98 1.70 - 7.00 10*3/mm3    Lymphocytes, Absolute 1.12 0.70 - 3.10 10*3/mm3    Monocytes, Absolute 0.39 0.10 - 0.90 10*3/mm3    Eosinophils, Absolute 0.00 0.00 - 0.40 10*3/mm3    Basophils, Absolute 0.02 0.00 - 0.20 10*3/mm3    Immature Grans, Absolute 0.01 0.00 - 0.05 10*3/mm3    nRBC 0.0 0.0 - 0.2 /100 WBC       Ordered the above labs and independently reviewed the results.        RADIOLOGY  CT Abdomen Pelvis Without Contrast    Result Date: 9/26/2022  CT ABDOMEN AND PELVIS WITHOUT IV CONTRAST  HISTORY: Diarrhea.  TECHNIQUE:   CT includes axial imaging from the lung bases to the trochanters without intravenous contrast and without use of oral contrast. Data reconstructed in coronal and sagittal planes. Radiation dose reduction techniques were utilized, including automated exposure control and exposure modulation based on body size.  COMPARISON: CT abdomen and pelvis without contrast 06/23/2022, 11/06/2015.  FINDINGS: Small calcified nodule is present in the right lower lobe. Lung bases are otherwise clear. Liver, gallbladder, spleen, adrenal glands, and pancreas exhibit normal noncontrasted CT appearance. A 4 mm posterior left renal upper pole low-density lesion is without change and may represent a tiny cyst. There is no renal or ureteral stone or hydronephrosis. There is no bowel dilatation or evidence for bowel obstruction. There is sigmoid diverticulosis without evidence for diverticulitis. Atherosclerotic calcifications are present involving the abdominal aorta and iliac vasculature. There is no ascites. Bones appear somewhat osteopenic. There is mild osteoarthritis of both hips.      Sigmoid diverticulosis without evidence for diverticulitis. No evidence for acute abnormality in the abdomen or pelvis. No renal stone or hydronephrosis.  Discussed with Dr. Diana in the emergency department on 09/26/2022 at 6:30 PM.  Radiation dose reduction techniques were utilized, including automated exposure control and exposure modulation based on body size.  This report was finalized on 9/26/2022 6:40 PM by Dr. Terence Wallace M.D.        I ordered the above noted radiological studies. Independently reviewed by me and discussed with radiologist.  See dictation above for official radiology interpretation.      PROCEDURES    Procedures        MEDICATIONS GIVEN IN ER    Medications   sodium chloride 0.9 % flush 10 mL (has no administration in time range)   lactated ringers bolus 1,000 mL (1,000 mL Intravenous New Bag 9/26/22 5311)          PROGRESS, DATA ANALYSIS, CONSULTS, AND MEDICAL DECISION MAKING    All labs have been independently reviewed by me.  All radiology studies have been reviewed by me and discussed with radiologist dictating report.   EKG's independently reviewed by me.  Discussion below represents my analysis of pertinent findings related to patient's condition, differential diagnosis, treatment plan and final disposition.      ED Course as of 09/26/22 1904   Mon Sep 26, 2022   1833 I discussed the patient's abdominal CT scan with Dr. Wallace from radiology.  He states it is negative acute with no signs of gastroenteritis, Colitis or diverticulitis. [GP]   1857 On repeat examination the patient denies complaints.  I advised her of her unremarkable work-up.  She is stable for discharge home and outpatient follow-up.  The patient understands and agrees to the plan. [GP]      ED Course User Index  [GP] Terence Diana MD           The differential diagnosis includes but is not limited to gastritis, pancreatitis, cholecystitis, appendicitis, diverticulitis, urinary tract infection, kidney stone, or bowel obstruction.          AS OF 19:04 EDT VITALS:    BP - (!) 182/94  HR - 83  TEMP - 97.7 °F (36.5 °C)  02 SATS - 98%        DIAGNOSIS  Final diagnoses:   Diarrhea, unspecified type         DISPOSITION  DISCHARGE    Patient discharged in stable condition.    Reviewed implications of results, diagnosis, meds, responsibility to follow up, warning signs and symptoms of possible worsening, potential complications and reasons to return to ER, including worsening symptoms, fever or intractable vomiting.    Patient/Family voiced understanding of above instructions.    Discussed plan for discharge, as there is no emergent indication for admission.  Pt/family is agreeable and understands need for follow up and repeat testing.  Pt is aware that discharge does not mean that nothing is wrong but it indicates no emergency is present and they must  continue care with follow-up as given below or physician of their choice.     FOLLOW-UP  Keaton Dao MD  [address]  [address]  [place] [address]  [phone]    In 2 days  If symptoms worsen                Note Disclaimer: At [facility], we believe that sharing information builds trust and better relationships. You are receiving this note because you recently visited [facility]. It is possible you will see health information before a provider has talked with you about it. This kind of information can be easy to misunderstand. To help you fully understand what it means for your health, we urge you to discuss this note with your provider.      EMR Dragon/Transcription disclaimer:   Much of this encounter note is an electronic transcription/translation of spoken language to printed text. The electronic translation of spoken language may permit erroneous, or at times, nonsensical words or phrases to be inadvertently transcribed; Although I have reviewed the note for such errors, some may still exist.           Terence Diana MD  09/26/22 1078

## 2022-09-26 NOTE — ED NOTES
Pt stated since Friday pt has had several episodes of N/V/D. Pt would break out into sweats followed by chills. Pt reports passing out after an episode of Friday.   Pt has not eaten since Thursday.

## 2022-09-26 NOTE — ED TRIAGE NOTES
Patient to ed via PV with complaints os lack of appetite, diarrhea and vomiting for the past few days. Denies fevers. PCP sent patient here with concerns for dehydration.

## 2022-09-26 NOTE — TELEPHONE ENCOUNTER
Patient called wanting to get clinical advice in regards to her having bad diarrhea over the weekend. She states that it all started around 1:30 AM on Friday/saturday. She states that she was having diarrhea and vomiting so much that she fell out of her commode and passed out. When she woke up she states that she knew where she was but was confused about the time and day. This has since then continued through the weekend and has tried OTC medication for diarrhea and has had some pedialyte and has managed to eat some toast but has continued with her nausea and diarrhea. She remembers also breaking out in a cold sweat and having chills before passing out.     I informed the patient that she should really be evaluated in person and head to her nearest ER if she is still having those symptoms. She verbalized understanding and will make her way to the ER. No further action needed at this time.     -Pari, AMY

## 2022-09-27 DIAGNOSIS — R19.7 DIARRHEA, UNSPECIFIED TYPE: Primary | ICD-10-CM

## 2022-09-29 ENCOUNTER — TELEPHONE (OUTPATIENT)
Dept: SPORTS MEDICINE | Facility: CLINIC | Age: 82
End: 2022-09-29

## 2022-09-29 ENCOUNTER — PATIENT OUTREACH (OUTPATIENT)
Dept: CASE MANAGEMENT | Facility: OTHER | Age: 82
End: 2022-09-29

## 2022-09-29 NOTE — OUTREACH NOTE
AMBULATORY CASE MANAGEMENT NOTE    Name and Relationship of Patient/Support Person: Jael Navarro - Self    Patient Outreach  RN-ACM outreach with patient.  Discussed 9/26/22 ED visit regarding diarrhea/vomiting. Patient treated and discharged home. Patient states to be compliant with ED recommendations; states symptoms have improved and states to have contacted PCP for recommendations. Patient voiced intent to contact to PCP regarding confirmation of GI evaluation appointment. Patient states improvement regarding diarrhea; nausea; vomiting and appetite has improved. Patient states to be tolerating  small amounts of food and fluids.  Patient states to have no difficulty with fever; falls; chest pain; SOB or sleeping. Patient states to be compliant with medications. Reviewed with patient education; 24/7 Nurse Line Telephone number and medical appointments. Patient verbalized understanding and states to appreciate patient outreach. No further questions voiced at this time.        Education Documentation  Unresolved/Worsening Symptoms, taught by Hazel Newell RN at 9/29/2022 10:57 AM.  Learner: Patient  Readiness: Acceptance  Method: Explanation  Response: Verbalizes Understanding    Unresolved/Worsening Symptoms, taught by Hazel Newell RN at 9/29/2022 10:57 AM.  Learner: Patient  Readiness: Acceptance  Method: Explanation  Response: Verbalizes Understanding    Medication Management, taught by Hazel Newell RN at 9/29/2022 10:57 AM.  Learner: Patient  Readiness: Acceptance  Method: Explanation  Response: Verbalizes Understanding    Hygiene, taught by Hazel Newell RN at 9/29/2022 10:57 AM.  Learner: Patient  Readiness: Acceptance  Method: Explanation  Response: Verbalizes Understanding    Food Handling, taught by Hazel Newell RN at 9/29/2022 10:57 AM.  Learner: Patient  Readiness: Acceptance  Method: Explanation  Response: Verbalizes Understanding    Diet Modification, taught by  Hazel Newell RN at 9/29/2022 10:57 AM.  Learner: Patient  Readiness: Acceptance  Method: Explanation  Response: Verbalizes Understanding    Unresolved/Worsening Symptoms, taught by Hazel Newell RN at 9/29/2022 10:57 AM.  Learner: Patient  Readiness: Acceptance  Method: Explanation  Response: Verbalizes Understanding    Safety, taught by Hazel Newell RN at 9/29/2022 10:57 AM.  Learner: Patient  Readiness: Acceptance  Method: Explanation  Response: Verbalizes Understanding    Provider Follow-Up, taught by Hazel Newell RN at 9/29/2022 10:57 AM.  Learner: Patient  Readiness: Acceptance  Method: Explanation  Response: Verbalizes Understanding    Blood Pressure Monitoring, taught by Hazel Newell RN at 9/29/2022 10:57 AM.  Learner: Patient  Readiness: Acceptance  Method: Explanation  Response: Verbalizes Understanding          HAZEL OBANDO  Ambulatory Case Management    9/29/2022, 10:57 EDT

## 2022-10-03 DIAGNOSIS — Z12.31 BREAST CANCER SCREENING BY MAMMOGRAM: ICD-10-CM

## 2022-10-03 NOTE — TELEPHONE ENCOUNTER
[facility] called asking for Mammo order be faxed to [phone].    I informed them that this order has been faxed several times however I will refax it again to avoid having the patient rescheduled.

## 2022-10-11 ENCOUNTER — OFFICE VISIT (OUTPATIENT)
Dept: GASTROENTEROLOGY | Facility: CLINIC | Age: 82
End: 2022-10-11

## 2022-10-11 VITALS
BODY MASS INDEX: 19.7 KG/M2 | SYSTOLIC BLOOD PRESSURE: 165 MMHG | WEIGHT: 115.4 LBS | TEMPERATURE: 97.5 F | HEIGHT: 64 IN | HEART RATE: 75 BPM | DIASTOLIC BLOOD PRESSURE: 61 MMHG

## 2022-10-11 DIAGNOSIS — K51.519 LEFT SIDED COLITIS WITH UNSPECIFIED COMPLICATIONS: ICD-10-CM

## 2022-10-11 DIAGNOSIS — R19.7 DIARRHEA OF PRESUMED INFECTIOUS ORIGIN: Primary | ICD-10-CM

## 2022-10-11 PROCEDURE — 99203 OFFICE O/P NEW LOW 30 MIN: CPT | Performed by: INTERNAL MEDICINE

## 2022-10-11 NOTE — PROGRESS NOTES
Chief Complaint   Patient presents with   • Diarrhea     Jael Navarro is a 82 y.o. female who presents with a 2-week history of diarrhea  HPI     Patient 82-year-old female with history of hypothyroid, degenerative disc disease and allergic rhinitis as well as hypertension complaining of sudden onset diarrhea about 2-1/2 weeks ago.  Patient seen in the ER with normal CBC and CMP and CAT scan with diverticulosis with ongoing symptoms with fecal incontinence here for further recommendations.  Patient denies any fever chills no nausea vomiting did have some early weight loss now weight stable.    Past Medical History:   Diagnosis Date   • Allergic rhinitis    • Degeneration, intervertebral disc, thoracic    • Disease of thyroid gland    • Fracture of ankle    • Gingival and periodontal disease    • Hypertension    • Irritable bowel syndrome    • Knee fracture    • Wrist fracture        Current Outpatient Medications:   •  cilostazol (PLETAL) 50 MG tablet, Take 1 tablet by mouth 2 (Two) Times a Day Before Meals., Disp: 180 tablet, Rfl: 3  •  levothyroxine (SYNTHROID, LEVOTHROID) 50 MCG tablet, Take 1 tablet by mouth Daily., Disp: 90 tablet, Rfl: 1  •  Multiple Vitamin (MULTI-VITAMIN) tablet, Take by mouth., Disp: , Rfl:   •  carboxymethylcellulose (REFRESH PLUS) 0.5 % solution, 1 drop 2 (Two) Times a Day As Needed for Dry Eyes., Disp: , Rfl:   Allergies   Allergen Reactions   • Iodinated Diagnostic Agents Anaphylaxis   • Other Anaphylaxis     CONTRAST DYE  CONTRAST DYE  CONTRAST DYE   • Iodine    • Sulfa Antibiotics Rash     Social History     Socioeconomic History   • Marital status:    Tobacco Use   • Smoking status: Never   • Smokeless tobacco: Never   Vaping Use   • Vaping Use: Never used   Substance and Sexual Activity   • Alcohol use: No   • Drug use: No   • Sexual activity: Defer     Family History   Problem Relation Age of Onset   • Heart attack Mother    • Alzheimer's disease Mother    •  Glaucoma Mother    • Heart disease Mother    • Hypertension Mother    • Thyroid disease Mother    • Heart disease Father    • Alzheimer's disease Sister    • Anxiety disorder Daughter    • Bipolar disorder Daughter    • Depression Daughter    • Stroke Daughter    • Colon polyps Daughter    • Heart attack Maternal Grandmother    • Breast cancer Other    • Lung cancer Other    • Ovarian cancer Other    • Diabetes Other    • Heart disease Other    • Diabetes Cousin    • Emphysema Cousin    • Heart disease Cousin    • Multiple sclerosis Cousin    • Heart disease Other    • Nephrolithiasis Other      Review of Systems   Constitutional: Negative.    HENT: Negative.    Eyes: Negative.    Respiratory: Negative.    Cardiovascular: Negative.    Gastrointestinal: Positive for diarrhea. Negative for abdominal distention, abdominal pain, anal bleeding, blood in stool, constipation, nausea, rectal pain and vomiting.   Endocrine: Negative.    Musculoskeletal: Negative.    Skin: Negative.    Allergic/Immunologic: Negative.    Hematological: Negative.      Vitals:    10/11/22 1357   BP: 165/61   Pulse: 75   Temp: 97.5 °F (36.4 °C)     Physical Exam  Vitals and nursing note reviewed.   Constitutional:       Appearance: Normal appearance. She is well-developed and normal weight.   HENT:      Head: Normocephalic and atraumatic.   Eyes:      General: No scleral icterus.     Pupils: Pupils are equal, round, and reactive to light.   Cardiovascular:      Rate and Rhythm: Normal rate and regular rhythm.      Heart sounds: Normal heart sounds. No murmur heard.    No friction rub. No gallop.   Pulmonary:      Effort: Pulmonary effort is normal.      Breath sounds: Normal breath sounds. No wheezing or rales.   Abdominal:      General: Bowel sounds are normal. There is no distension or abdominal bruit.      Palpations: Abdomen is soft. Abdomen is not rigid. There is no shifting dullness, fluid wave, mass or pulsatile mass.      Tenderness:  There is no abdominal tenderness. There is no guarding.      Hernia: No hernia is present.   Musculoskeletal:         General: No swelling or tenderness. Normal range of motion.      Cervical back: Normal range of motion and neck supple. No rigidity.   Skin:     General: Skin is warm and dry.      Coloration: Skin is not jaundiced.      Findings: No rash.   Neurological:      General: No focal deficit present.      Mental Status: She is alert and oriented to person, place, and time.      Cranial Nerves: No cranial nerve deficit.   Psychiatric:         Behavior: Behavior normal.         Thought Content: Thought content normal.       Diagnoses and all orders for this visit:    Diarrhea of presumed infectious origin  -     Gastrointestinal Panel, PCR - Stool, Per Rectum  -     Clostridioides difficile Toxin, PCR - Stool, Per Rectum    Left sided colitis with unspecified complications (HCC)  -     Gastrointestinal Panel, PCR - Stool, Per Rectum    Patient 82-year-old female with history of hypertension, hypothyroid with degenerative disc disease complaining of approximately 2-1/2 weeks of diarrhea.  Patient reports diarrhea began acutely denies any fever chills no bright red blood per rectum or melena.  Patient seen in the ER due to weakness and fatigue noted with normal labs and discharged without further evaluation.  Stool testing orders were placed in the chart but never done.  Patient referred to GI for further recommendations.  At this point we will proceed with stool PCR as well as C. difficile testing.  We will begin Pepto-Bismol 4 times daily and follow-up clinically.  Patient to call if no improvement.

## 2022-10-13 LAB — C DIFF TOX GENS STL QL NAA+PROBE: NEGATIVE

## 2022-10-14 ENCOUNTER — TELEPHONE (OUTPATIENT)
Dept: GASTROENTEROLOGY | Facility: CLINIC | Age: 82
End: 2022-10-14

## 2022-10-14 ENCOUNTER — PATIENT OUTREACH (OUTPATIENT)
Dept: CASE MANAGEMENT | Facility: OTHER | Age: 82
End: 2022-10-14

## 2022-10-14 NOTE — TELEPHONE ENCOUNTER
----- Message from Clementine Reyes sent at 10/14/2022 11:10 AM EDT -----  Contact: [phone]  She wants a call back about results from her ct scan from er visit she said they didn't tell her either way . She also wants to know about stool results.

## 2022-10-14 NOTE — OUTREACH NOTE
AMBULATORY CASE MANAGEMENT NOTE    Name and Relationship of Patient/Support Person: Jael Navarro - Self    Patient Outreach  RN-ACM outreach with patient. Patient states to have episodes of diarrhea; has had 10/11/22 GI appointment and received recommendations. Patient is taking PeptoBismol as directed and has contacted Dr. Hidalgo's office for follow up and recommendations. Patient states to be tolerating fluids; appetite improving and has intermittent discomfort to back alleviated with laying down. Patient states improvement with dizziness; urinary incontinence; ambulatng without cane and no difficulty with chest pain SOB; or sleeping.Patient states to be compliant with medications; appointments and monitoring of blood pressure (WNL's).  Reviewed with patient education and medical appointments. Patient verbalized understanding. No further questions or concerns voiced at this times. Additional outreach scheduled.      Education Documentation  Unresolved/Worsening Symptoms, taught by Hazel Newell RN at 10/14/2022 12:06 PM.  Learner: Patient  Readiness: Acceptance  Method: Explanation  Response: Verbalizes Understanding    Unresolved/Worsening Symptoms, taught by Hazel Newell RN at 10/14/2022 12:06 PM.  Learner: Patient  Readiness: Acceptance  Method: Explanation  Response: Verbalizes Understanding    Medication Management, taught by Hazel Newell RN at 10/14/2022 12:06 PM.  Learner: Patient  Readiness: Acceptance  Method: Explanation  Response: Verbalizes Understanding    Diet Modification, taught by Hazel Newell RN at 10/14/2022 12:06 PM.  Learner: Patient  Readiness: Acceptance  Method: Explanation  Response: Verbalizes Understanding    Provider Follow-Up, taught by Hazel Newell RN at 10/14/2022 12:06 PM.  Learner: Patient  Readiness: Acceptance  Method: Explanation  Response: Verbalizes Understanding          HAZEL OBANDO  Ambulatory Case Management    10/14/2022, 12:06 EDT

## 2022-10-16 LAB

## 2022-10-18 ENCOUNTER — TELEPHONE (OUTPATIENT)
Dept: GASTROENTEROLOGY | Facility: CLINIC | Age: 82
End: 2022-10-18

## 2022-10-18 NOTE — TELEPHONE ENCOUNTER
Caller: Jael Navarro    Relationship: Self    Best call back number: [phone]    What is the best time to reach you: ANYTIME    Who are you requesting to speak with (clinical staff, provider,  specific staff member): CLINICAL       What was the call regarding: PT WANTS TEST RESULTS

## 2022-10-20 ENCOUNTER — TELEPHONE (OUTPATIENT)
Dept: GASTROENTEROLOGY | Facility: CLINIC | Age: 82
End: 2022-10-20

## 2022-10-20 NOTE — TELEPHONE ENCOUNTER
Returned call, pt was asking ab her stool study.  Looks like it came back showing Norovirus.  Got an update on her symptoms.  She said she is feeling better, but not 100%.  She is still having some diarrhea, but it is no longer explosive.  Her nausea has resolved and she is trying to keep herself hydrated.    Is there anything you would like for me to pass on to her at this time?

## 2022-10-20 NOTE — TELEPHONE ENCOUNTER
Caller: Jael Navarro    Relationship: Self    Best call back number:  [phone]    What is the best time to reach you: ANYTIME    Who are you requesting to speak with (clinical staff, provider,  specific staff member): CLINICAL STAFF/DR SCHNEIDER    What was the call regarding: PT IS WAITING FOR TEST RESULTS AND SHE IS UPSET AND ANXIOUS. SHE STILL HAS HER PROBLEM NOT AS SEVERE BUT STILL GOING ON.    Do you require a callback: YES

## 2022-10-26 ENCOUNTER — TELEPHONE (OUTPATIENT)
Dept: GASTROENTEROLOGY | Facility: CLINIC | Age: 82
End: 2022-10-26

## 2022-10-26 NOTE — TELEPHONE ENCOUNTER
Caller: Jael Navarro    Relationship to patient: Self    Best call back number: [phone]    Patient is needing: PATIENT ASKING ABOUT NEXT STEPS AFTER BEING DIAGNOSED WITH NOROVIRUS. PATIENT WOULD LIKE A CALL BACK TO DISCUSS THOSE STEPS.

## 2022-10-31 NOTE — TELEPHONE ENCOUNTER
Per Dr. Hidalgo:  CT was done in ER before we saw her and showed diverticulosis, otherwise normal, see stool results.      Norvo virus has no treatment other trhan symptom control.  Expect resolution of symptoms but can take up to 8 weeks to fully resolve, let us know how she is doing.

## 2022-10-31 NOTE — TELEPHONE ENCOUNTER
"Patient called. Advised as per Dr. Hidalgo's note. She states she has good days and bad days with her diarrhea.   She states yesterday was a good day and today she has had one loose stool so far.     She questions how long she can be on Pepto? States she has the [employer] brand which lists ASA as an ingredient. She is on Cilostazol.     She states she has had diarrhea for about 3 months and is \"tired of having loose stools\".   Advised will send an update to [facility]. She verb understanding.   "

## 2022-11-11 ENCOUNTER — PATIENT OUTREACH (OUTPATIENT)
Dept: CASE MANAGEMENT | Facility: OTHER | Age: 82
End: 2022-11-11

## 2022-11-11 NOTE — OUTREACH NOTE
AMBULATORY CASE MANAGEMENT NOTE    Name and Relationship of Patient/Support Person: Jael Navarro - Self    Patient Outreach  RN-ACM outreach with patient. Patient states improvement regarding diarrhea; appetite; night sweats; drinking of fluids; and no difficulty with chest pain ; SOB or sleeping. Patient states to have had fall about one week ago;had fallen backwards  no LOC; head injury and was able to get up independently. Patient states to have area of bruising to right buttocks and right leg. Patient states to have no difficulty with numbness; tingling and ambulating without cane. Patient states voiced intent to contact [facility] regarding Life Alert necklace. Patient states to be compliant with medications; taking [employer] probiotic daily (OTC); and continues with nightly urinary incontinence and voiced intent to discuss with physician. Patient voiced intent to resume monitoring of blood pressure. Reviewed with patient education; gaps of care; 24/7 Nurse Line Telephone number and Advance Directives( voiced intent to completed). Patient verbalized understanding.  Patient states to have appreciated patient outreach. Patient has met care plan goals, care gaps have been addressed, and patient has a strong sense of health self-management. No further questions voiced at this time.     Education Documentation  Medication Management, taught by Hazel Newell RN at 11/11/2022  2:40 PM.  Learner: Patient  Readiness: Acceptance  Method: Explanation  Response: Verbalizes Understanding    Diet Modification, taught by Hazel Newell RN at 11/11/2022  2:40 PM.  Learner: Patient  Readiness: Acceptance  Method: Explanation  Response: Verbalizes Understanding    Unresolved/Worsening Symptoms, taught by Hazel Newell RN at 11/11/2022  2:40 PM.  Learner: Patient  Readiness: Acceptance  Method: Explanation  Response: Verbalizes Understanding    Safety, taught by Hzael Newell RN at 11/11/2022   2:40 PM.  Learner: Patient  Readiness: Acceptance  Method: Explanation  Response: Verbalizes Understanding    Unresolved/Worsening Symptoms, taught by Hazel Newell RN at 11/11/2022  2:40 PM.  Learner: Patient  Readiness: Acceptance  Method: Explanation  Response: Verbalizes Understanding    Fluid/Food Intake, taught by Hazel Newell RN at 11/11/2022  2:40 PM.  Learner: Patient  Readiness: Acceptance  Method: Explanation  Response: Verbalizes Understanding    Equipment Use, taught by Hazel Newell RN at 11/11/2022  2:40 PM.  Learner: Patient  Readiness: Acceptance  Method: Explanation  Response: Verbalizes Understanding    Bladder Program, taught by Hazel Newell RN at 11/11/2022  2:40 PM.  Learner: Patient  Readiness: Acceptance  Method: Explanation  Response: Verbalizes Understanding    Provider Follow-Up, taught by Hazel Newell RN at 11/11/2022  2:40 PM.  Learner: Patient  Readiness: Acceptance  Method: Explanation  Response: Verbalizes Understanding    Medication Management, taught by Hazel Newell RN at 11/11/2022  2:40 PM.  Learner: Patient  Readiness: Acceptance  Method: Explanation  Response: Verbalizes Understanding    Blood Pressure Monitoring, taught by Hazel Newell RN at 11/11/2022  2:40 PM.  Learner: Patient  Readiness: Acceptance  Method: Explanation  Response: Verbalizes Understanding          HAZEL OBANDO  Ambulatory Case Management    11/11/2022, 14:40 EST

## 2022-12-09 ENCOUNTER — TELEPHONE (OUTPATIENT)
Dept: SPORTS MEDICINE | Facility: CLINIC | Age: 82
End: 2022-12-09

## 2022-12-09 NOTE — TELEPHONE ENCOUNTER
Patient called and states that she would like to get 's recommendation on a monitoring system like life alert, something that she can wear around her neck because she has noticed that her balance is getting worse and she fell on her patio a couple of weeks ago. She states that she managed to get up but did not go to the ER. She would like to know what your thought's are. Please advise, thank you!      -AMY Yañez

## 2022-12-13 NOTE — TELEPHONE ENCOUNTER
Patient returned call to the office, I relayed Dr. Dao's recommendations. She went ahead and ordered a life alert system for herself that would be covered with her insurance.   All information was verbally understood.     Thanks  Mariam

## 2022-12-13 NOTE — TELEPHONE ENCOUNTER
Attempted to contact patient via phone with no answer. I left a voicemail for her to return call to office if she still needs recommendation.     Thanks  Mariam

## 2022-12-28 ENCOUNTER — TELEPHONE (OUTPATIENT)
Dept: SPORTS MEDICINE | Facility: CLINIC | Age: 82
End: 2022-12-28

## 2022-12-28 DIAGNOSIS — E03.9 ACQUIRED HYPOTHYROIDISM: Primary | ICD-10-CM

## 2022-12-28 NOTE — TELEPHONE ENCOUNTER
Patient called requesting a referral to Endocrinology, Dr. Anali Carranza,  for her thyroid issues.     Please advise,

## 2023-01-20 ENCOUNTER — LAB (OUTPATIENT)
Dept: LAB | Facility: HOSPITAL | Age: 83
End: 2023-01-20
Payer: MEDICARE

## 2023-01-20 ENCOUNTER — OFFICE VISIT (OUTPATIENT)
Dept: SPORTS MEDICINE | Facility: CLINIC | Age: 83
End: 2023-01-20
Payer: MEDICARE

## 2023-01-20 VITALS
TEMPERATURE: 98.4 F | HEART RATE: 70 BPM | OXYGEN SATURATION: 98 % | DIASTOLIC BLOOD PRESSURE: 78 MMHG | SYSTOLIC BLOOD PRESSURE: 150 MMHG

## 2023-01-20 DIAGNOSIS — R35.0 URINE FREQUENCY: ICD-10-CM

## 2023-01-20 DIAGNOSIS — M25.512 ACUTE PAIN OF LEFT SHOULDER: Primary | ICD-10-CM

## 2023-01-20 LAB
BACTERIA UR QL AUTO: ABNORMAL /HPF
BILIRUB UR QL STRIP: NEGATIVE
CLARITY UR: CLEAR
COLOR UR: YELLOW
GLUCOSE UR STRIP-MCNC: NEGATIVE MG/DL
HGB UR QL STRIP.AUTO: ABNORMAL
HYALINE CASTS UR QL AUTO: ABNORMAL /LPF
KETONES UR QL STRIP: NEGATIVE
LEUKOCYTE ESTERASE UR QL STRIP.AUTO: NEGATIVE
NITRITE UR QL STRIP: NEGATIVE
PH UR STRIP.AUTO: 5.5 [PH] (ref 5–8)
PROT UR QL STRIP: NEGATIVE
RBC # UR STRIP: ABNORMAL /HPF
REF LAB TEST METHOD: ABNORMAL
SP GR UR STRIP: 1.01 (ref 1–1.03)
SQUAMOUS #/AREA URNS HPF: ABNORMAL /HPF
UROBILINOGEN UR QL STRIP: ABNORMAL
WBC # UR STRIP: ABNORMAL /HPF

## 2023-01-20 PROCEDURE — 99214 OFFICE O/P EST MOD 30 MIN: CPT | Performed by: FAMILY MEDICINE

## 2023-01-20 PROCEDURE — 81001 URINALYSIS AUTO W/SCOPE: CPT

## 2023-01-20 NOTE — PROGRESS NOTES
"Chief Complaint  Shoulder Pain (LT SHOULDER- fell and thinks she may have fractured scapula. )    Subjective        Jael Navarro presents to [facility]  History of Present Illness  1.  Yesterday patient was getting out of bed, slipped, sliding down to her back and the corner of a nightstand and hit her in the right scapular area.  She wanted to make sure she did not have a scapular fracture.  Pain is worse with direct pressure over for flexion of the shoulder.  2.  Patient has been noticing some dysuria.  She is concerned that her temperature here is 98.4, she states this could be a fever because her temperature is usually 97.    Objective   Vital Signs:  /78 (BP Location: Right arm, Patient Position: Sitting, Cuff Size: Adult)   Pulse 70   Temp 98.4 °F (36.9 °C) (Temporal)   SpO2 98%   Estimated body mass index is 19.81 kg/m² as calculated from the following:    Height as of 10/11/22: 162.6 cm (64\").    Weight as of 10/11/22: 52.3 kg (115 lb 6.4 oz).       BMI is within normal parameters. No other follow-up for BMI required.      Physical Exam  Vitals reviewed.   Constitutional:       Appearance: She is well-developed.   HENT:      Head: Normocephalic and atraumatic.   Eyes:      Conjunctiva/sclera: Conjunctivae normal.      Pupils: Pupils are equal, round, and reactive to light.   Cardiovascular:      Comments: No peripheral edema  Pulmonary:      Effort: Pulmonary effort is normal.   Musculoskeletal:      Comments: There is a small area of ecchymosis inferior body right scapula with tenderness in this area.  Patient has good range of motion of the shoulder.  No tenderness over the AC joint or the humeral head.   Skin:     General: Skin is warm and dry.   Neurological:      Mental Status: She is alert and oriented to person, place, and time.   Psychiatric:         Behavior: Behavior normal.        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  No acute fractures noted.  No prior studies were available for comparison.       Assessment and Plan   Diagnoses and all orders for this visit:    1. Acute pain of left shoulder (Primary)  -     Cancel: XR Shoulder 2+ View Left    2. Urine frequency  -     Urinalysis With Culture If Indicated -; Future    1.  Contusion right scapula-we will treat with conservative measures at home, follow-up if pain is not improving over the next 10 to 14 days or sooner if needed.  2.  We will check UA today.       I spent 32 minutes caring for Jael on this date of service. This time includes time spent by me in the following activities:reviewing tests, obtaining and/or reviewing a separately obtained history, performing a medically appropriate examination and/or evaluation , counseling and educating the patient/family/caregiver, ordering medications, tests, or procedures and documenting information in the medical record  Follow Up   No follow-ups on file.  Patient was given instructions and counseling regarding her condition or for health maintenance advice. Please see specific information pulled into the AVS if appropriate.

## 2023-01-24 ENCOUNTER — TELEPHONE (OUTPATIENT)
Dept: SPORTS MEDICINE | Facility: CLINIC | Age: 83
End: 2023-01-24
Payer: MEDICARE

## 2023-01-24 DIAGNOSIS — S40.011A CONTUSION OF RIGHT SCAPULAR REGION, INITIAL ENCOUNTER: ICD-10-CM

## 2023-01-24 DIAGNOSIS — M25.512 ACUTE PAIN OF LEFT SHOULDER: Primary | ICD-10-CM

## 2023-01-24 NOTE — TELEPHONE ENCOUNTER
Patient called and is having pain with certain movements. She would like a request for Physical therapy to help with her pain and range of motion. She would like to attend PT at [facility] [place]. She said their fax number is [phone].     -SARAH Pedraza

## 2023-02-07 ENCOUNTER — TELEPHONE (OUTPATIENT)
Dept: SPORTS MEDICINE | Facility: CLINIC | Age: 83
End: 2023-02-07
Payer: MEDICARE

## 2023-02-07 RX ORDER — AMOXICILLIN AND CLAVULANATE POTASSIUM 875; 125 MG/1; MG/1
1 TABLET, FILM COATED ORAL 2 TIMES DAILY
Qty: 20 TABLET | Refills: 0 | Status: SHIPPED | OUTPATIENT
Start: 2023-02-07 | End: 2023-03-16

## 2023-02-07 NOTE — TELEPHONE ENCOUNTER
Do you want us to put her on for a telephone visit or do you want to send something in. Thank you.      -AMY Yañez

## 2023-02-07 NOTE — TELEPHONE ENCOUNTER
Patient called and stated that she is having chills and believes that she has a sinus infection. I informed the patient that I need to double check to see if she could be seen in the office before I scheduled her. Patient asked if she could speak with a MA. I was unable to transfer patient to MA. I informed the patient that I would send a message. Patient stated that she does not have a fever and she has a sample that Dr. Dao can test but she knows that she has a sinus infection and that she is also having an issue with her legs that she would like to discuss that as well. Patient stated that she would like a call back.    Please advise if its okay to schedule for in person or telephone call.     Tawnya

## 2023-02-07 NOTE — TELEPHONE ENCOUNTER
Returned call.   - Rhinorrhea, no fever, thick mucus. No HA. Started over a week ago. Will rx abx for presumed sinusitis.     - Working with PT on shoulder pain and fall risk.

## 2023-02-08 ENCOUNTER — TELEPHONE (OUTPATIENT)
Dept: SPORTS MEDICINE | Facility: CLINIC | Age: 83
End: 2023-02-08
Payer: MEDICARE

## 2023-02-08 NOTE — TELEPHONE ENCOUNTER
Patient called asking what time frame you recommends for quarantine. Her physical therapist who she saw yesterday tested positive for covid 19 today.     Please advise.

## 2023-02-09 NOTE — TELEPHONE ENCOUNTER
I called the patient and informed her that there is no protocol for her to quarantine for exposure to COVID-19 only if she is symptomatic and if she tests positive for COVID-19. Patient verbally understood the information and thanked  for replying back. She had no further requests at this time. Thank you!      -AMY Yañez

## 2023-02-10 ENCOUNTER — TELEPHONE (OUTPATIENT)
Dept: SPORTS MEDICINE | Facility: CLINIC | Age: 83
End: 2023-02-10
Payer: MEDICARE

## 2023-02-10 RX ORDER — ONDANSETRON 4 MG/1
4 TABLET, ORALLY DISINTEGRATING ORAL EVERY 8 HOURS PRN
Qty: 12 TABLET | Refills: 1 | Status: SHIPPED | OUTPATIENT
Start: 2023-02-10 | End: 2023-03-16

## 2023-02-10 RX ORDER — AZITHROMYCIN 250 MG/1
TABLET, FILM COATED ORAL
Qty: 6 TABLET | Refills: 0 | Status: SHIPPED | OUTPATIENT
Start: 2023-02-10 | End: 2023-03-16

## 2023-02-10 NOTE — TELEPHONE ENCOUNTER
Patient called in stating she has had severe n/v and diarrhea due to the Augmentin prescribed.   Wants to know if this can be changed to something else and also given something for the n/v     Thanks  Mariam

## 2023-02-13 NOTE — TELEPHONE ENCOUNTER
Called and spoke with patient, she confirmed she received rx's and has had no problems with them.    Thanks  Mariam

## 2023-03-16 ENCOUNTER — OFFICE VISIT (OUTPATIENT)
Dept: SPORTS MEDICINE | Facility: CLINIC | Age: 83
End: 2023-03-16
Payer: MEDICARE

## 2023-03-16 VITALS
DIASTOLIC BLOOD PRESSURE: 64 MMHG | HEART RATE: 74 BPM | TEMPERATURE: 98.1 F | SYSTOLIC BLOOD PRESSURE: 152 MMHG | OXYGEN SATURATION: 99 % | WEIGHT: 117 LBS | HEIGHT: 64 IN | BODY MASS INDEX: 19.97 KG/M2

## 2023-03-16 DIAGNOSIS — R29.6 FREQUENT FALLS: ICD-10-CM

## 2023-03-16 DIAGNOSIS — R26.89 BALANCE PROBLEM: Primary | ICD-10-CM

## 2023-03-16 DIAGNOSIS — R63.4 WEIGHT LOSS: ICD-10-CM

## 2023-03-16 PROCEDURE — 99214 OFFICE O/P EST MOD 30 MIN: CPT | Performed by: FAMILY MEDICINE

## 2023-03-16 PROCEDURE — 3077F SYST BP >= 140 MM HG: CPT | Performed by: FAMILY MEDICINE

## 2023-03-16 PROCEDURE — 3078F DIAST BP <80 MM HG: CPT | Performed by: FAMILY MEDICINE

## 2023-03-16 NOTE — PROGRESS NOTES
"Jael is a 82 y.o. year old female    Chief Complaint   Patient presents with   • Fall     Patient states that she has fallen 8 times since Feb of this year and wanted to discuss this with . She would also like to go over her medications with  due to the specialist she has seen today. PT informed her that the crystals in her ears are out of line and wanted to discuss this as well       History of Present Illness   HPI   Here to follow-up on multiple medical concerns.  First concern, she has had 8 falls since February.  She is concerned about her long-term risk because of this.  She describes some difficulty maintaining her balance particularly with changing positions.    Describes ongoing tailbone pain since falling in November.    Concerned about ongoing gastrointestinal complaints.  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  She is eating a relatively normal diet, but is struggling to maintain her weight.    Also having ongoing testing with vascular surgery for ongoing leg complaints.      Review of Systems    /64 (BP Location: Left arm, Patient Position: Sitting, Cuff Size: Adult)   Pulse 74   Temp 98.1 °F (36.7 °C) (Temporal)   Ht 162.6 cm (64.02\")   Wt 53.1 kg (117 lb)   SpO2 99%   BMI 20.07 kg/m²          Physical Exam  Vitals reviewed.   Constitutional:       Appearance: Normal appearance. She is not ill-appearing or diaphoretic.   Eyes:      Pupils: Pupils are equal, round, and reactive to light.   Cardiovascular:      Rate and Rhythm: Normal rate and regular rhythm.      Pulses: Normal pulses.      Heart sounds: Normal heart sounds.   Pulmonary:      Effort: Pulmonary effort is normal.      Breath sounds: Normal breath sounds.   Neurological:      General: No focal deficit present.      Mental Status: She is alert and oriented to person, place, and time.      Cranial Nerves: No cranial nerve deficit.      Coordination: Coordination normal.      Deep Tendon Reflexes: " Reflexes normal.      Comments: She has a somewhat unstable gait without focal deficit.  Negative Romberg.   Psychiatric:         Mood and Affect: Mood normal.         Thought Content: Thought content normal.         Judgment: Judgment normal.           Current Outpatient Medications:   •  cilostazol (PLETAL) 50 MG tablet, Take 1 tablet by mouth 2 (Two) Times a Day Before Meals., Disp: 180 tablet, Rfl: 3  •  levothyroxine (SYNTHROID, LEVOTHROID) 50 MCG tablet, Take 1 tablet by mouth Daily., Disp: 90 tablet, Rfl: 1  •  Multiple Vitamin (MULTI-VITAMIN) tablet, Take by mouth., Disp: , Rfl:   •  carboxymethylcellulose (REFRESH PLUS) 0.5 % solution, 1 drop 2 (Two) Times a Day As Needed for Dry Eyes., Disp: , Rfl:      Diagnoses and all orders for this visit:    Balance problem  -     Ambulatory Referral to Physical Therapy Evaluate and treat, Vestibular  -     MRI brain wo contrast; Future    Weight loss  -     MRI brain wo contrast; Future    Frequent falls  -     MRI brain wo contrast; Future       Based on her difficulty maintaining her balance and fall risk I think we should arrange vestibular therapy for further evaluation.  Also based on this worsening balance difficulty I think we should rule out occult process with brain MRI.  Continue follow-up with GI and vascular surgery.        EMR Dragon/Transcription disclaimer:    Much of this encounter note is an electronic transcription/translation of spoken language to printed text.  The electronic translation of spoken language may permit erroneous, or at times, nonsensical words or phrases to be inadvertently transcribed.  Although I have reviewed the note for such errors some may still exist.

## 2023-03-28 ENCOUNTER — TELEPHONE (OUTPATIENT)
Dept: SPORTS MEDICINE | Facility: CLINIC | Age: 83
End: 2023-03-28
Payer: MEDICARE

## 2023-03-28 DIAGNOSIS — M25.559 HIP PAIN: ICD-10-CM

## 2023-03-28 DIAGNOSIS — M79.605 LEFT LEG PAIN: ICD-10-CM

## 2023-03-28 DIAGNOSIS — R29.6 FREQUENT FALLS: Primary | ICD-10-CM

## 2023-03-28 NOTE — TELEPHONE ENCOUNTER
Patient called. Is going back to physical therapy Thursday (for balance). Has been having issues with left leg/hip area, difficult to lift. Wants to know if can have it added to her PT to work on her left leg there too.     Please advise.     Thanks,  Trudy NICHOLE

## 2023-04-01 ENCOUNTER — HOSPITAL ENCOUNTER (OUTPATIENT)
Dept: MRI IMAGING | Facility: HOSPITAL | Age: 83
Discharge: HOME | End: 2023-04-01
Admitting: FAMILY MEDICINE
Payer: MEDICARE

## 2023-04-01 DIAGNOSIS — R63.4 WEIGHT LOSS: ICD-10-CM

## 2023-04-01 DIAGNOSIS — R29.6 FREQUENT FALLS: ICD-10-CM

## 2023-04-01 DIAGNOSIS — R26.89 BALANCE PROBLEM: ICD-10-CM

## 2023-04-01 PROCEDURE — 70551 MRI BRAIN STEM W/O DYE: CPT

## 2023-04-03 DIAGNOSIS — R29.6 FREQUENT FALLS: ICD-10-CM

## 2023-04-03 DIAGNOSIS — R26.89 BALANCE PROBLEM: ICD-10-CM

## 2023-04-03 DIAGNOSIS — I77.9 VERTEBRAL ARTERY DISEASE: Primary | ICD-10-CM

## 2023-04-04 ENCOUNTER — TELEPHONE (OUTPATIENT)
Dept: SPORTS MEDICINE | Facility: CLINIC | Age: 83
End: 2023-04-04
Payer: MEDICARE

## 2023-04-04 NOTE — TELEPHONE ENCOUNTER
I spoke with Danny, Radiologist at [facility]. He stated that CT dye is completely different from MRI dye and she should not have any issues with it. He also stated that if preferred, patient can take 2 benadryl pills before appointment and she can bring someone to be with her during the process.      Called and informed patient of the above information, she has verbalized understanding.

## 2023-04-04 NOTE — TELEPHONE ENCOUNTER
Patient called back in regards to her MRI results. She states that she would like to note that she had anaphylactic shock with contrast dye and wanted to let  be aware in regards to the other tests that were ordered. Please advise, thank you!      -AMY Yañez

## 2023-04-04 NOTE — TELEPHONE ENCOUNTER
I spoke with patient in regards to there MRI results. Patient could not recall what we had spoken about or what the results were in the last hour that I spoke with her. I repeated the information and I also repeated the information kenya provided in the encounter below for patient to be at ease in regards to any reactions she believes could experience during the series of tests. She verbalized understanding and states that she has another appointment coming up with another physician that would like to check what is going on with her circulatory issues with her legs. I informed her that we would be on the lookout for any information regarding her appointments with any physicians but in the meantime  had ordered tests to further evaluate the results she received from her MRI of her brain. She relayed understanding with all the information below and had no further requests. Thank you.       -AMY Yañez

## 2023-04-04 NOTE — TELEPHONE ENCOUNTER
This is noted in her chart.  The contrast dye for MRI is different than CT contrast, we can ask radiology to review this and consider options for safely evaluating her problem.

## 2023-04-05 NOTE — PROGRESS NOTES
I called the patient and relayed the results above per the physician and patient will wait to hear a call back from scheduling to get further tests done. There is a separate telephone encounter with further detail. Thanks! No further action needed at this time.     -AMY Yañez

## 2023-04-13 ENCOUNTER — OFFICE VISIT (OUTPATIENT)
Dept: SPORTS MEDICINE | Facility: CLINIC | Age: 83
End: 2023-04-13
Payer: MEDICARE

## 2023-04-13 VITALS
HEIGHT: 64 IN | HEART RATE: 74 BPM | DIASTOLIC BLOOD PRESSURE: 50 MMHG | BODY MASS INDEX: 19.97 KG/M2 | WEIGHT: 117 LBS | OXYGEN SATURATION: 97 % | SYSTOLIC BLOOD PRESSURE: 110 MMHG | RESPIRATION RATE: 16 BRPM

## 2023-04-13 DIAGNOSIS — R26.89 BALANCE PROBLEM: ICD-10-CM

## 2023-04-13 DIAGNOSIS — I77.9 VERTEBRAL ARTERY DISEASE: Primary | ICD-10-CM

## 2023-04-13 PROCEDURE — 3074F SYST BP LT 130 MM HG: CPT | Performed by: FAMILY MEDICINE

## 2023-04-13 PROCEDURE — 99213 OFFICE O/P EST LOW 20 MIN: CPT | Performed by: FAMILY MEDICINE

## 2023-04-13 PROCEDURE — 3078F DIAST BP <80 MM HG: CPT | Performed by: FAMILY MEDICINE

## 2023-04-13 NOTE — PROGRESS NOTES
"Jael is a 82 y.o. year old female    Chief Complaint   Patient presents with   • Balance Issues     F/u for balance issues - MRI of the brain done on 04/01/2023        History of Present Illness   HPI   Here to follow-up on multiple complaints.  Regarding her balance, she feels like she is improving with physical therapy.  She feels more stable overall.    She is concerned about having follow-up imaging of the abnormality on her brain MRI specifically does not want any form of contrast due to previous allergy to CT dye and concerns about long-term absorption of gadolinium.      Review of Systems    /50 (BP Location: Left arm, Patient Position: Sitting, Cuff Size: Adult)   Pulse 74   Resp 16   Ht 162.6 cm (64.02\")   Wt 53.1 kg (117 lb)   SpO2 97%   BMI 20.07 kg/m²          Physical Exam  Vitals reviewed.   Pulmonary:      Effort: Pulmonary effort is normal.   Neurological:      General: No focal deficit present.      Mental Status: She is alert and oriented to person, place, and time. Mental status is at baseline.      Comments: She is able to easily stand up from chair, but is slow and very cautious when starting to walk down the hallway   Psychiatric:         Mood and Affect: Mood normal.      Comments: She seems overall brighter today           Current Outpatient Medications:   •  carboxymethylcellulose (REFRESH PLUS) 0.5 % solution, 1 drop 2 (Two) Times a Day As Needed for Dry Eyes., Disp: , Rfl:   •  cilostazol (PLETAL) 50 MG tablet, Take 1 tablet by mouth 2 (Two) Times a Day Before Meals., Disp: 180 tablet, Rfl: 3  •  levothyroxine (SYNTHROID, LEVOTHROID) 50 MCG tablet, Take 1 tablet by mouth Daily., Disp: 90 tablet, Rfl: 1  •  Multiple Vitamin (MULTI-VITAMIN) tablet, Take by mouth., Disp: , Rfl:      Diagnoses and all orders for this visit:    Vertebral artery disease  -     MRI angiogram head wo contrast; Future  -     MRI angiogram neck wo contrast; Future    Balance problem       Agree with " continued therapy for her balance disorder  Discussed follow-up MRI.  I actually provided clarification today and corrected her order that the MRI angiogram of her head is a noncontrast study so we can obtain this imaging without concern.      EMR Dragon/Transcription disclaimer:    Much of this encounter note is an electronic transcription/translation of spoken language to printed text.  The electronic translation of spoken language may permit erroneous, or at times, nonsensical words or phrases to be inadvertently transcribed.  Although I have reviewed the note for such errors some may still exist.

## 2023-04-20 ENCOUNTER — HOSPITAL ENCOUNTER (OUTPATIENT)
Dept: MRI IMAGING | Facility: HOSPITAL | Age: 83
Discharge: HOME | End: 2023-04-20
Payer: MEDICARE

## 2023-04-20 DIAGNOSIS — I77.9 VERTEBRAL ARTERY DISEASE: ICD-10-CM

## 2023-04-20 PROCEDURE — 70547 MR ANGIOGRAPHY NECK W/O DYE: CPT

## 2023-04-20 PROCEDURE — 70544 MR ANGIOGRAPHY HEAD W/O DYE: CPT

## 2023-04-27 DIAGNOSIS — I77.9 VERTEBRAL ARTERY DISEASE: Primary | ICD-10-CM

## 2023-05-17 ENCOUNTER — TELEPHONE (OUTPATIENT)
Dept: SPORTS MEDICINE | Facility: CLINIC | Age: 83
End: 2023-05-17
Payer: MEDICARE

## 2023-05-17 DIAGNOSIS — I77.9 VERTEBRAL ARTERY DISEASE: Primary | ICD-10-CM

## 2023-05-17 NOTE — TELEPHONE ENCOUNTER
Patient called and states that she has not heard back from her vascular surgeon in regards to her MRI of her head. She states that when she called to speak with her vascular surgeon, his medical assistant informed her that this was out of his area of expertise and that there was nothing they could do for her. She stated her frustration and wanted to know what she needs to do because no one has reached out to her to follow up on her MRI. Please advise, should we schedule her for a follow up in our office? Thanks!    -AMY Yañez

## 2023-05-19 NOTE — TELEPHONE ENCOUNTER
Called patient and relayed this information. I also went into the referral and worked this. I provided her with the scheduling number to call and schedule.No further action needed at this time.

## 2023-05-24 NOTE — PROGRESS NOTES
Subjective   Patient ID: Jael Navarro is a 82 y.o. female is being seen for consultation today at the request of Keaton Dao MD for vertebral artery disease. MRA head/neck scan was done on 4/23/23.     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  She has a history of hyperlipidemia, but is not on a statin.  She does take Pletal as an antiplatelet.  She has a history of peripheral vascular disease and is being seen by Dr. Darnell.  Her falls have occurred both at home as well as in public and she reports that usually there are no precipitating symptoms, but on occasion has had some dizziness and ataxia.  She is a non-smoker and reports that her blood pressure is usually a systolic of .  Of note is her allergy to IV contrast which makes further imaging difficult.  She presents today to discuss her vertebral artery occlusion.      The following portions of the patient's history were reviewed and updated as appropriate:   She  has a past medical history of Allergic rhinitis, Degeneration, intervertebral disc, thoracic, Disease of thyroid gland, Fracture of ankle, Gingival and periodontal disease, Hypertension, Irritable bowel syndrome, Knee fracture, and Wrist fracture.  She does not have any pertinent problems on file.  She  has a past surgical history that includes Abdominoplasty; Cataract Extraction; Dilation and curettage of uterus; Excision benign skin lesion scalp / neck / hands / feet / genitalia; Hand surgery; Hysterectomy; Colonoscopy (unknown); Appendectomy; and Colonoscopy (N/A, 3/29/2017).  Her family history includes Alzheimer's disease in her mother and sister; Anxiety disorder in her daughter; Bipolar disorder in her daughter; Breast cancer in an other family member; Colon polyps in her daughter; Depression in her daughter; Diabetes in her  cousin and another family member; Emphysema in her cousin; Glaucoma in her mother; Heart attack in her maternal grandmother and mother; Heart disease in her cousin, father, mother, and other family members; Hypertension in her mother; Lung cancer in an other family member; Multiple sclerosis in her cousin; Nephrolithiasis in an other family member; Ovarian cancer in an other family member; Stroke in her daughter; Thyroid disease in her mother.  She  reports that she has never smoked. She has never used smokeless tobacco. She reports that she does not drink alcohol and does not use drugs.  Current Outpatient Medications   Medication Sig Dispense Refill   • carboxymethylcellulose (REFRESH PLUS) 0.5 % solution 1 drop 2 (Two) Times a Day As Needed for Dry Eyes.     • cilostazol (PLETAL) 50 MG tablet Take 1 tablet by mouth 2 (Two) Times a Day Before Meals. 180 tablet 3   • levothyroxine (SYNTHROID, LEVOTHROID) 50 MCG tablet Take 1 tablet by mouth Daily. 90 tablet 1   • Multiple Vitamin (MULTI-VITAMIN) tablet Take by mouth.       No current facility-administered medications for this visit.     Current Outpatient Medications on File Prior to Visit   Medication Sig   • carboxymethylcellulose (REFRESH PLUS) 0.5 % solution 1 drop 2 (Two) Times a Day As Needed for Dry Eyes.   • cilostazol (PLETAL) 50 MG tablet Take 1 tablet by mouth 2 (Two) Times a Day Before Meals.   • levothyroxine (SYNTHROID, LEVOTHROID) 50 MCG tablet Take 1 tablet by mouth Daily.   • Multiple Vitamin (MULTI-VITAMIN) tablet Take by mouth.     No current facility-administered medications on file prior to visit.     She is allergic to iodinated contrast media, other, iodine, and sulfa antibiotics..    Review of Systems   HENT: Negative for hearing loss and tinnitus.    Eyes: Negative for visual disturbance.   Gastrointestinal: Negative for nausea.   Musculoskeletal: Positive for gait problem (Balance ). Negative for neck pain and neck stiffness.    Neurological: Positive for dizziness. Negative for light-headedness and headaches.       Objective     Vitals:    05/25/23 1305   BP: 130/78   Pulse: 80   Temp: 98 °F (36.7 °C)   SpO2: 97%   Weight: 54.9 kg (121 lb)     Body mass index is 20.76 kg/m².      Physical Exam  Vitals and nursing note reviewed.   Constitutional:       General: She is not in acute distress.     Appearance: She is normal weight.   HENT:      Head: Normocephalic.      Nose: Nose normal.      Mouth/Throat:      Mouth: Mucous membranes are moist.   Eyes:      Extraocular Movements: Extraocular movements intact.      Conjunctiva/sclera: Conjunctivae normal.      Pupils: Pupils are equal, round, and reactive to light.   Cardiovascular:      Rate and Rhythm: Normal rate.   Pulmonary:      Effort: Pulmonary effort is normal.   Musculoskeletal:         General: Normal range of motion.      Cervical back: Normal range of motion.   Skin:     General: Skin is warm and dry.   Neurological:      General: No focal deficit present.      Mental Status: She is alert and oriented to person, place, and time.      Cranial Nerves: No cranial nerve deficit.      Sensory: No sensory deficit.      Motor: No weakness.      Coordination: Coordination normal.   Psychiatric:         Mood and Affect: Mood normal.         Behavior: Behavior normal.         Thought Content: Thought content normal.         Judgment: Judgment normal.       Neurologic Exam     Mental Status   Oriented to person, place, and time.     Cranial Nerves     CN III, IV, VI   Pupils are equal, round, and reactive to light.          Assessment & Plan   Independent Review of Radiographic Studies:      I personally reviewed the images from the following studies.    The MRI of the brain dated 4/1/2023 shows moderate chronic small vessel ischemic change, but no evidence to suggest a cortical infarct.  The MR angiogram of the head neck vasculature dated 4/20/2023 shows occlusion of the left vertebral  artery at the skull base with probably some intermittent collaterals in the cervical segment.  The right vertebral artery is prominent with no evidence of any stenosis or narrowing.  The basilar artery does show a moderate mid basilar trunk narrowing, but flow is present.    Medical Decision Makin-year-old female with some cerebrovascular risk factors who developed a left vertebral artery occlusion which may contribute to her symptoms and continued falls, however her right vertebral artery is robust and likely provides collateral flow to her left PICA.  The mid basilar stenosis is moderate in its degree of narrowing, but any endovascular intervention would be of high risk in this patient.  The patient for one is allergic to have IV contrast and declines any studies using it.  She currently also declines maximal medical therapy and refuses any statin medications.  She was advised to be careful with her movements in order to refrain from losing balance.  She should continue her physical therapy and I encouraged her use of her newly acquired walker.  She can be seen again on a as needed basis.  Diagnoses and all orders for this visit:    1. Falling episodes (Primary)    2. 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 Basilar artery stenosis    4. Peripheral arterial disease    5. Mixed hyperlipidemia    6. Essential hypertension      Return if symptoms worsen or fail to improve.  Encourage maximal medical therapy.  Continue physical therapy.

## 2023-05-25 ENCOUNTER — OFFICE VISIT (OUTPATIENT)
Dept: NEUROSURGERY | Facility: CLINIC | Age: 83
End: 2023-05-25
Payer: MEDICARE

## 2023-05-25 VITALS
BODY MASS INDEX: 20.76 KG/M2 | SYSTOLIC BLOOD PRESSURE: 130 MMHG | WEIGHT: 121 LBS | HEART RATE: 80 BPM | OXYGEN SATURATION: 97 % | DIASTOLIC BLOOD PRESSURE: 78 MMHG | TEMPERATURE: 98 F

## 2023-05-25 DIAGNOSIS — I73.9 PERIPHERAL ARTERIAL DISEASE: ICD-10-CM

## 2023-05-25 DIAGNOSIS — E78.2 MIXED HYPERLIPIDEMIA: ICD-10-CM

## 2023-05-25 DIAGNOSIS — R29.6 FALLING EPISODES: Primary | ICD-10-CM

## 2023-05-25 DIAGNOSIS — I10 ESSENTIAL HYPERTENSION: ICD-10-CM

## 2023-05-25 DIAGNOSIS — I66.9 CEREBRAL ARTERY OCCLUSION: ICD-10-CM

## 2023-05-25 DIAGNOSIS — I65.1 BASILAR ARTERY STENOSIS: ICD-10-CM

## 2023-05-25 PROCEDURE — 1160F RVW MEDS BY RX/DR IN RCRD: CPT | Performed by: RADIOLOGY

## 2023-05-25 PROCEDURE — 99204 OFFICE O/P NEW MOD 45 MIN: CPT | Performed by: RADIOLOGY

## 2023-05-25 PROCEDURE — 3075F SYST BP GE 130 - 139MM HG: CPT | Performed by: RADIOLOGY

## 2023-05-25 PROCEDURE — 1159F MED LIST DOCD IN RCRD: CPT | Performed by: RADIOLOGY

## 2023-05-25 PROCEDURE — 3078F DIAST BP <80 MM HG: CPT | Performed by: RADIOLOGY

## 2023-05-26 ENCOUNTER — TELEPHONE (OUTPATIENT)
Dept: NEUROSURGERY | Facility: CLINIC | Age: 83
End: 2023-05-26

## 2023-05-26 NOTE — TELEPHONE ENCOUNTER
Patient called with concerns about increased pulsation above right ear. She also has tenderness in that area when she has these sensations. Per Dr. Schwab's note he would like her to have a diagnostic angiogram however she is allergic contrast dye and she does not want to do it at this time. I let he know I will discuss with Dr. Schwab and will call her back. She is aware if symptoms become worse or concerning she should go to the ER.

## 2023-05-31 NOTE — TELEPHONE ENCOUNTER
I called patient unable to leave  due to voice mail not being set up. I called patient to check on her symptoms. Per Dr. Schwab the next step would be a CTA or diagnostic angiogram. She would need to be pretreated with the allergy protocol.

## 2023-06-07 NOTE — ED NOTES
Pt verbalized understanding to f/u with her pcp and to return to the ER if symptoms are worse.      Nuris Rodriguez RN  02/21/21 [id number]     4 = No assist / stand by assistance

## 2023-08-14 ENCOUNTER — OFFICE VISIT (OUTPATIENT)
Dept: SPORTS MEDICINE | Facility: CLINIC | Age: 83
End: 2023-08-14
Payer: MEDICARE

## 2023-08-14 ENCOUNTER — LAB (OUTPATIENT)
Dept: LAB | Facility: HOSPITAL | Age: 83
End: 2023-08-14
Payer: MEDICARE

## 2023-08-14 VITALS
DIASTOLIC BLOOD PRESSURE: 52 MMHG | TEMPERATURE: 98.2 F | HEART RATE: 75 BPM | HEIGHT: 64 IN | SYSTOLIC BLOOD PRESSURE: 128 MMHG | BODY MASS INDEX: 19.46 KG/M2 | OXYGEN SATURATION: 98 % | WEIGHT: 114 LBS

## 2023-08-14 DIAGNOSIS — E55.9 VITAMIN D DEFICIENCY: ICD-10-CM

## 2023-08-14 DIAGNOSIS — E03.9 ACQUIRED HYPOTHYROIDISM: ICD-10-CM

## 2023-08-14 DIAGNOSIS — E56.9 VITAMIN DEFICIENCY: ICD-10-CM

## 2023-08-14 DIAGNOSIS — I73.9 PERIPHERAL ARTERIAL DISEASE: ICD-10-CM

## 2023-08-14 DIAGNOSIS — R79.9 ABNORMAL FINDING OF BLOOD CHEMISTRY, UNSPECIFIED: ICD-10-CM

## 2023-08-14 DIAGNOSIS — R61 NIGHT SWEATS: ICD-10-CM

## 2023-08-14 DIAGNOSIS — Z00.00 MEDICARE ANNUAL WELLNESS VISIT, SUBSEQUENT: Primary | ICD-10-CM

## 2023-08-14 DIAGNOSIS — R35.0 URINE FREQUENCY: ICD-10-CM

## 2023-08-14 DIAGNOSIS — R25.2 MUSCLE CRAMPS: ICD-10-CM

## 2023-08-14 DIAGNOSIS — R32 URINARY INCONTINENCE, UNSPECIFIED TYPE: ICD-10-CM

## 2023-08-14 LAB
25(OH)D3 SERPL-MCNC: 31 NG/ML (ref 30–100)
ALBUMIN SERPL-MCNC: 4.5 G/DL (ref 3.5–5.2)
ALBUMIN/GLOB SERPL: 1.6 G/DL
ALP SERPL-CCNC: 87 U/L (ref 39–117)
ALT SERPL W P-5'-P-CCNC: 22 U/L (ref 1–33)
ANION GAP SERPL CALCULATED.3IONS-SCNC: 8.7 MMOL/L (ref 5–15)
AST SERPL-CCNC: 22 U/L (ref 1–32)
BACTERIA UR QL AUTO: NORMAL /HPF
BASOPHILS # BLD AUTO: 0.02 10*3/MM3 (ref 0–0.2)
BASOPHILS NFR BLD AUTO: 0.5 % (ref 0–1.5)
BILIRUB SERPL-MCNC: 0.6 MG/DL (ref 0–1.2)
BILIRUB UR QL STRIP: NEGATIVE
BUN SERPL-MCNC: 14 MG/DL (ref 8–23)
BUN/CREAT SERPL: 19.7 (ref 7–25)
CALCIUM SPEC-SCNC: 9.6 MG/DL (ref 8.6–10.5)
CHLORIDE SERPL-SCNC: 103 MMOL/L (ref 98–107)
CHLORIDE UR-SCNC: 80 MMOL/L
CHOLEST SERPL-MCNC: 230 MG/DL (ref 0–200)
CLARITY UR: CLEAR
CO2 SERPL-SCNC: 27.3 MMOL/L (ref 22–29)
COLOR UR: YELLOW
CREAT SERPL-MCNC: 0.71 MG/DL (ref 0.57–1)
DEPRECATED RDW RBC AUTO: 41.6 FL (ref 37–54)
EGFRCR SERPLBLD CKD-EPI 2021: 83.4 ML/MIN/1.73
EOSINOPHIL # BLD AUTO: 0.04 10*3/MM3 (ref 0–0.4)
EOSINOPHIL NFR BLD AUTO: 0.9 % (ref 0.3–6.2)
ERYTHROCYTE [DISTWIDTH] IN BLOOD BY AUTOMATED COUNT: 11.9 % (ref 12.3–15.4)
FERRITIN SERPL-MCNC: 151.4 NG/ML (ref 13–150)
GLOBULIN UR ELPH-MCNC: 2.8 GM/DL
GLUCOSE SERPL-MCNC: 101 MG/DL (ref 65–99)
GLUCOSE UR STRIP-MCNC: NEGATIVE MG/DL
HBA1C MFR BLD: 5.8 % (ref 4.8–5.6)
HCT VFR BLD AUTO: 37.1 % (ref 34–46.6)
HDLC SERPL QL: 4.11
HDLC SERPL-MCNC: 56 MG/DL (ref 40–60)
HGB BLD-MCNC: 12.3 G/DL (ref 12–15.9)
HGB UR QL STRIP.AUTO: ABNORMAL
HYALINE CASTS UR QL AUTO: NORMAL /LPF
IMM GRANULOCYTES # BLD AUTO: 0.01 10*3/MM3 (ref 0–0.05)
IMM GRANULOCYTES NFR BLD AUTO: 0.2 % (ref 0–0.5)
IRON 24H UR-MRATE: 95 MCG/DL (ref 37–145)
IRON SATN MFR SERPL: 25 % (ref 20–50)
KETONES UR QL STRIP: NEGATIVE
LDLC SERPL CALC-MCNC: 153 MG/DL (ref 0–100)
LEUKOCYTE ESTERASE UR QL STRIP.AUTO: NEGATIVE
LYMPHOCYTES # BLD AUTO: 1.32 10*3/MM3 (ref 0.7–3.1)
LYMPHOCYTES NFR BLD AUTO: 31 % (ref 19.6–45.3)
MAGNESIUM SERPL-MCNC: 2.1 MG/DL (ref 1.6–2.4)
MCH RBC QN AUTO: 31.3 PG (ref 26.6–33)
MCHC RBC AUTO-ENTMCNC: 33.2 G/DL (ref 31.5–35.7)
MCV RBC AUTO: 94.4 FL (ref 79–97)
MONOCYTES # BLD AUTO: 0.31 10*3/MM3 (ref 0.1–0.9)
MONOCYTES NFR BLD AUTO: 7.3 % (ref 5–12)
NEUTROPHILS NFR BLD AUTO: 2.56 10*3/MM3 (ref 1.7–7)
NEUTROPHILS NFR BLD AUTO: 60.1 % (ref 42.7–76)
NITRITE UR QL STRIP: NEGATIVE
NRBC BLD AUTO-RTO: 0 /100 WBC (ref 0–0.2)
PH UR STRIP.AUTO: 5.5 [PH] (ref 5–8)
PLATELET # BLD AUTO: 246 10*3/MM3 (ref 140–450)
PMV BLD AUTO: 9.2 FL (ref 6–12)
POTASSIUM SERPL-SCNC: 4.3 MMOL/L (ref 3.5–5.2)
POTASSIUM UR-SCNC: 19.3 MMOL/L
PROT SERPL-MCNC: 7.3 G/DL (ref 6–8.5)
PROT UR QL STRIP: NEGATIVE
RBC # BLD AUTO: 3.93 10*6/MM3 (ref 3.77–5.28)
RBC # UR STRIP: NORMAL /HPF
REF LAB TEST METHOD: NORMAL
SODIUM SERPL-SCNC: 139 MMOL/L (ref 136–145)
SODIUM UR-SCNC: 76 MMOL/L
SP GR UR STRIP: 1.01 (ref 1–1.03)
SQUAMOUS #/AREA URNS HPF: NORMAL /HPF
TIBC SERPL-MCNC: 378 MCG/DL (ref 298–536)
TRANSFERRIN SERPL-MCNC: 254 MG/DL (ref 200–360)
TRIGL SERPL-MCNC: 116 MG/DL (ref 0–150)
UROBILINOGEN UR QL STRIP: ABNORMAL
VIT B12 BLD-MCNC: 383 PG/ML (ref 211–946)
VLDLC SERPL-MCNC: 21 MG/DL (ref 5–40)
WBC # UR STRIP: NORMAL /HPF
WBC NRBC COR # BLD: 4.26 10*3/MM3 (ref 3.4–10.8)

## 2023-08-14 PROCEDURE — 84443 ASSAY THYROID STIM HORMONE: CPT | Performed by: FAMILY MEDICINE

## 2023-08-14 PROCEDURE — 87086 URINE CULTURE/COLONY COUNT: CPT | Performed by: FAMILY MEDICINE

## 2023-08-14 PROCEDURE — 84300 ASSAY OF URINE SODIUM: CPT | Performed by: FAMILY MEDICINE

## 2023-08-14 PROCEDURE — 84466 ASSAY OF TRANSFERRIN: CPT | Performed by: FAMILY MEDICINE

## 2023-08-14 PROCEDURE — 85025 COMPLETE CBC W/AUTO DIFF WBC: CPT | Performed by: FAMILY MEDICINE

## 2023-08-14 PROCEDURE — 82306 VITAMIN D 25 HYDROXY: CPT | Performed by: FAMILY MEDICINE

## 2023-08-14 PROCEDURE — 36415 COLL VENOUS BLD VENIPUNCTURE: CPT | Performed by: FAMILY MEDICINE

## 2023-08-14 PROCEDURE — 83735 ASSAY OF MAGNESIUM: CPT | Performed by: FAMILY MEDICINE

## 2023-08-14 PROCEDURE — 84133 ASSAY OF URINE POTASSIUM: CPT | Performed by: FAMILY MEDICINE

## 2023-08-14 PROCEDURE — 81001 URINALYSIS AUTO W/SCOPE: CPT | Performed by: FAMILY MEDICINE

## 2023-08-14 PROCEDURE — 80061 LIPID PANEL: CPT | Performed by: FAMILY MEDICINE

## 2023-08-14 PROCEDURE — 82607 VITAMIN B-12: CPT | Performed by: FAMILY MEDICINE

## 2023-08-14 PROCEDURE — 82728 ASSAY OF FERRITIN: CPT | Performed by: FAMILY MEDICINE

## 2023-08-14 PROCEDURE — 82436 ASSAY OF URINE CHLORIDE: CPT | Performed by: FAMILY MEDICINE

## 2023-08-14 PROCEDURE — 83540 ASSAY OF IRON: CPT | Performed by: FAMILY MEDICINE

## 2023-08-14 PROCEDURE — 83036 HEMOGLOBIN GLYCOSYLATED A1C: CPT | Performed by: FAMILY MEDICINE

## 2023-08-14 PROCEDURE — 80053 COMPREHEN METABOLIC PANEL: CPT | Performed by: FAMILY MEDICINE
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
## 2023-08-15 LAB
BACTERIA SPEC AEROBE CULT: NORMAL
TSH SERPL DL<=0.005 MIU/L-ACNC: 2.11 UIU/ML (ref 0.45–4.5)

## 2023-08-21 NOTE — PROGRESS NOTES
I called the patient and relayed the results above per the physician. Patient verbalized all understanding of the results above and didn't have any further request or questions at this time. No further action needed.    SARAH García

## 2023-09-19 ENCOUNTER — TELEPHONE (OUTPATIENT)
Dept: SPORTS MEDICINE | Facility: CLINIC | Age: 83
End: 2023-09-19

## 2023-09-19 DIAGNOSIS — M81.0 AGE-RELATED OSTEOPOROSIS WITHOUT CURRENT PATHOLOGICAL FRACTURE: ICD-10-CM

## 2023-09-19 DIAGNOSIS — M85.80 OSTEOPENIA, UNSPECIFIED LOCATION: ICD-10-CM

## 2023-09-19 DIAGNOSIS — Z12.31 BREAST CANCER SCREENING BY MAMMOGRAM: Primary | ICD-10-CM

## 2023-09-19 NOTE — TELEPHONE ENCOUNTER
Patient came to office. Concerned about [employer] contract issues and possibility of having to find new primary care.     Wants to know if Dr Dao can put in order for her mammogram (due in early Oct) [facility] Also knows does bone density test but doesn't recall most recent. If is due for another, would like that order put in as well.     Please advise.

## 2023-12-19 DIAGNOSIS — M81.0 AGE-RELATED OSTEOPOROSIS WITHOUT CURRENT PATHOLOGICAL FRACTURE: ICD-10-CM

## 2023-12-19 DIAGNOSIS — M85.80 OSTEOPENIA, UNSPECIFIED LOCATION: ICD-10-CM

## 2024-01-02 LAB
BH CV VAS BP LEFT ARM: NORMAL MMHG
BH CV VAS BP RIGHT ARM: NORMAL MMHG

## 2024-03-06 DIAGNOSIS — I65.23 BILATERAL CAROTID ARTERY OCCLUSION: ICD-10-CM

## 2024-03-06 DIAGNOSIS — I70.213 ATHEROSCLEROSIS OF NATIVE ARTERY OF BOTH LOWER EXTREMITIES WITH INTERMITTENT CLAUDICATION: Primary | ICD-10-CM

## 2024-03-29 NOTE — PROGRESS NOTES
Chief Complaint  Follow-up (DANDRE, CTD and follow up. )    Subjective        Jael Elana Navarro presents to [facility] VASCULAR SURGERY  History of Present Illness patient is an 83-year-old female that I have known for several decades, retired operating room nurse, with carotid artery occlusive disease and peripheral artery disease.  She has significant lower extremity occlusive disease but no critical limb ischemic symptoms.  ABIs on the right have been between 0.58 and 0.5, and on the left from 0.38 and 0.4.  She has had duplex imaging performed of the aorta and runoff vessels, because of fear of nephrotoxicity with contrast, and this demonstrated moderate distal aortic stenosis, iliac arteries without significant stenosis, and severe stenoses and occlusions in both superficial femoral arteries.  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  She was placed on Toprol for cardiac dysrhythmias the last part of 2023.  She returned on April 2, 2024 with repeat studies.  Carotid duplex scan demonstrates mild occlusive disease bilaterally.  Left vertebral artery is patent (the intracranial vertebral artery has the occlusion)  ABIs are 0.56 on the right and 0.38 on the left, unchanged.  Past History:  Medical History: has a past medical history of Allergic rhinitis, Degeneration, intervertebral disc, thoracic, Disease of thyroid gland, Fracture of ankle, Gingival and periodontal disease, Hypertension, Irritable bowel syndrome, Knee fracture, and Wrist fracture.   Surgical History: has a past surgical history that includes Abdominoplasty; Cataract Extraction; Dilation and curettage of uterus; Excision benign skin lesion scalp / neck / hands / feet / genitalia; Hand surgery; Hysterectomy; Colonoscopy (unknown); Appendectomy; and Colonoscopy (N/A, 3/29/2017).   Family History: family history includes  "Alzheimer's disease in her mother and sister; Anxiety disorder in her daughter; Bipolar disorder in her daughter; Breast cancer in an other family member; Colon polyps in her daughter; Depression in her daughter; Diabetes in her cousin and another family member; Emphysema in her cousin; Glaucoma in her mother; Heart attack in her maternal grandmother and mother; Heart disease in her cousin, father, mother, and other family members; Hypertension in her mother; Lung cancer in an other family member; Multiple sclerosis in her cousin; Nephrolithiasis in an other family member; Ovarian cancer in an other family member; Stroke in her daughter; Thyroid disease in her mother.   Social History: reports that she has never smoked. She has never used smokeless tobacco. She reports that she does not drink alcohol and does not use drugs.    (Not in a hospital admission)     Allergies: Augmentin [amoxicillin-pot clavulanate], Iodinated contrast media, Other, Contrast dye (echo or unknown ct/mr), Iodine, Amoxicillin, and Sulfa antibiotics   Objective   Vital Signs:  /74   Ht 162.6 cm (64.02\")   Wt 51.7 kg (114 lb)   BMI 19.56 kg/m²   Estimated body mass index is 19.56 kg/m² as calculated from the following:    Height as of this encounter: 162.6 cm (64.02\").    Weight as of this encounter: 51.7 kg (114 lb).       BMI is within normal parameters. No other follow-up for BMI required.      Physical Exam  Constitutional:       Appearance: Normal appearance.   HENT:      Head: Normocephalic and atraumatic.   Eyes:      Extraocular Movements: Extraocular movements intact.      Pupils: Pupils are equal, round, and reactive to light.   Cardiovascular:      Rate and Rhythm: Normal rate and regular rhythm.   Pulmonary:      Effort: Pulmonary effort is normal.      Breath sounds: Normal breath sounds.   Abdominal:      General: Bowel sounds are normal.      Palpations: Abdomen is soft.   Musculoskeletal:         General: Normal range " of motion.      Cervical back: Normal range of motion and neck supple.   Lymphadenopathy:      Cervical: No cervical adenopathy.   Skin:     General: Skin is warm and dry.   Neurological:      General: No focal deficit present.      Mental Status: She is alert.   Psychiatric:         Mood and Affect: Mood normal.         Behavior: Behavior normal.     No palpable pulses below the femoral arteries.  Mild dependent rubor of both feet.  Trace to 1+ edema noted in both lower extremities, with a few scattered reticular veins and spider telangiectasia.  Result Review :    The following data was reviewed by: Sylvia Darnell Jr., MD on 04/02/2024:    Data reviewed : Carotid duplex scan and ABIs performed on April 2, 2024, as described above.             Assessment and Plan     Diagnoses and all orders for this visit:    1. Bilateral carotid artery stenosis (Primary)    2. Vertebral artery occlusion, left    3. Peripheral vascular disease of extremity with claudication    She appears to be holding her own currently with no worsening of her symptoms, no cerebrovascular symptoms but no critical limb ischemic symptoms.  No changes in her medication regimen.  She has had issues with falling and she is now in an assisted living home.  She will not need a carotid duplex scan for a year but we will see her in 6 months with repeat ABIs.       Follow Up     Return in about 6 months (around 10/2/2024).  Patient was given instructions and counseling regarding her condition or for health maintenance advice. Please see specific information pulled into the AVS if appropriate.

## 2024-04-02 ENCOUNTER — OFFICE VISIT (OUTPATIENT)
Age: 84
End: 2024-04-02
Payer: MEDICARE

## 2024-04-02 ENCOUNTER — HOSPITAL ENCOUNTER (OUTPATIENT)
Facility: HOSPITAL | Age: 84
Discharge: HOME | End: 2024-04-02
Payer: MEDICARE

## 2024-04-02 VITALS
BODY MASS INDEX: 19.46 KG/M2 | DIASTOLIC BLOOD PRESSURE: 74 MMHG | HEIGHT: 64 IN | WEIGHT: 114 LBS | SYSTOLIC BLOOD PRESSURE: 112 MMHG

## 2024-04-02 DIAGNOSIS — I65.23 BILATERAL CAROTID ARTERY OCCLUSION: ICD-10-CM

## 2024-04-02 DIAGNOSIS — I73.9 PERIPHERAL VASCULAR DISEASE OF EXTREMITY WITH CLAUDICATION: Primary | ICD-10-CM

## 2024-04-02 DIAGNOSIS — I65.02 VERTEBRAL ARTERY OCCLUSION, LEFT: ICD-10-CM

## 2024-04-02 DIAGNOSIS — I70.213 ATHEROSCLEROSIS OF NATIVE ARTERY OF BOTH LOWER EXTREMITIES WITH INTERMITTENT CLAUDICATION: ICD-10-CM

## 2024-04-02 DIAGNOSIS — I65.23 BILATERAL CAROTID ARTERY STENOSIS: ICD-10-CM

## 2024-04-02 LAB
BH CV LOWER ARTERIAL LEFT ABI RATIO: 0.38
BH CV LOWER ARTERIAL LEFT DORSALIS PEDIS SYS MAX: 54
BH CV LOWER ARTERIAL LEFT POST TIBIAL SYS MAX: 52
BH CV LOWER ARTERIAL RIGHT ABI RATIO: 0.56
BH CV LOWER ARTERIAL RIGHT DORSALIS PEDIS SYS MAX: 78
BH CV LOWER ARTERIAL RIGHT POST TIBIAL SYS MAX: 80
BH CV XLRA MEAS LEFT CAROTID BULB EDV: 18.7 CM/SEC
BH CV XLRA MEAS LEFT CAROTID BULB PSV: 119 CM/SEC
BH CV XLRA MEAS LEFT DIST CCA EDV: 19.6 CM/SEC
BH CV XLRA MEAS LEFT DIST CCA PSV: 125.7 CM/SEC
BH CV XLRA MEAS LEFT DIST ICA EDV: -22.8 CM/SEC
BH CV XLRA MEAS LEFT DIST ICA PSV: -98.2 CM/SEC
BH CV XLRA MEAS LEFT ICA/CCA RATIO: 1.07
BH CV XLRA MEAS LEFT MID CCA EDV: 26.5 CM/SEC
BH CV XLRA MEAS LEFT MID CCA PSV: 189.6 CM/SEC
BH CV XLRA MEAS LEFT MID ICA EDV: -25.1 CM/SEC
BH CV XLRA MEAS LEFT MID ICA PSV: -134.4 CM/SEC
BH CV XLRA MEAS LEFT PROX CCA EDV: 18.7 CM/SEC
BH CV XLRA MEAS LEFT PROX CCA PSV: 116.9 CM/SEC
BH CV XLRA MEAS LEFT PROX ECA PSV: -145.4 CM/SEC
BH CV XLRA MEAS LEFT PROX ICA EDV: -33.4 CM/SEC
BH CV XLRA MEAS LEFT PROX ICA PSV: -133.6 CM/SEC
BH CV XLRA MEAS LEFT PROX SCLA PSV: 246.7 CM/SEC
BH CV XLRA MEAS LEFT VERTEBRAL A PSV: 75.4 CM/SEC
BH CV XLRA MEAS RIGHT CAROTID BULB EDV: 14.9 CM/SEC
BH CV XLRA MEAS RIGHT CAROTID BULB PSV: 77.8 CM/SEC
BH CV XLRA MEAS RIGHT DIST CCA EDV: 15.7 CM/SEC
BH CV XLRA MEAS RIGHT DIST CCA PSV: 95.1 CM/SEC
BH CV XLRA MEAS RIGHT DIST ICA EDV: -19.4 CM/SEC
BH CV XLRA MEAS RIGHT DIST ICA PSV: -97.6 CM/SEC
BH CV XLRA MEAS RIGHT ICA/CCA RATIO: 1.21
BH CV XLRA MEAS RIGHT MID CCA EDV: 19.6 CM/SEC
BH CV XLRA MEAS RIGHT MID CCA PSV: 117.9 CM/SEC
BH CV XLRA MEAS RIGHT MID ICA EDV: -21.7 CM/SEC
BH CV XLRA MEAS RIGHT MID ICA PSV: -79.8 CM/SEC
BH CV XLRA MEAS RIGHT PROX CCA EDV: 20.6 CM/SEC
BH CV XLRA MEAS RIGHT PROX CCA PSV: 136.5 CM/SEC
BH CV XLRA MEAS RIGHT PROX ECA PSV: -96.6 CM/SEC
BH CV XLRA MEAS RIGHT PROX ICA EDV: -25.9 CM/SEC
BH CV XLRA MEAS RIGHT PROX ICA PSV: -114.7 CM/SEC
BH CV XLRA MEAS RIGHT PROX SCLA PSV: 188.6 CM/SEC
BH CV XLRA MEAS RIGHT VERTEBRAL A EDV: 13.7 CM/SEC
BH CV XLRA MEAS RIGHT VERTEBRAL A PSV: 55 CM/SEC
LEFT ARM BP: NORMAL MMHG
RIGHT ARM BP: NORMAL MMHG
UPPER ARTERIAL LEFT ARM BRACHIAL SYS MAX: 142
UPPER ARTERIAL RIGHT ARM BRACHIAL SYS MAX: 138

## 2024-04-02 PROCEDURE — 93922 UPR/L XTREMITY ART 2 LEVELS: CPT

## 2024-04-02 PROCEDURE — 93880 EXTRACRANIAL BILAT STUDY: CPT

## 2024-04-02 RX ORDER — METRONIDAZOLE 500 MG/1
1 TABLET ORAL EVERY 12 HOURS SCHEDULED
COMMUNITY
Start: 2023-12-07

## 2024-05-20 ENCOUNTER — TELEPHONE (OUTPATIENT)
Age: 84
End: 2024-05-20
Payer: MEDICARE

## 2024-05-20 NOTE — TELEPHONE ENCOUNTER
Appointment moved up to June 25th, left information with son who will notify patient of appt change.

## 2024-06-06 ENCOUNTER — TELEPHONE (OUTPATIENT)
Age: 84
End: 2024-06-06
Payer: MEDICARE

## 2024-06-06 NOTE — TELEPHONE ENCOUNTER
Patient called stating she had pain in both of her feet. We have already moved appoitment up once trying to get her in sooner. Patient's POA does not know she is having consistant pain in her feet we were told if we can get her in sooner to contant her son and update him with this information.

## 2024-06-07 ENCOUNTER — TELEPHONE (OUTPATIENT)
Age: 84
End: 2024-06-07
Payer: MEDICARE

## 2024-06-07 DIAGNOSIS — I73.9 PERIPHERAL ARTERIAL DISEASE: ICD-10-CM

## 2024-06-10 RX ORDER — CILOSTAZOL 50 MG/1
50 TABLET ORAL 2 TIMES DAILY
Qty: 180 TABLET | Refills: 3 | Status: SHIPPED | OUTPATIENT
Start: 2024-06-10

## 2024-06-20 PROBLEM — M79.89 LEG SWELLING: Status: ACTIVE | Noted: 2024-06-20

## 2024-06-20 PROBLEM — I49.9 CARDIAC DYSRHYTHMIA: Status: ACTIVE | Noted: 2024-06-20

## 2024-06-21 NOTE — PROGRESS NOTES
Chief Complaint  Follow-up (Follow up with arterial doppler. 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  She has significant lower extremity occlusive disease but no critical limb ischemic symptoms.  ABIs on the right have been between 0.58 and 0.5, and on the left from 0.38 and 0.4.  She has had duplex imaging performed of the aorta and runoff vessels, because of fear of nephrotoxicity with contrast, and this demonstrated moderate distal aortic stenosis, iliac arteries without significant stenosis, and severe stenoses and occlusions in both superficial femoral arteries.  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  She was placed on Toprol for cardiac dysrhythmias the last part of 2023.  Her problem with falling improved with this.  She returned on April 2, 2024 with repeat studies.  Carotid duplex scan demonstrates mild occlusive disease bilaterally.  Left vertebral artery is patent (the intracranial vertebral artery has the occlusion)  ABIs are 0.56 on the right and 0.38 on the left, unchanged.  She returned on June 25, 2024 with ABIs.  Improved bilaterally, 0.66 on the right and 0.47 on the left.    Past History:  Medical History: has a past medical history of Acute cystitis without hematuria, Allergic rhinitis, Allergic rhinitis due to animal (cat) (dog) hair and dander, Allergic rhinitis, unspecified, Anxiety disorder, unspecified, Atherosclerosis of native arteries of extremities with intermittent claudication, bilateral legs, Atherosclerosis of native arteries of extremities with intermittent claudication, bilateral legs (02/12/2019), Cystocele,  unspecified, Degeneration, intervertebral disc, thoracic, Disease of thyroid gland, Disorder of thyroid, unspecified, Essential (primary) hypertension, Fibromyalgia, Fracture of ankle, Gastro-esophageal reflux disease without esophagitis, Gingival and periodontal disease, Hyperlipidemia, unspecified, Hypertension, Hypothyroidism, unspecified, Impaired glucose tolerance (oral), Irritable bowel syndrome, Knee fracture, Left lower quadrant pain, Other cervical disc degeneration, unspecified cervical region, Rosacea, unspecified, Unspecified cataract, Unspecified fracture of left wrist and hand, initial encounter for closed fracture, Unspecified fracture of right patella, initial encounter for closed fracture, Unspecified macular degeneration, Unspecified urinary incontinence, Vitamin D deficiency, unspecified, and Wrist fracture.   Surgical History: has a past surgical history that includes Abdominoplasty; Cataract Extraction (Bilateral); Dilation and curettage of uterus; Excision benign skin lesion scalp / neck / hands / feet / genitalia; Hand surgery; Hysterectomy; Colonoscopy (unknown); Appendectomy; and Colonoscopy (N/A, 03/29/2017).   Family History: family history includes Alzheimer's disease in her mother and sister; Anxiety disorder in her daughter; Bipolar disorder in her daughter; Breast cancer in an other family member; Colon polyps in her daughter; Depression in her daughter; Diabetes in her cousin and another family member; Emphysema in her cousin; Glaucoma in her mother; Heart attack in her maternal grandmother and mother; Heart disease in her cousin, father, and other family members; Heart disease (age of onset: 96) in her mother; Hypertension in her mother; Lung cancer in an other family member; Multiple sclerosis in her cousin; Nephrolithiasis in an other family member; Ovarian cancer in an other family member; Stroke in her daughter; Stroke (age of onset: 40) in her father; Thyroid disease in her  "mother.   Social History: reports that she has never smoked. She has never used smokeless tobacco. She reports that she does not drink alcohol and does not use drugs.    (Not in a hospital admission)     Allergies: Augmentin [amoxicillin-pot clavulanate], Iodinated contrast media, Other, Contrast dye (echo or unknown ct/mr), Iodine, Amoxicillin, and Sulfa antibiotics   Objective   Vital Signs:  /72   Ht 162.6 cm (64.02\")   Wt 51.7 kg (114 lb)   BMI 19.56 kg/m²   Estimated body mass index is 19.56 kg/m² as calculated from the following:    Height as of this encounter: 162.6 cm (64.02\").    Weight as of this encounter: 51.7 kg (114 lb).     BMI is within normal parameters. No other follow-up for BMI required.    Jael Navarro  reports that she has never smoked. She has never used smokeless tobacco.          Physical Exam no palpable pulses noted below the femoral arteries.  Both feet are warm and satisfactorily perfused.  No ulcers or wounds present.  Result Review :        Data reviewed : ABIs from April 2, 2024 in June 25, 2024, findings as above             Assessment and Plan     Diagnoses and all orders for this visit:    1. Peripheral vascular disease of extremity with claudication (Primary)  -     Doppler Ankle Brachial Index Single Level CAR; Future    2. Bilateral carotid artery stenosis  -     Duplex Carotid Ultrasound CAR; Future    3. Vertebral artery occlusion, left  -     Duplex Carotid Ultrasound CAR; Future    Other orders  -     cilostazol (PLETAL) 50 MG tablet; Take 1 tablet by mouth 2 (Two) Times a Day.  Dispense: 180 tablet; Refill: 3    All things considered, she is doing reasonably well.  She continues to be free of rest pain and/or ulcerations.  Her dysrhythmias are much better controlled with metoprolol and as a result of being on this medication her falls have decreased.  We will continue Pletal 50 mg twice a day, and Toprol as prescribed.  I will see her in follow-up in 6 " months with a carotid duplex scan and ABIs.  I will see her sooner if new problems arise.         Follow Up     Return in about 6 months (around 12/25/2024) for With carotid scan and ABIs.  Patient was given instructions and counseling regarding her condition or for health maintenance advice. Please see specific information pulled into the AVS if appropriate.

## 2024-06-25 ENCOUNTER — OFFICE VISIT (OUTPATIENT)
Age: 84
End: 2024-06-25
Payer: MEDICARE

## 2024-06-25 ENCOUNTER — HOSPITAL ENCOUNTER (OUTPATIENT)
Facility: HOSPITAL | Age: 84
Discharge: HOME | End: 2024-06-25
Admitting: SURGERY
Payer: MEDICARE

## 2024-06-25 VITALS
DIASTOLIC BLOOD PRESSURE: 72 MMHG | SYSTOLIC BLOOD PRESSURE: 112 MMHG | BODY MASS INDEX: 19.46 KG/M2 | WEIGHT: 114 LBS | HEIGHT: 64 IN

## 2024-06-25 DIAGNOSIS — I65.23 BILATERAL CAROTID ARTERY STENOSIS: ICD-10-CM

## 2024-06-25 DIAGNOSIS — I73.9 PERIPHERAL VASCULAR DISEASE OF EXTREMITY WITH CLAUDICATION: Primary | ICD-10-CM

## 2024-06-25 DIAGNOSIS — I65.02 VERTEBRAL ARTERY OCCLUSION, LEFT: ICD-10-CM

## 2024-06-25 DIAGNOSIS — I73.9 PERIPHERAL VASCULAR DISEASE OF EXTREMITY WITH CLAUDICATION: ICD-10-CM

## 2024-06-25 LAB
BH CV LOWER ARTERIAL LEFT ABI RATIO: 0.47
BH CV LOWER ARTERIAL LEFT DORSALIS PEDIS SYS MAX: 68
BH CV LOWER ARTERIAL LEFT POST TIBIAL SYS MAX: 78
BH CV LOWER ARTERIAL RIGHT ABI RATIO: 0.66
BH CV LOWER ARTERIAL RIGHT DORSALIS PEDIS SYS MAX: 90
BH CV LOWER ARTERIAL RIGHT POST TIBIAL SYS MAX: 110
UPPER ARTERIAL LEFT ARM BRACHIAL SYS MAX: NORMAL
UPPER ARTERIAL RIGHT ARM BRACHIAL SYS MAX: NORMAL

## 2024-06-25 PROCEDURE — 93922 UPR/L XTREMITY ART 2 LEVELS: CPT | Performed by: SURGERY

## 2024-06-25 PROCEDURE — 1160F RVW MEDS BY RX/DR IN RCRD: CPT | Performed by: SURGERY

## 2024-06-25 PROCEDURE — 1159F MED LIST DOCD IN RCRD: CPT | Performed by: SURGERY

## 2024-06-25 PROCEDURE — 3078F DIAST BP <80 MM HG: CPT | Performed by: SURGERY

## 2024-06-25 PROCEDURE — 93922 UPR/L XTREMITY ART 2 LEVELS: CPT

## 2024-06-25 PROCEDURE — 3074F SYST BP LT 130 MM HG: CPT | Performed by: SURGERY

## 2024-06-25 PROCEDURE — 99214 OFFICE O/P EST MOD 30 MIN: CPT | Performed by: SURGERY

## 2024-06-25 RX ORDER — CILOSTAZOL 50 MG/1
50 TABLET ORAL 2 TIMES DAILY
Qty: 180 TABLET | Refills: 3 | Status: SHIPPED | OUTPATIENT
Start: 2024-06-25

## 2024-06-25 RX ORDER — FLUTICASONE PROPIONATE 50 MCG
1 SPRAY, SUSPENSION (ML) NASAL DAILY
COMMUNITY
Start: 2024-04-29 | End: 2025-04-29

## 2024-07-09 ENCOUNTER — TELEPHONE (OUTPATIENT)
Dept: GASTROENTEROLOGY | Facility: CLINIC | Age: 84
End: 2024-07-09
Payer: MEDICARE

## 2024-07-09 DIAGNOSIS — A09 INFECTIOUS DIARRHEA: Primary | ICD-10-CM

## 2024-07-09 NOTE — TELEPHONE ENCOUNTER
Patient walk in. Patient states she has been on antibiotics for multiple urinary tract infections. She states she has had diarrhea and would like for her stool to be tested. Patient has an appointment with Jonathan on 8/21.     Per verbal order of Dr. Hidalgo: stool study for c.diff order placed.     Stool kit given to patient along with instructions.

## 2024-08-21 ENCOUNTER — OFFICE VISIT (OUTPATIENT)
Dept: GASTROENTEROLOGY | Facility: CLINIC | Age: 84
End: 2024-08-21
Payer: MEDICARE

## 2024-08-21 VITALS
HEIGHT: 64 IN | SYSTOLIC BLOOD PRESSURE: 158 MMHG | BODY MASS INDEX: 21 KG/M2 | TEMPERATURE: 97 F | HEART RATE: 62 BPM | DIASTOLIC BLOOD PRESSURE: 72 MMHG | WEIGHT: 123 LBS

## 2024-08-21 DIAGNOSIS — R19.7 DIARRHEA, UNSPECIFIED TYPE: Primary | ICD-10-CM

## 2024-08-21 LAB
ALBUMIN SERPL-MCNC: 4.6 G/DL (ref 3.5–5.2)
ALBUMIN/GLOB SERPL: 1.8 G/DL
ALP SERPL-CCNC: 123 U/L (ref 39–117)
ALT SERPL-CCNC: 19 U/L (ref 1–33)
AST SERPL-CCNC: 21 U/L (ref 1–32)
BILIRUB SERPL-MCNC: 0.5 MG/DL (ref 0–1.2)
BUN SERPL-MCNC: 18 MG/DL (ref 8–23)
BUN/CREAT SERPL: 24 (ref 7–25)
CALCIUM SERPL-MCNC: 9.8 MG/DL (ref 8.6–10.5)
CHLORIDE SERPL-SCNC: 101 MMOL/L (ref 98–107)
CO2 SERPL-SCNC: 29.9 MMOL/L (ref 22–29)
CREAT SERPL-MCNC: 0.75 MG/DL (ref 0.57–1)
EGFRCR SERPLBLD CKD-EPI 2021: 77.6 ML/MIN/1.73
ERYTHROCYTE [DISTWIDTH] IN BLOOD BY AUTOMATED COUNT: 11.7 % (ref 12.3–15.4)
GLOBULIN SER CALC-MCNC: 2.5 GM/DL
GLUCOSE SERPL-MCNC: 97 MG/DL (ref 65–99)
HCT VFR BLD AUTO: 39.1 % (ref 34–46.6)
HGB BLD-MCNC: 13.2 G/DL (ref 12–15.9)
MCH RBC QN AUTO: 30.5 PG (ref 26.6–33)
MCHC RBC AUTO-ENTMCNC: 33.8 G/DL (ref 31.5–35.7)
MCV RBC AUTO: 90.3 FL (ref 79–97)
PLATELET # BLD AUTO: 245 10*3/MM3 (ref 140–450)
POTASSIUM SERPL-SCNC: 4.5 MMOL/L (ref 3.5–5.2)
PROT SERPL-MCNC: 7.1 G/DL (ref 6–8.5)
RBC # BLD AUTO: 4.33 10*6/MM3 (ref 3.77–5.28)
SODIUM SERPL-SCNC: 139 MMOL/L (ref 136–145)
TSH SERPL DL<=0.005 MIU/L-ACNC: 4.6 UIU/ML (ref 0.27–4.2)
WBC # BLD AUTO: 4.87 10*3/MM3 (ref 3.4–10.8)

## 2024-08-21 PROCEDURE — 1159F MED LIST DOCD IN RCRD: CPT | Performed by: NURSE PRACTITIONER

## 2024-08-21 PROCEDURE — 99214 OFFICE O/P EST MOD 30 MIN: CPT | Performed by: NURSE PRACTITIONER

## 2024-08-21 PROCEDURE — 1160F RVW MEDS BY RX/DR IN RCRD: CPT | Performed by: NURSE PRACTITIONER

## 2024-08-21 NOTE — PROGRESS NOTES
"Chief Complaint   Patient presents with    Diarrhea       HPI    Jael Navarro is a  84 y.o. female here for a follow up visit for diarrhea.    This patient follows with Dr. Hidalgo, new to me.    Past medical history of anxiety disorder, thyroid disease, fibromyalgia, GERD, hyperlipidemia, hypertension, irritable bowel syndrome along with vitamin D deficiency.    On visit today she reports waxing and waning bowel pattern having more diarrhea over the last several months.  Diarrhea comes and goes throughout the week in between she will pass formed mushy consistency stools.  She finds fried foods are a trigger.  Reports abdominal \"butterflies\" without abdominal pain, rectal bleeding or rectal pain.  She recently started taking [employer]' probiotics again.  Denies sick contacts or recent antibiotic use.    C-scope 2017 (reviewed) - Diverticulosis.  Nonbleeding internal hemorrhoids.  Congested mucosa in the rectum.  Recall 10 years.  Pathology was benign.    Past Medical History:   Diagnosis Date    Acute cystitis without hematuria     Allergic rhinitis     Allergic rhinitis due to animal (cat) (dog) hair and dander     Allergic rhinitis, unspecified     Anxiety disorder, unspecified     Atherosclerosis of native arteries of extremities with intermittent claudication, bilateral legs     Atherosclerosis of native arteries of extremities with intermittent claudication, bilateral legs 02/12/2019    PVD    Cystocele, unspecified     Degeneration, intervertebral disc, thoracic     Disease of thyroid gland     Disorder of thyroid, unspecified     Essential (primary) hypertension     Fibromyalgia     Fracture of ankle     Gastro-esophageal reflux disease without esophagitis     Gingival and periodontal disease     Hyperlipidemia, unspecified     Hypertension     Hypothyroidism, unspecified     Impaired glucose tolerance (oral)     Irritable bowel syndrome     Knee fracture     Left lower quadrant pain     Other cervical " disc degeneration, unspecified cervical region     Rosacea, unspecified     Unspecified cataract     Unspecified fracture of left wrist and hand, initial encounter for closed fracture     Unspecified fracture of right patella, initial encounter for closed fracture     Unspecified macular degeneration     Unspecified urinary incontinence     Vitamin D deficiency, unspecified     Wrist fracture        Past Surgical History:   Procedure Laterality Date    ABDOMINOPLASTY      APPENDECTOMY      CATARACT EXTRACTION Bilateral     COLONOSCOPY  unknown    COLONOSCOPY N/A 2017    Procedure: COLONOSCOPY TO CECUM WITH COLD BIOPSIES;  Surgeon: Kyle Hidalgo MD;  Location: [facility] ENDOSCOPY;  Service:     DILATATION AND CURETTAGE      EXCISION BENIGN SKIN LESION SCALP / NECK / HANDS / FEET / GENITALIA      scalp-benign    HAND SURGERY      mass between thumb and forefinger    HYSTERECTOMY         Scheduled Meds:     Continuous Infusions: No current facility-administered medications for this visit.      PRN Meds:     Allergies   Allergen Reactions    Augmentin [Amoxicillin-Pot Clavulanate] Nausea And Vomiting    Iodinated Contrast Media Anaphylaxis    Other Anaphylaxis     CONTRAST DYE  CONTRAST DYE  CONTRAST DYE    Contrast Dye (Echo Or Unknown Ct/Mr) Unknown - High Severity    Iodine     Amoxicillin Irritability     Projectile vomiting    Sulfa Antibiotics Rash       Social History     Socioeconomic History    Marital status:    Tobacco Use    Smoking status: Never    Smokeless tobacco: Never    Tobacco comments:     Patient does not smoke   Vaping Use    Vaping status: Never Used   Substance and Sexual Activity    Alcohol use: No     Comment: Patient is non drinker    Drug use: No     Comment: Drug Abuse: none    Sexual activity: Defer       Family History   Problem Relation Age of Onset    Heart attack Mother     Alzheimer's disease Mother     Glaucoma Mother     Heart disease Mother 96             Hypertension Mother     Thyroid disease Mother     Heart disease Father     Stroke Father 40            Alzheimer's disease Sister     Heart attack Maternal Grandmother     Anxiety disorder Daughter     Bipolar disorder Daughter     Depression Daughter     Stroke Daughter     Colon polyps Daughter     Diabetes Cousin     Emphysema Cousin     Heart disease Cousin     Multiple sclerosis Cousin     Breast cancer Other     Lung cancer Other     Ovarian cancer Other     Diabetes Other     Heart disease Other     Heart disease Other     Nephrolithiasis Other        Review of Systems   Gastrointestinal:  Positive for diarrhea.       Vitals:    24 1019   BP: 158/72   Pulse: 62   Temp:        Physical Exam  Constitutional:       Appearance: She is well-developed.   Abdominal:      General: Bowel sounds are normal. There is no distension.      Palpations: Abdomen is soft. There is no mass.      Tenderness: There is no abdominal tenderness. There is no guarding.      Hernia: No hernia is present.   Skin:     General: Skin is warm and dry.      Capillary Refill: Capillary refill takes less than 2 seconds.   Neurological:      Mental Status: She is alert and oriented to person, place, and time.   Psychiatric:         Behavior: Behavior normal.     Assessment    Diagnoses and all orders for this visit:    1. Diarrhea, unspecified type (Primary)  -     Clostridioides difficile EIA - Stool, Per Rectum  -     Stool Culture (Reference Lab) - Stool, Per Rectum  -     CBC (No Diff)  -     Comprehensive Metabolic Panel  -     TSH    Plan    Recommend stool testing as above for further evaluation of diarrhea symptoms  Start Metamucil 1 tablespoon daily in the interim  Encouraged adequate hydration  CBC, CMP and TSH today  Further recommendations to follow pending workup         KI Kearney  [facility]  [address] [place] [address]  Office: [phone]    I spent 30  minutes caring for Lulú on this date of service. This time includes time spent by me in the following activities: preparing for the visit, reviewing tests, obtaining and/or reviewing a separately obtained history, performing a medically appropriate examination and/or evaluation , counseling and educating the patient/family/caregiver, ordering medications, tests, or procedures, documenting information in the medical record, independently interpreting results and communicating that information with the patient/family/caregiver and care coordination.

## 2024-08-23 LAB — C DIFF TOX A+B STL QL IA: NEGATIVE

## 2024-08-23 NOTE — PROGRESS NOTES
Please inform the patient her thyroid hormone is slightly above normal.  No evidence of anemia.  Please complete stool testing.

## 2024-08-26 ENCOUNTER — TELEPHONE (OUTPATIENT)
Dept: GASTROENTEROLOGY | Facility: CLINIC | Age: 84
End: 2024-08-26
Payer: MEDICARE

## 2024-08-26 LAB
BACTERIA SPEC CULT: NORMAL
BACTERIA SPEC CULT: NORMAL
CAMPYLOBACTER STL CULT: NORMAL
E COLI SXT STL QL IA: NEGATIVE
SALM + SHIG STL CULT: NORMAL

## 2024-08-26 NOTE — TELEPHONE ENCOUNTER
----- Message from Marilu Fay sent at 8/23/2024 12:22 PM EDT -----  Please inform the patient her thyroid hormone is slightly above normal.  No evidence of anemia.  Please complete stool testing.

## 2024-09-11 ENCOUNTER — TELEPHONE (OUTPATIENT)
Dept: GASTROENTEROLOGY | Facility: CLINIC | Age: 84
End: 2024-09-11
Payer: MEDICARE

## 2024-09-11 NOTE — TELEPHONE ENCOUNTER
"Hub staff attempted to follow warm transfer process and was unsuccessful     Caller: Jael Navarro \"Lulú\"    Relationship to patient: Self    Best call back number: [phone]    Patient is needing: PT CALLED STATING THAT SHE WOULD LIKE TO GO OVER RESULTS// REFER TO 8/26/2024 TE// PLEASE CALL PT BACK  "

## 2024-11-27 ENCOUNTER — OFFICE VISIT (OUTPATIENT)
Dept: GASTROENTEROLOGY | Facility: CLINIC | Age: 84
End: 2024-11-27
Payer: MEDICARE

## 2024-11-27 VITALS
BODY MASS INDEX: 21.68 KG/M2 | HEART RATE: 59 BPM | HEIGHT: 64 IN | WEIGHT: 127 LBS | TEMPERATURE: 97.2 F | DIASTOLIC BLOOD PRESSURE: 78 MMHG | SYSTOLIC BLOOD PRESSURE: 174 MMHG

## 2024-11-27 DIAGNOSIS — K58.0 IRRITABLE BOWEL SYNDROME WITH DIARRHEA: Primary | ICD-10-CM

## 2024-11-27 PROCEDURE — 99214 OFFICE O/P EST MOD 30 MIN: CPT | Performed by: NURSE PRACTITIONER

## 2024-11-27 PROCEDURE — 1160F RVW MEDS BY RX/DR IN RCRD: CPT | Performed by: NURSE PRACTITIONER

## 2024-11-27 PROCEDURE — 1159F MED LIST DOCD IN RCRD: CPT | Performed by: NURSE PRACTITIONER

## 2024-11-27 NOTE — PROGRESS NOTES
Chief Complaint   Patient presents with    Irritable Bowel Syndrome       HPI    Jael Navarro is a  84 y.o. female here for a follow up visit for IBS.     This is a former patient of Dr. Hidalgo's that is known to me.    Past medical history of anxiety disorder, thyroid disease, fibromyalgia, GERD, hyperlipidemia, hypertension, irritable bowel syndrome along with vitamin D deficiency.     Patient completed stool testing in August which included C. difficile and stool culture both of which were negative.  Lab work at the time with mildly elevated TSH at 4.6 was unremarkable.    On visit today she reports improvement in symptoms with significant improvement in psychosocial stressors.  She is still having several bowel movements throughout the day with looser to mushy consistency stool.  No rectal bleeding, rectal pain or weight loss.  We talked about the benefits of fiber therapy on last office visit but she only tried a couple of doses.    C-scope 2017 (reviewed) - Diverticulosis.  Nonbleeding internal hemorrhoids.  Congested mucosa in the rectum.  Recall 10 years.  Pathology was benign.     Past Medical History:   Diagnosis Date    Acute cystitis without hematuria     Allergic rhinitis     Allergic rhinitis due to animal (cat) (dog) hair and dander     Allergic rhinitis, unspecified     Anxiety disorder, unspecified     Atherosclerosis of native arteries of extremities with intermittent claudication, bilateral legs     Atherosclerosis of native arteries of extremities with intermittent claudication, bilateral legs 02/12/2019    PVD    Cystocele, unspecified     Degeneration, intervertebral disc, thoracic     Disease of thyroid gland     Disorder of thyroid, unspecified     Essential (primary) hypertension     Fibromyalgia     Fracture of ankle     Gastro-esophageal reflux disease without esophagitis     Gingival and periodontal disease     Hyperlipidemia, unspecified     Hypertension     Hypothyroidism,  unspecified     Impaired glucose tolerance (oral)     Irritable bowel syndrome     Knee fracture     Left lower quadrant pain     Other cervical disc degeneration, unspecified cervical region     Rosacea, unspecified     Unspecified cataract     Unspecified fracture of left wrist and hand, initial encounter for closed fracture     Unspecified fracture of right patella, initial encounter for closed fracture     Unspecified macular degeneration     Unspecified urinary incontinence     Vitamin D deficiency, unspecified     Wrist fracture        Past Surgical History:   Procedure Laterality Date    ABDOMINOPLASTY      APPENDECTOMY      CATARACT EXTRACTION Bilateral     COLONOSCOPY  unknown    COLONOSCOPY N/A 03/29/2017    Procedure: COLONOSCOPY TO CECUM WITH COLD BIOPSIES;  Surgeon: Kyle Hidalgo MD;  Location: [facility] ENDOSCOPY;  Service:     DILATATION AND CURETTAGE      EXCISION BENIGN SKIN LESION SCALP / NECK / HANDS / FEET / GENITALIA      scalp-benign    HAND SURGERY      mass between thumb and forefinger    HYSTERECTOMY         Scheduled Meds:     Continuous Infusions: No current facility-administered medications for this visit.      PRN Meds:     Allergies   Allergen Reactions    Augmentin [Amoxicillin-Pot Clavulanate] Nausea And Vomiting    Iodinated Contrast Media Anaphylaxis    Other Anaphylaxis     CONTRAST DYE  CONTRAST DYE  CONTRAST DYE    Contrast Dye (Echo Or Unknown Ct/Mr) Unknown - High Severity    Iodine     Amoxicillin Irritability     Projectile vomiting    Sulfa Antibiotics Rash       Social History     Socioeconomic History    Marital status:    Tobacco Use    Smoking status: Never    Smokeless tobacco: Never    Tobacco comments:     Patient does not smoke   Vaping Use    Vaping status: Never Used   Substance and Sexual Activity    Alcohol use: No     Comment: Patient is non drinker    Drug use: No     Comment: Drug Abuse: none    Sexual activity: Defer       Family History   Problem  Relation Age of Onset    Heart attack Mother     Alzheimer's disease Mother     Glaucoma Mother     Heart disease Mother 96            Hypertension Mother     Thyroid disease Mother     Heart disease Father     Stroke Father 40            Alzheimer's disease Sister     Heart attack Maternal Grandmother     Anxiety disorder Daughter     Bipolar disorder Daughter     Depression Daughter     Stroke Daughter     Colon polyps Daughter     Diabetes Cousin     Emphysema Cousin     Heart disease Cousin     Multiple sclerosis Cousin     Breast cancer Other     Lung cancer Other     Ovarian cancer Other     Diabetes Other     Heart disease Other     Heart disease Other     Nephrolithiasis Other        Review of Systems   Gastrointestinal:         + loose stool        Vitals:    24 0912   BP: 174/78   Pulse: 59   Temp: 97.2 °F (36.2 °C)       Physical Exam  Constitutional:       Appearance: She is well-developed.   Abdominal:      General: Bowel sounds are normal. There is no distension.      Palpations: Abdomen is soft. There is no mass.      Tenderness: There is no abdominal tenderness. There is no guarding.      Hernia: No hernia is present.   Skin:     General: Skin is warm and dry.      Capillary Refill: Capillary refill takes less than 2 seconds.   Neurological:      Mental Status: She is alert and oriented to person, place, and time.   Psychiatric:         Behavior: Behavior normal.     Assessment    Diagnoses and all orders for this visit:    1. Irritable bowel syndrome with diarrhea (Primary)    Plan    Metamucil 1 tablespoon daily in combination with Rush' colon health probiotics  Encouraged her to try this daily for the next 2 weeks and monitor for symptom improvement  Follow-up as needed         KI Kearney  [facility]  [address] [place] [address]  Office: [phone]    I spent 30 minutes caring for Lulú on this date of service.  This time includes time spent by me in the following activities: preparing for the visit, reviewing tests, obtaining and/or reviewing a separately obtained history, performing a medically appropriate examination and/or evaluation , counseling and educating the patient/family/caregiver, ordering medications, tests, or procedures, documenting information in the medical record, independently interpreting results and communicating that information with the patient/family/caregiver and care coordination.

## 2025-01-06 ENCOUNTER — TELEPHONE (OUTPATIENT)
Age: 85
End: 2025-01-06

## 2025-01-06 NOTE — TELEPHONE ENCOUNTER
"  Caller: Jael Navarro \"Lulú\"    Relationship to patient: Self    Best call back number: [phone]    Chief complaint: APPT WITH TESTING RESCHEDULED FOR TOMORROW 1/7/25    Type of visit: FOLLOW UPS    Requested date: NA     If rescheduling, when is the original appointment: 1/7/25     Additional notes:PLEASE CALL BACK TO RESCHEDULE THANKS!       "

## 2025-01-30 ENCOUNTER — TELEPHONE (OUTPATIENT)
Age: 85
End: 2025-01-30
Payer: MEDICARE

## 2025-01-30 NOTE — TELEPHONE ENCOUNTER
Spoke to  regarding pt reports of tremor and pt hx, pt has appt in April for scan,  feels the tremor is unrelated to any vascular issues, pt ok to keep appt for April, LVM for pt regarding all of the above.

## 2025-01-30 NOTE — TELEPHONE ENCOUNTER
"  Caller: Jael Navarro \"Lulú\"    Relationship: Self    Best call back number: [phone]    What is the best time to reach you: ANY    Who are you requesting to speak with (clinical staff, provider,  specific staff member): CLINICAL    Do you know the name of the person who called: NA     What was the call regarding:    DEVELOPED A TREMER IN LEFT HAND- UNSURE OF WHAT ITS COMING FROM- NO PAIN - JUST INTERMITTENT     BLOCKAGE IN MAIN ARTERY LEFT SIDE BRAIN    PT IS CONCERNED ABOUT APPT BEING IN APRIL- ADDED APPT TO WAITLIST PLEASE CALL HER BACK TO DISCUSS.   "

## 2025-03-18 ENCOUNTER — TELEPHONE (OUTPATIENT)
Dept: GASTROENTEROLOGY | Facility: CLINIC | Age: 85
End: 2025-03-18
Payer: MEDICARE

## 2025-03-18 NOTE — TELEPHONE ENCOUNTER
"  Caller: Jael Navarro \"Lulú\"    Relationship: Self    Best call back number: [phone]    What is the best time to reach you: ANYTIME    Who are you requesting to speak with (clinical staff, provider,  specific staff member): CLINICAL STAFF/ POLO        What was the call regarding: PT IS CALLING FOR MEDICAL ADVICE. PLEASE CALL AN ADVISE.      "

## 2025-03-18 NOTE — TELEPHONE ENCOUNTER
Returned patient's phone call. She states she is having issues and would like to talk with Marilu. She did not mention what the issues were when asked. She wants to talk with Marilu only.    Update to Marilu.

## 2025-03-19 NOTE — TELEPHONE ENCOUNTER
Let her know I am in clinic all day and see if she would be willing to talk about her concerns with you and you can send me an update.

## 2025-03-25 NOTE — PROGRESS NOTES
Chief Complaint  Peripheral vascular disease of extremity with claudication  Follow-up on peripheral artery disease with claudication    Subjective        Jael Elana Navarro presents to [facility] VASCULAR SURGERY  History of Present Illness  The patient is an 84-year-old female that I have known for several decades, retired operating room nurse, with carotid artery occlusive disease and peripheral artery disease.  She has significant lower extremity occlusive disease but no critical limb ischemic symptoms.  ABIs on the right have been between 0.58 and 0.5, and on the left from 0.38 and 0.4.  She has had duplex imaging performed of the aorta and runoff vessels, because of fear of nephrotoxicity with contrast, and this demonstrated moderate distal aortic stenosis, iliac arteries without significant stenosis, and severe stenoses and occlusions in both superficial femoral arteries.  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  She was placed on Toprol for cardiac dysrhythmias the last part of 2023.  Her problem with falling improved with this.  She returned on April 2, 2024 with repeat studies.  Carotid duplex scan demonstrates mild occlusive disease bilaterally.  Left vertebral artery is patent (the intracranial vertebral artery has the occlusion)  ABIs were 0.56 on the right and 0.38 on the left, unchanged.  She returned on June 25, 2024 with ABIs.  Improved bilaterally, 0.66 on the right and 0.47 on the left.  Carotid duplex scan demonstrated less than 50% stenosis bilaterally.  Vertebral arteries patent bilaterally (left vertebral artery occluded intracranially) ABIs were 0.49 on the right 0.43 on the left, without significant change.    Past History:  Medical History: has a past medical history of Acute cystitis without hematuria, Allergic rhinitis, Allergic rhinitis due to animal (cat) (dog)  hair and dander, Allergic rhinitis, unspecified, Anxiety disorder, unspecified, Atherosclerosis of native arteries of extremities with intermittent claudication, bilateral legs, Atherosclerosis of native arteries of extremities with intermittent claudication, bilateral legs (02/12/2019), Cystocele, unspecified, Degeneration, intervertebral disc, thoracic, Disease of thyroid gland, Disorder of thyroid, unspecified, Essential (primary) hypertension, Fibromyalgia, Fracture of ankle, Gastro-esophageal reflux disease without esophagitis, Gingival and periodontal disease, Hyperlipidemia, unspecified, Hypertension, Hypothyroidism, unspecified, Impaired glucose tolerance (oral), Irritable bowel syndrome, Knee fracture, Left lower quadrant pain, Other cervical disc degeneration, unspecified cervical region, Rosacea, unspecified, Unspecified cataract, Unspecified fracture of left wrist and hand, initial encounter for closed fracture, Unspecified fracture of right patella, initial encounter for closed fracture, Unspecified macular degeneration, Unspecified urinary incontinence, Vitamin D deficiency, unspecified, and Wrist fracture.   Surgical History: has a past surgical history that includes Abdominoplasty; Cataract Extraction (Bilateral); Dilation and curettage of uterus; Excision benign skin lesion scalp / neck / hands / feet / genitalia; Hand surgery; Hysterectomy; Colonoscopy (unknown); Appendectomy; and Colonoscopy (N/A, 03/29/2017).   Family History: family history includes Alzheimer's disease in her mother and sister; Anxiety disorder in her daughter; Bipolar disorder in her daughter; Breast cancer in an other family member; Colon polyps in her daughter; Depression in her daughter; Diabetes in her cousin and another family member; Emphysema in her cousin; Glaucoma in her mother; Heart attack in her maternal grandmother and mother; Heart disease in her cousin, father, and other family members; Heart disease (age of  "onset: 96) in her mother; Hypertension in her mother; Lung cancer in an other family member; Multiple sclerosis in her cousin; Nephrolithiasis in an other family member; Ovarian cancer in an other family member; Stroke in her daughter; Stroke (age of onset: 40) in her father; Thyroid disease in her mother.   Social History: reports that she has never smoked. She has never been exposed to tobacco smoke. She has never used smokeless tobacco. She reports that she does not drink alcohol and does not use drugs.    (Not in a hospital admission)     Allergies: Augmentin [amoxicillin-pot clavulanate], Iodinated contrast media, Other, Contrast dye (echo or unknown ct/mr), Iodine, Amoxicillin, and Sulfa antibiotics   Objective   Vital Signs:  BP (!) 182/88   Ht 162.6 cm (64\")   Wt 57.6 kg (127 lb)   BMI 21.80 kg/m²   Estimated body mass index is 21.8 kg/m² as calculated from the following:    Height as of this encounter: 162.6 cm (64\").    Weight as of this encounter: 57.6 kg (127 lb).     BMI is within normal parameters. No other follow-up for BMI required.    Jael Navarro  reports that she has never smoked. She has never been exposed to tobacco smoke. She has never used smokeless tobacco.            Physical Exam   Result Review :        Data reviewed : ABIs from April 2, 2025, findings as above             Assessment and Plan     Diagnoses and all orders for this visit:    1. Peripheral vascular disease of extremity with claudication (Primary)  -     Doppler Ankle Brachial Index Single Level CAR; Future    2. Vertebral artery occlusion, left    3. Bilateral carotid artery stenosis    She continues to do satisfactorily, holding her own in regards to her peripheral artery disease, which is significant but not critical limb threatening ischemia.  She only has claudication symptoms but is walking increasing distances.  No progression of carotid stenosis.  Her vertebral artery occlusion is intracranial and nothing to " be done for this.  She will continue the same medication regimen.  I will see her in follow-up in 6 months with ABIs.  She will not need a carotid duplex scan for at least a year.         Follow Up     Return in about 6 months (around 10/2/2025) for With ABIs.  Patient was given instructions and counseling regarding her condition or for health maintenance advice. Please see specific information pulled into the AVS if appropriate.

## 2025-04-02 ENCOUNTER — HOSPITAL ENCOUNTER (OUTPATIENT)
Facility: HOSPITAL | Age: 85
Discharge: HOME | End: 2025-04-02
Payer: MEDICARE

## 2025-04-02 ENCOUNTER — OFFICE VISIT (OUTPATIENT)
Age: 85
End: 2025-04-02
Payer: MEDICARE

## 2025-04-02 VITALS
HEIGHT: 64 IN | DIASTOLIC BLOOD PRESSURE: 88 MMHG | BODY MASS INDEX: 21.68 KG/M2 | WEIGHT: 127 LBS | SYSTOLIC BLOOD PRESSURE: 182 MMHG

## 2025-04-02 DIAGNOSIS — I65.23 BILATERAL CAROTID ARTERY STENOSIS: ICD-10-CM

## 2025-04-02 DIAGNOSIS — I65.02 VERTEBRAL ARTERY OCCLUSION, LEFT: ICD-10-CM

## 2025-04-02 DIAGNOSIS — I73.9 PERIPHERAL VASCULAR DISEASE OF EXTREMITY WITH CLAUDICATION: Primary | ICD-10-CM

## 2025-04-02 DIAGNOSIS — I73.9 PERIPHERAL VASCULAR DISEASE OF EXTREMITY WITH CLAUDICATION: ICD-10-CM

## 2025-04-02 LAB
BH CV LOWER ARTERIAL LEFT ABI RATIO: 0.43
BH CV LOWER ARTERIAL LEFT DORSALIS PEDIS SYS MAX: 68
BH CV LOWER ARTERIAL LEFT POST TIBIAL SYS MAX: 78
BH CV LOWER ARTERIAL RIGHT ABI RATIO: 0.49
BH CV LOWER ARTERIAL RIGHT DORSALIS PEDIS SYS MAX: 90
BH CV LOWER ARTERIAL RIGHT POST TIBIAL SYS MAX: 88
BH CV XLRA MEAS LEFT CAROTID BULB EDV: 18 CM/SEC
BH CV XLRA MEAS LEFT CAROTID BULB PSV: 108.1 CM/SEC
BH CV XLRA MEAS LEFT DIST CCA EDV: 13.3 CM/SEC
BH CV XLRA MEAS LEFT DIST CCA PSV: 123.3 CM/SEC
BH CV XLRA MEAS LEFT DIST ICA EDV: -20.2 CM/SEC
BH CV XLRA MEAS LEFT DIST ICA PSV: -120.9 CM/SEC
BH CV XLRA MEAS LEFT ICA/CCA RATIO: 1.18
BH CV XLRA MEAS LEFT MID CCA EDV: 15.2 CM/SEC
BH CV XLRA MEAS LEFT MID CCA PSV: 142.2 CM/SEC
BH CV XLRA MEAS LEFT MID ICA EDV: -23.7 CM/SEC
BH CV XLRA MEAS LEFT MID ICA PSV: -145.8 CM/SEC
BH CV XLRA MEAS LEFT PROX CCA EDV: 13.3 CM/SEC
BH CV XLRA MEAS LEFT PROX CCA PSV: 144.1 CM/SEC
BH CV XLRA MEAS LEFT PROX ECA PSV: -230.7 CM/SEC
BH CV XLRA MEAS LEFT PROX ICA EDV: -23.7 CM/SEC
BH CV XLRA MEAS LEFT PROX ICA PSV: -133.9 CM/SEC
BH CV XLRA MEAS LEFT PROX SCLA PSV: 192.8 CM/SEC
BH CV XLRA MEAS LEFT VERTEBRAL A PSV: 57.8 CM/SEC
BH CV XLRA MEAS RIGHT CAROTID BULB EDV: -9.9 CM/SEC
BH CV XLRA MEAS RIGHT CAROTID BULB PSV: -65.1 CM/SEC
BH CV XLRA MEAS RIGHT DIST CCA EDV: 12 CM/SEC
BH CV XLRA MEAS RIGHT DIST CCA PSV: 113.2 CM/SEC
BH CV XLRA MEAS RIGHT DIST ICA EDV: -19.9 CM/SEC
BH CV XLRA MEAS RIGHT DIST ICA PSV: -109.1 CM/SEC
BH CV XLRA MEAS RIGHT ICA/CCA RATIO: 1.15
BH CV XLRA MEAS RIGHT MID CCA EDV: 14.2 CM/SEC
BH CV XLRA MEAS RIGHT MID CCA PSV: 126.1 CM/SEC
BH CV XLRA MEAS RIGHT MID ICA EDV: -21.8 CM/SEC
BH CV XLRA MEAS RIGHT MID ICA PSV: -130.9 CM/SEC
BH CV XLRA MEAS RIGHT PROX CCA EDV: 19 CM/SEC
BH CV XLRA MEAS RIGHT PROX CCA PSV: 150.1 CM/SEC
BH CV XLRA MEAS RIGHT PROX ECA PSV: -121 CM/SEC
BH CV XLRA MEAS RIGHT PROX ICA EDV: -16.1 CM/SEC
BH CV XLRA MEAS RIGHT PROX ICA PSV: -118.5 CM/SEC
BH CV XLRA MEAS RIGHT PROX SCLA PSV: 302.6 CM/SEC
BH CV XLRA MEAS RIGHT VERTEBRAL A EDV: 13.4 CM/SEC
BH CV XLRA MEAS RIGHT VERTEBRAL A PSV: 72.2 CM/SEC
LEFT ARM BP: 180 MMHG
RIGHT ARM BP: 182 MMHG
UPPER ARTERIAL LEFT ARM BRACHIAL SYS MAX: 180
UPPER ARTERIAL RIGHT ARM BRACHIAL SYS MAX: 182

## 2025-04-02 PROCEDURE — 93880 EXTRACRANIAL BILAT STUDY: CPT

## 2025-04-02 PROCEDURE — 93922 UPR/L XTREMITY ART 2 LEVELS: CPT

## 2025-06-27 DIAGNOSIS — I73.9 PERIPHERAL ARTERIAL DISEASE: ICD-10-CM

## 2025-06-27 RX ORDER — CILOSTAZOL 50 MG/1
50 TABLET ORAL 2 TIMES DAILY
Qty: 60 TABLET | Refills: 0 | Status: SHIPPED | OUTPATIENT
Start: 2025-06-27 | End: 2025-06-30 | Stop reason: SDUPTHER

## 2025-06-30 ENCOUNTER — TELEPHONE (OUTPATIENT)
Age: 85
End: 2025-06-30
Payer: MEDICARE

## 2025-06-30 DIAGNOSIS — I73.9 PERIPHERAL ARTERIAL DISEASE: ICD-10-CM

## 2025-06-30 RX ORDER — CILOSTAZOL 50 MG/1
50 TABLET ORAL 2 TIMES DAILY
Qty: 60 TABLET | Refills: 0 | Status: SHIPPED | OUTPATIENT
Start: 2025-06-30

## 2025-06-30 NOTE — TELEPHONE ENCOUNTER
Patient called requesting a refill for cilostazol.     Pharmacy- [facility] [address]    Call back is [phone]

## 2025-08-07 ENCOUNTER — TELEPHONE (OUTPATIENT)
Age: 85
End: 2025-08-07
Payer: MEDICARE

## 2025-08-12 ENCOUNTER — TELEPHONE (OUTPATIENT)
Age: 85
End: 2025-08-12
Payer: MEDICARE

## (undated) DEVICE — SINGLE-USE BIOPSY FORCEPS: Brand: RADIAL JAW 4

## (undated) DEVICE — Device: Brand: DEFENDO AIR/WATER/SUCTION AND BIOPSY VALVE

## (undated) DEVICE — CANN NASL CO2 TRULINK W/O2 A/

## (undated) DEVICE — TUBING, SUCTION, 1/4" X 10', STRAIGHT: Brand: MEDLINE

## (undated) DEVICE — THE TORRENT IRRIGATION SCOPE CONNECTOR IS USED WITH THE TORRENT IRRIGATION TUBING TO PROVIDE IRRIGATION FLUIDS SUCH AS STERILE WATER DURING GASTROINTESTINAL ENDOSCOPIC PROCEDURES WHEN USED IN CONJUNCTION WITH AN IRRIGATION PUMP (OR ELECTROSURGICAL UNIT).: Brand: TORRENT